# Patient Record
Sex: FEMALE | Race: WHITE | NOT HISPANIC OR LATINO | Employment: UNEMPLOYED | URBAN - NONMETROPOLITAN AREA
[De-identification: names, ages, dates, MRNs, and addresses within clinical notes are randomized per-mention and may not be internally consistent; named-entity substitution may affect disease eponyms.]

---

## 2017-01-17 ENCOUNTER — OFFICE VISIT (OUTPATIENT)
Dept: FAMILY MEDICINE CLINIC | Facility: CLINIC | Age: 22
End: 2017-01-17

## 2017-01-17 VITALS
DIASTOLIC BLOOD PRESSURE: 80 MMHG | BODY MASS INDEX: 30.13 KG/M2 | RESPIRATION RATE: 16 BRPM | OXYGEN SATURATION: 98 % | TEMPERATURE: 98.1 F | HEIGHT: 67 IN | WEIGHT: 192 LBS | SYSTOLIC BLOOD PRESSURE: 122 MMHG | HEART RATE: 82 BPM

## 2017-01-17 DIAGNOSIS — E66.09 NON MORBID OBESITY DUE TO EXCESS CALORIES: Primary | ICD-10-CM

## 2017-01-17 PROCEDURE — 99213 OFFICE O/P EST LOW 20 MIN: CPT | Performed by: FAMILY MEDICINE

## 2017-01-17 RX ORDER — NORGESTIMATE AND ETHINYL ESTRADIOL 7DAYSX3 28
1 KIT ORAL DAILY
COMMUNITY
End: 2017-09-13

## 2017-01-17 NOTE — ASSESSMENT & PLAN NOTE
Obesity: Federal guidelines recommend that people under the age of 65 should have a BMI of 18.5-24.9 and people age 65 and older should have a BMI of 23-30. Morbid obesity is defined as >100 lb overweight or BMI >40.  The patient BMI is outside the recommended range and we recommended/discussed today to utilize a diet/exercise program to get back into the appropriate range.  Today we encouraged roughly a 1 lb per week weight loss with initial goal of 5% weight loss. The initial step is to document everything that is consumed into a food diary.  Studies have shown that patients can lose up to 2x the weight by keeping track of foods.  We discussed BEE today and discussed reasonable goal to realize weight loss.    - Discussed if eating out for a meal, consider cutting food in half and placing into to-go container.  Individually portion any foods coming into the home based on package.    - Consider using smaller plates.    - Consider drinking 12 oz of water 30 minutes before meal as way to suppress appetite.   - Cut back on soft drinks/juices.  Discussed 1 can of regular soft drink has roughly 150-170 Calories per day.    - Increase exercise as able. It is recommended to try to exercise minimum 10 minutes 5 days per week.  The goal over the next 2-4 weeks is to walk 30 minutes per day 5 days per week at pace difficult to hold conversation.   - Medications that may provide some benefit to weight loss include metformin, topamax, phentermine, Qsymia, Belviq (lorcaserin), Contrave (Buproprion/naltrexone) and a few others for Binge eating.  Also discussed some of the FAD diets and recommended general portion reduction instead of special dietary changes.  - Apps that may be of benefit include [employer] Pal, Lose it.

## 2017-01-17 NOTE — PROGRESS NOTES
Subjective   Vidhi Peña is a 21 y.o. female. Would like to talk about weight loss. Wants to try Contrave    History of Present Illness     {Common H&P Review Areas:03802}    Review of Systems    Objective   Physical Exam    Assessment/Plan   {Assess/PlanSmartLinks:41458}      [id number]

## 2017-01-17 NOTE — PROGRESS NOTES
No chief complaint on file.       History:  Vidhi Peña is a 21 y.o. female presents who today for evaluation of the above problems.  PCP currently listed as Bronson Segal MD.   BEE 1689.5 Calories/day.  1858.45 Santos/day is the nutritional goal. Binge eating is present per her report.  She feels she does this 3-4x per week.  She will eat and then feel guilty, feel she needs to work out and then doesn't.  Discussed phentermine today, discussed contrave as well. She is just at BMi of 30.  She has no HTN.  Goal weight ~160.  She has gained 8 lb in past few months.  This is hard for her as she is uncertain how to lose weight on her own.  Recommended food apps, calorie counting and working on controlling total intake of food.  No Gi issues, no  issues.  Overeats regularly.       Vidhi Peña  has no past medical history on file.    Allergies   Allergen Reactions   • Hydrocodone Nausea And Vomiting   • Dilaudid [Hydromorphone] Irritability   • Adhesive Tape Hives     No past medical history on file.  Past Surgical History   Procedure Laterality Date   • Shoulder surgery Right    • Dilatation and curettage     • [place] tooth extraction       Family History   Problem Relation Age of Onset   • Hypertension Maternal Grandfather    • Hyperlipidemia Maternal Grandfather    • Heart disease Maternal Grandfather    • Cancer Paternal Grandmother        Current Outpatient Prescriptions on File Prior to Visit   Medication Sig Dispense Refill   • amoxicillin (AMOXIL) 875 MG tablet Take 1 tablet by mouth 2 (Two) Times a Day. 20 tablet 0   • hydrOXYzine (ATARAX) 25 MG tablet Take 1 tablet by mouth 3 (Three) Times a Day As Needed for anxiety. 90 tablet 0   • sertraline (ZOLOFT) 25 MG tablet Take 1 pill nightly x 7 nights then increase to 2 pills nightly (Patient taking differently: 50 mg At Night As Needed. Take 1 pill nightly x 7 nights then increase to 2 pills nightly) 60 tablet 0   • [DISCONTINUED]  "norethindrone-ethinyl estradiol (LOESTRIN FE 1/20) 1-20 MG-MCG per tablet Take 1 tablet by mouth daily       No current facility-administered medications on file prior to visit.         ROS:  Review of Systems   Constitutional: Negative for activity change, appetite change, chills, fatigue and fever.   HENT: Negative for congestion, dental problem, ear discharge, ear pain, hearing loss, postnasal drip, rhinorrhea, sneezing, sore throat and tinnitus.    Eyes: Negative for photophobia, pain, discharge, redness, itching and visual disturbance.   Respiratory: Negative for apnea, chest tightness, shortness of breath, wheezing and stridor.    Cardiovascular: Negative for chest pain.   Gastrointestinal: Negative for abdominal pain, blood in stool, diarrhea, nausea and vomiting.   Endocrine: Negative for cold intolerance, heat intolerance, polydipsia, polyphagia and polyuria.   Genitourinary: Negative for difficulty urinating, dysuria, hematuria, urgency, vaginal bleeding and vaginal discharge.   Musculoskeletal: Negative for arthralgias, back pain, gait problem, myalgias and neck pain.   Skin: Negative for color change and rash.   Allergic/Immunologic: Negative for environmental allergies and food allergies.   Neurological: Negative for dizziness, seizures, facial asymmetry, numbness and headaches.   Hematological: Negative for adenopathy.   Psychiatric/Behavioral: Negative for agitation, behavioral problems, confusion, self-injury, sleep disturbance and suicidal ideas.       OBJECTIVE:  Visit Vitals   • /80   • Pulse 82   • Temp 98.1 °F (36.7 °C)   • Resp 16   • Ht 67\" (170.2 cm)   • Wt 192 lb (87.1 kg)   • LMP  (LMP Unknown)   • SpO2 98%   • BMI 30.07 kg/m2      Physical Exam   Constitutional: She appears well-developed and well-nourished. No distress.   HENT:   Head: Normocephalic and atraumatic.   Right Ear: External ear normal.   Left Ear: External ear normal.   Nose: Nose normal.   Mouth/Throat: Oropharynx is " clear and moist.   Eyes: Conjunctivae and EOM are normal. Pupils are equal, round, and reactive to light.   Neck: Normal range of motion. Neck supple. No thyromegaly present.   Cardiovascular: Normal rate, regular rhythm, normal heart sounds and intact distal pulses.    Pulmonary/Chest: Effort normal and breath sounds normal. No respiratory distress. She has no rales. She exhibits no tenderness.   Abdominal: Soft. Bowel sounds are normal. There is no tenderness. There is no rebound and no guarding.   Musculoskeletal: Normal range of motion. She exhibits no tenderness.   Lymphadenopathy:     She has no cervical adenopathy.   Neurological: She is alert. She has normal strength and normal reflexes. No sensory deficit. Coordination normal.   Skin: Skin is warm and dry. No rash noted. She is not diaphoretic.   Psychiatric: She has a normal mood and affect. Her behavior is normal. Judgment and thought content normal.   Nursing note and vitals reviewed.      Assessment/Plan:  Obesity: Federal guidelines recommend that people under the age of 65 should have a BMI of 18.5-24.9 and people age 65 and older should have a BMI of 23-30. Morbid obesity is defined as >100 lb overweight or BMI >40.  The patient BMI is outside the recommended range and we recommended/discussed today to utilize a diet/exercise program to get back into the appropriate range.  Today we encouraged roughly a 1 lb per week weight loss with initial goal of 5% weight loss. The initial step is to document everything that is consumed into a food diary.  Studies have shown that patients can lose up to 2x the weight by keeping track of foods.  We discussed BEE today and discussed reasonable goal to realize weight loss.  Weight loss counselling.  We spent a total of 15 minutes today spent in counselling/coordination of care. Reviewed sample medication R/B/A. Detailed ways to succeed.  Provided information about success methods. Handout provided.  Detailed  phentermine and contrave.  She chose contrave.   - Discussed if eating out for a meal, consider cutting food in half and placing into to-go container.  Individually portion any foods coming into the home based on package.    - Consider using smaller plates.    - Consider drinking 12 oz of water 30 minutes before meal as way to suppress appetite.   - Cut back on soft drinks/juices.  Discussed 1 can of regular soft drink has roughly 150-170 Calories per day.    - Increase exercise as able. It is recommended to try to exercise minimum 10 minutes 5 days per week.  The goal over the next 2-4 weeks is to walk 30 minutes per day 5 days per week at pace difficult to hold conversation.   - Medications that may provide some benefit to weight loss include metformin, topamax, phentermine, Qsymia, Belviq (lorcaserin), Contrave (Buproprion/naltrexone) and a few others for Binge eating.  Also discussed some of the FAD diets and recommended general portion reduction instead of special dietary changes.  - Apps that may be of benefit include [employer] Pal, Lose it.   - she chose contrave over phentermine. R/B/A to meds d/w patient, SE reviewed, handout offered regarding medications listed.    Discussed SE with her.     Orders Placed Today:  Diagnoses and all orders for this visit:    Non morbid obesity due to excess calories    Other orders  -     naltrexone-bupropion ER (CONTRAVE) 8-90 MG tablet; wk 1:  1 tablet daily  Wk 2: 1 tablet twice a day  Wk 3: 2 tablets in AM and 1 tablet in PM  Wk 4: 2 tablets twice a day      Risks/benefits of current and new medications discussed with the patient and or family today.  The patient/family are aware and accept that if there any side effects they should call or return to clinic as soon as possible.  Appropriate F/U discussed for topics addressed today. All questions were answered to the satisfactory state of patient/family.  Should symptoms fail to improve or worsen they agree to call or  return to clinic or to go to the ER. Education handouts were offered on any new Rx if requested.  Discussed the importance of following up with any needed screening tests/labs/specialist appointments and any requested follow-up recommended by me today.  Importance of maintaining follow-up discussed and patient accepts that missed appointments can delay diagnosis and potentially lead to worsening of conditions.    An After Visit Summary was printed and given to the patient at discharge.  Return in about 2 months (around 3/17/2017).         Bronson Segal M.D. 1/17/2017

## 2017-01-17 NOTE — PATIENT INSTRUCTIONS
Bupropion tablets (Depression/Mood Disorders)  What is this medicine?  BUPROPION (byoo PROE pee on) is used to treat depression.  This medicine may be used for other purposes; ask your health care provider or pharmacist if you have questions.  What should I tell my health care provider before I take this medicine?  They need to know if you have any of these conditions:  -an eating disorder, such as anorexia or bulimia  -bipolar disorder or psychosis  -diabetes or high blood sugar, treated with medication  -glaucoma  -heart disease, previous heart attack, or irregular heart beat  -head injury or brain tumor  -high blood pressure  -kidney or liver disease  -seizures  -suicidal thoughts or a previous suicide attempt  -Tourette's syndrome  -weight loss  -an unusual or allergic reaction to bupropion, other medicines, foods, dyes, or preservatives  -breast-feeding  -pregnant or trying to become pregnant  How should I use this medicine?  Take this medicine by mouth with a glass of water. Follow the directions on the prescription label. You can take it with or without food. If it upsets your stomach, take it with food. Take your medicine at regular intervals. Do not take your medicine more often than directed. Do not stop taking this medicine suddenly except upon the advice of your doctor. Stopping this medicine too quickly may cause serious side effects or your condition may worsen.  A special MedGuide will be given to you by the pharmacist with each prescription and refill. Be sure to read this information carefully each time.  Talk to your pediatrician regarding the use of this medicine in children. Special care may be needed.  Overdosage: If you think you have taken too much of this medicine contact a poison control center or emergency room at once.  NOTE: This medicine is only for you. Do not share this medicine with others.  What if I miss a dose?  If you miss a dose, take it as soon as you can. If it is less than  four hours to your next dose, take only that dose and skip the missed dose. Do not take double or extra doses.  What may interact with this medicine?  Do not take this medicine with any of the following medications:  -linezolid  -MAOIs like Azilect, Carbex, Eldepryl, Marplan, Nardil, and Parnate  -methylene blue (injected into a vein)  -other medicines that contain bupropion like Zyban  This medicine may also interact with the following medications:  -alcohol  -certain medicines for anxiety or sleep  -certain medicines for blood pressure like metoprolol, propranolol  -certain medicines for depression or psychotic disturbances  -certain medicines for HIV or AIDS like efavirenz, lopinavir, nelfinavir, ritonavir  -certain medicines for irregular heart beat like propafenone, flecainide  -certain medicines for Parkinson's disease like amantadine, levodopa  -certain medicines for seizures like carbamazepine, phenytoin, phenobarbital  -cimetidine  -clopidogrel  -cyclophosphamide  -furazolidone  -isoniazid  -nicotine  -orphenadrine  -procarbazine  -steroid medicines like prednisone or cortisone  -stimulant medicines for attention disorders, weight loss, or to stay awake  -tamoxifen  -theophylline  -thiotepa  -ticlopidine  -tramadol  -warfarin  This list may not describe all possible interactions. Give your health care provider a list of all the medicines, herbs, non-prescription drugs, or dietary supplements you use. Also tell them if you smoke, drink alcohol, or use illegal drugs. Some items may interact with your medicine.  What should I watch for while using this medicine?  Tell your doctor if your symptoms do not get better or if they get worse. Visit your doctor or health care professional for regular checks on your progress. Because it may take several weeks to see the full effects of this medicine, it is important to continue your treatment as prescribed by your doctor.  Patients and their families should watch out  for new or worsening thoughts of suicide or depression. Also watch out for sudden changes in feelings such as feeling anxious, agitated, panicky, irritable, hostile, aggressive, impulsive, severely restless, overly excited and hyperactive, or not being able to sleep. If this happens, especially at the beginning of treatment or after a change in dose, call your health care professional.  Avoid alcoholic drinks while taking this medicine. Drinking excessive alcoholic beverages, using sleeping or anxiety medicines, or quickly stopping the use of these agents while taking this medicine may increase your risk for a seizure.  Do not drive or use heavy machinery until you know how this medicine affects you. This medicine can impair your ability to perform these tasks.  Do not take this medicine close to bedtime. It may prevent you from sleeping.  Your mouth may get dry. Chewing sugarless gum or sucking hard candy, and drinking plenty of water may help. Contact your doctor if the problem does not go away or is severe.  What side effects may I notice from receiving this medicine?  Side effects that you should report to your doctor or health care professional as soon as possible:  -allergic reactions like skin rash, itching or hives, swelling of the face, lips, or tongue  -breathing problems  -changes in vision  -confusion  -fast or irregular heartbeat  -hallucinations  -increased blood pressure  -redness, blistering, peeling or loosening of the skin, including inside the mouth  -seizures  -suicidal thoughts or other mood changes  -unusually weak or tired  -vomiting  Side effects that usually do not require medical attention (report to your doctor or health care professional if they continue or are bothersome):  -change in sex drive or performance  -constipation  -headache  -loss of appetite  -nausea  -tremors  -weight loss  This list may not describe all possible side effects. Call your doctor for medical advice about side  effects. You may report side effects to FDA at 1-725-BKG-8191.  Where should I keep my medicine?  Keep out of the reach of children.  Store at room temperature between 15 and 25 degrees C (59 and 77 degrees F), away from direct sunlight and moisture. Keep tightly closed. Throw away any unused medicine after the expiration date.  NOTE: This sheet is a summary. It may not cover all possible information. If you have questions about this medicine, talk to your doctor, pharmacist, or health care provider.     © 2016, [employer]. (2014-07-11 12:42:42)

## 2017-01-17 NOTE — MR AVS SNAPSHOT
Vidhi Peña   1/17/2017 11:30 AM   Office Visit    Dept Phone:  [phone]   Encounter #:  [id number]    Provider:  Bronson Segal MD   Department:  [facility]                Your Full Care Plan              Today's Medication Changes          These changes are accurate as of: 1/17/17  1:44 PM.  If you have any questions, ask your nurse or doctor.               New Medication(s)Ordered:     naltrexone-bupropion ER 8-90 MG tablet   Commonly known as:  CONTRAVE   wk 1:  1 tablet daily Wk 2: 1 tablet twice a day Wk 3: 2 tablets in AM and 1 tablet in PM Wk 4: 2 tablets twice a day   Started by:  Bronson Segal MD         Stop taking medication(s)listed here:     amoxicillin 875 MG tablet   Commonly known as:  AMOXIL   Stopped by:  Bronson Segal MD           hydrOXYzine 25 MG tablet   Commonly known as:  ATARAX   Stopped by:  Bronson Segal MD           LOESTRIN FE 1/20 1-20 MG-MCG per tablet   Generic drug:  norethindrone-ethinyl estradiol   Stopped by:  Bronson Segal MD           sertraline 25 MG tablet   Commonly known as:  ZOLOFT   Stopped by:  Bronson Segal MD                Where to Get Your Medications      Information about where to get these medications is not yet available     ! Ask your nurse or doctor about these medications     naltrexone-bupropion ER 8-90 MG tablet                  Your Updated Medication List          This list is accurate as of: 1/17/17  1:44 PM.  Always use your most recent med list.                naltrexone-bupropion ER 8-90 MG tablet   Commonly known as:  CONTRAVE   wk 1:  1 tablet daily Wk 2: 1 tablet twice a day Wk 3: 2 tablets in AM and 1 tablet in PM Wk 4: 2 tablets twice a day       ORTHO TRI-CYCLEN (28) 0.18/0.215/0.25 MG-35 MCG per tablet   Generic drug:  norgestimate-ethinyl estradiol               You Were Diagnosed With        Codes Comments    Non morbid obesity due to excess  calories    -  Primary ICD-10-CM: E66.09  ICD-9-CM: 278.00       Instructions    Bupropion tablets (Depression/Mood Disorders)  What is this medicine?  BUPROPION (byoo PROGABRIEL pee on) is used to treat depression.  This medicine may be used for other purposes; ask your health care provider or pharmacist if you have questions.  What should I tell my health care provider before I take this medicine?  They need to know if you have any of these conditions:  -an eating disorder, such as anorexia or bulimia  -bipolar disorder or psychosis  -diabetes or high blood sugar, treated with medication  -glaucoma  -heart disease, previous heart attack, or irregular heart beat  -head injury or brain tumor  -high blood pressure  -kidney or liver disease  -seizures  -suicidal thoughts or a previous suicide attempt  -Tourette's syndrome  -weight loss  -an unusual or allergic reaction to bupropion, other medicines, foods, dyes, or preservatives  -breast-feeding  -pregnant or trying to become pregnant  How should I use this medicine?  Take this medicine by mouth with a glass of water. Follow the directions on the prescription label. You can take it with or without food. If it upsets your stomach, take it with food. Take your medicine at regular intervals. Do not take your medicine more often than directed. Do not stop taking this medicine suddenly except upon the advice of your doctor. Stopping this medicine too quickly may cause serious side effects or your condition may worsen.  A special MedGuide will be given to you by the pharmacist with each prescription and refill. Be sure to read this information carefully each time.  Talk to your pediatrician regarding the use of this medicine in children. Special care may be needed.  Overdosage: If you think you have taken too much of this medicine contact a poison control center or emergency room at once.  NOTE: This medicine is only for you. Do not share this medicine with others.  What if I  miss a dose?  If you miss a dose, take it as soon as you can. If it is less than four hours to your next dose, take only that dose and skip the missed dose. Do not take double or extra doses.  What may interact with this medicine?  Do not take this medicine with any of the following medications:  -linezolid  -MAOIs like Azilect, Carbex, Eldepryl, Marplan, Nardil, and Parnate  -methylene blue (injected into a vein)  -other medicines that contain bupropion like Zyban  This medicine may also interact with the following medications:  -alcohol  -certain medicines for anxiety or sleep  -certain medicines for blood pressure like metoprolol, propranolol  -certain medicines for depression or psychotic disturbances  -certain medicines for HIV or AIDS like efavirenz, lopinavir, nelfinavir, ritonavir  -certain medicines for irregular heart beat like propafenone, flecainide  -certain medicines for Parkinson's disease like amantadine, levodopa  -certain medicines for seizures like carbamazepine, phenytoin, phenobarbital  -cimetidine  -clopidogrel  -cyclophosphamide  -furazolidone  -isoniazid  -nicotine  -orphenadrine  -procarbazine  -steroid medicines like prednisone or cortisone  -stimulant medicines for attention disorders, weight loss, or to stay awake  -tamoxifen  -theophylline  -thiotepa  -ticlopidine  -tramadol  -warfarin  This list may not describe all possible interactions. Give your health care provider a list of all the medicines, herbs, non-prescription drugs, or dietary supplements you use. Also tell them if you smoke, drink alcohol, or use illegal drugs. Some items may interact with your medicine.  What should I watch for while using this medicine?  Tell your doctor if your symptoms do not get better or if they get worse. Visit your doctor or health care professional for regular checks on your progress. Because it may take several weeks to see the full effects of this medicine, it is important to continue your  treatment as prescribed by your doctor.  Patients and their families should watch out for new or worsening thoughts of suicide or depression. Also watch out for sudden changes in feelings such as feeling anxious, agitated, panicky, irritable, hostile, aggressive, impulsive, severely restless, overly excited and hyperactive, or not being able to sleep. If this happens, especially at the beginning of treatment or after a change in dose, call your health care professional.  Avoid alcoholic drinks while taking this medicine. Drinking excessive alcoholic beverages, using sleeping or anxiety medicines, or quickly stopping the use of these agents while taking this medicine may increase your risk for a seizure.  Do not drive or use heavy machinery until you know how this medicine affects you. This medicine can impair your ability to perform these tasks.  Do not take this medicine close to bedtime. It may prevent you from sleeping.  Your mouth may get dry. Chewing sugarless gum or sucking hard candy, and drinking plenty of water may help. Contact your doctor if the problem does not go away or is severe.  What side effects may I notice from receiving this medicine?  Side effects that you should report to your doctor or health care professional as soon as possible:  -allergic reactions like skin rash, itching or hives, swelling of the face, lips, or tongue  -breathing problems  -changes in vision  -confusion  -fast or irregular heartbeat  -hallucinations  -increased blood pressure  -redness, blistering, peeling or loosening of the skin, including inside the mouth  -seizures  -suicidal thoughts or other mood changes  -unusually weak or tired  -vomiting  Side effects that usually do not require medical attention (report to your doctor or health care professional if they continue or are bothersome):  -change in sex drive or performance  -constipation  -headache  -loss of appetite  -nausea  -tremors  -weight loss  This list may  "not describe all possible side effects. Call your doctor for medical advice about side effects. You may report side effects to FDA at 1-268-RFS-9543.  Where should I keep my medicine?  Keep out of the reach of children.  Store at room temperature between 15 and 25 degrees C (59 and 77 degrees F), away from direct sunlight and moisture. Keep tightly closed. Throw away any unused medicine after the expiration date.  NOTE: This sheet is a summary. It may not cover all possible information. If you have questions about this medicine, talk to your doctor, pharmacist, or health care provider.     © 2016, [employer]. (2014-07-11 12:42:42)       Patient Instructions History      Upcoming Appointments     Visit Type Date Time Department    OFFICE VISIT 1/17/2017 11:30 AM SERGIO MCKENZIE Malin      [facility] Signup     Our records indicate that you have an active [employer] account.    You can view your After Visit Summary by going to MainOne and logging in with your [employer] username and password.  If you don't have a [employer] username and password but a parent or guardian has access to your record, the parent or guardian should login with their own [employer] username and password and access your record to view the After Visit Summary.    If you have questions, you can email [email or web address] or call [phone] to talk to our [employer] staff.  Remember, [employer] is NOT to be used for urgent needs.  For medical emergencies, dial 911.               Other Info from Your Visit           Allergies     Hydrocodone Allergy Nausea And Vomiting    Dilaudid [Hydromorphone] Allergy Irritability    Adhesive Tape  Hives      Vital Signs     Blood Pressure Pulse Temperature Respirations Height Weight    122/80 82 98.1 °F (36.7 °C) 16 67\" (170.2 cm) 192 lb (87.1 kg)    Last Menstrual Period Oxygen Saturation Body Mass Index Smoking Status          (LMP Unknown) 98% 30.07 kg/m2 Never Smoker      "   Problems and Diagnoses Noted     Non morbid obesity due to excess calories

## 2017-01-19 VITALS
RESPIRATION RATE: 16 BRPM | HEART RATE: 70 BPM | OXYGEN SATURATION: 98 % | SYSTOLIC BLOOD PRESSURE: 124 MMHG | BODY MASS INDEX: 29.19 KG/M2 | WEIGHT: 186 LBS | TEMPERATURE: 97.7 F | DIASTOLIC BLOOD PRESSURE: 70 MMHG | HEIGHT: 67 IN

## 2017-03-03 ENCOUNTER — HOSPITAL ENCOUNTER (OUTPATIENT)
Dept: ULTRASOUND IMAGING | Facility: HOSPITAL | Age: 22
Discharge: HOME | End: 2017-03-03
Admitting: NURSE PRACTITIONER

## 2017-03-03 ENCOUNTER — TRANSCRIBE ORDERS (OUTPATIENT)
Dept: ADMINISTRATIVE | Facility: HOSPITAL | Age: 22
End: 2017-03-03

## 2017-03-03 DIAGNOSIS — O36.4XX0: ICD-10-CM

## 2017-03-03 DIAGNOSIS — O36.4XX0: Primary | ICD-10-CM

## 2017-03-03 PROCEDURE — 76817 TRANSVAGINAL US OBSTETRIC: CPT

## 2017-03-06 ENCOUNTER — TRANSCRIBE ORDERS (OUTPATIENT)
Dept: ADMINISTRATIVE | Facility: HOSPITAL | Age: 22
End: 2017-03-06

## 2017-03-06 DIAGNOSIS — O20.9 BLEEDING IN EARLY PREGNANCY: Primary | ICD-10-CM

## 2017-03-08 ENCOUNTER — HOSPITAL ENCOUNTER (EMERGENCY)
Facility: HOSPITAL | Age: 22
Discharge: HOME | End: 2017-03-08
Admitting: EMERGENCY MEDICINE

## 2017-03-08 ENCOUNTER — APPOINTMENT (OUTPATIENT)
Dept: ULTRASOUND IMAGING | Facility: HOSPITAL | Age: 22
End: 2017-03-08

## 2017-03-08 VITALS
RESPIRATION RATE: 16 BRPM | HEART RATE: 78 BPM | HEIGHT: 67 IN | DIASTOLIC BLOOD PRESSURE: 66 MMHG | WEIGHT: 186.8 LBS | BODY MASS INDEX: 29.32 KG/M2 | TEMPERATURE: 98.3 F | OXYGEN SATURATION: 100 % | SYSTOLIC BLOOD PRESSURE: 126 MMHG

## 2017-03-08 DIAGNOSIS — N39.0 ACUTE UTI: ICD-10-CM

## 2017-03-08 DIAGNOSIS — N83.202 CYSTS OF BOTH OVARIES: ICD-10-CM

## 2017-03-08 DIAGNOSIS — O03.9 MISCARRIAGE: Primary | ICD-10-CM

## 2017-03-08 DIAGNOSIS — N83.201 CYSTS OF BOTH OVARIES: ICD-10-CM

## 2017-03-08 LAB
ABO GROUP BLD: NORMAL
ALBUMIN SERPL-MCNC: 4.7 G/DL (ref 3.5–5)
ALBUMIN/GLOB SERPL: 1.5 G/DL (ref 1.1–2.5)
ALP SERPL-CCNC: 81 U/L (ref 24–120)
ALT SERPL W P-5'-P-CCNC: 35 U/L (ref 0–54)
ANION GAP SERPL CALCULATED.3IONS-SCNC: 13 MMOL/L (ref 4–13)
AST SERPL-CCNC: 32 U/L (ref 7–45)
BACTERIA UR QL AUTO: ABNORMAL /HPF
BASOPHILS # BLD AUTO: 0.03 10*3/MM3 (ref 0–0.2)
BASOPHILS NFR BLD AUTO: 0.3 % (ref 0–2)
BILIRUB SERPL-MCNC: 0.4 MG/DL (ref 0.1–1)
BILIRUB UR QL STRIP: NEGATIVE
BUN BLD-MCNC: 9 MG/DL (ref 5–21)
BUN/CREAT SERPL: 11.7 (ref 7–25)
CALCIUM SPEC-SCNC: 9.9 MG/DL (ref 8.4–10.4)
CHLORIDE SERPL-SCNC: 100 MMOL/L (ref 98–110)
CLARITY UR: ABNORMAL
CO2 SERPL-SCNC: 28 MMOL/L (ref 24–31)
COLOR UR: ABNORMAL
CREAT BLD-MCNC: 0.77 MG/DL (ref 0.5–1.4)
DEPRECATED RDW RBC AUTO: 38.7 FL (ref 40–54)
EOSINOPHIL # BLD AUTO: 0.11 10*3/MM3 (ref 0–0.7)
EOSINOPHIL NFR BLD AUTO: 1 % (ref 0–4)
ERYTHROCYTE [DISTWIDTH] IN BLOOD BY AUTOMATED COUNT: 13 % (ref 12–15)
GFR SERPL CREATININE-BSD FRML MDRD: 93 ML/MIN/1.73
GLOBULIN UR ELPH-MCNC: 3.1 GM/DL
GLUCOSE BLD-MCNC: 101 MG/DL (ref 70–100)
GLUCOSE UR STRIP-MCNC: NEGATIVE MG/DL
HCG INTACT+B SERPL-ACNC: 202.32 MIU/ML (ref 0–10)
HCT VFR BLD AUTO: 37.9 % (ref 37–47)
HGB BLD-MCNC: 13.4 G/DL (ref 12–16)
HGB UR QL STRIP.AUTO: ABNORMAL
HOLD SPECIMEN: NORMAL
HOLD SPECIMEN: NORMAL
HYALINE CASTS UR QL AUTO: ABNORMAL /LPF
IMM GRANULOCYTES # BLD: 0.03 10*3/MM3 (ref 0–0.03)
IMM GRANULOCYTES NFR BLD: 0.3 % (ref 0–5)
KETONES UR QL STRIP: NEGATIVE
LEUKOCYTE ESTERASE UR QL STRIP.AUTO: ABNORMAL
LYMPHOCYTES # BLD AUTO: 4.28 10*3/MM3 (ref 0.72–4.86)
LYMPHOCYTES NFR BLD AUTO: 39.7 % (ref 15–45)
MCH RBC QN AUTO: 28.8 PG (ref 28–32)
MCHC RBC AUTO-ENTMCNC: 35.4 G/DL (ref 33–36)
MCV RBC AUTO: 81.5 FL (ref 82–98)
MONOCYTES # BLD AUTO: 0.79 10*3/MM3 (ref 0.19–1.3)
MONOCYTES NFR BLD AUTO: 7.3 % (ref 4–12)
NEUTROPHILS # BLD AUTO: 5.53 10*3/MM3 (ref 1.87–8.4)
NEUTROPHILS NFR BLD AUTO: 51.4 % (ref 39–78)
NITRITE UR QL STRIP: NEGATIVE
PH UR STRIP.AUTO: 7.5 [PH] (ref 5–8)
PLATELET # BLD AUTO: 236 10*3/MM3 (ref 130–400)
PMV BLD AUTO: 9.9 FL (ref 6–12)
POTASSIUM BLD-SCNC: 3.6 MMOL/L (ref 3.5–5.3)
PROT SERPL-MCNC: 7.8 G/DL (ref 6.3–8.7)
PROT UR QL STRIP: ABNORMAL
RBC # BLD AUTO: 4.65 10*6/MM3 (ref 4.2–5.4)
RBC # UR: ABNORMAL /HPF
REF LAB TEST METHOD: ABNORMAL
RH BLD: POSITIVE
SODIUM BLD-SCNC: 141 MMOL/L (ref 135–145)
SP GR UR STRIP: 1.02 (ref 1–1.03)
SQUAMOUS #/AREA URNS HPF: ABNORMAL /HPF
UROBILINOGEN UR QL STRIP: ABNORMAL
WBC NRBC COR # BLD: 10.77 10*3/MM3 (ref 4.8–10.8)
WBC UR QL AUTO: ABNORMAL /HPF
WHOLE BLOOD HOLD SPECIMEN: NORMAL
WHOLE BLOOD HOLD SPECIMEN: NORMAL

## 2017-03-08 PROCEDURE — 86900 BLOOD TYPING SEROLOGIC ABO: CPT | Performed by: NURSE PRACTITIONER

## 2017-03-08 PROCEDURE — 96361 HYDRATE IV INFUSION ADD-ON: CPT

## 2017-03-08 PROCEDURE — 76817 TRANSVAGINAL US OBSTETRIC: CPT

## 2017-03-08 PROCEDURE — 96376 TX/PRO/DX INJ SAME DRUG ADON: CPT

## 2017-03-08 PROCEDURE — 25010000002 CEFTRIAXONE: Performed by: NURSE PRACTITIONER

## 2017-03-08 PROCEDURE — 96375 TX/PRO/DX INJ NEW DRUG ADDON: CPT

## 2017-03-08 PROCEDURE — 80053 COMPREHEN METABOLIC PANEL: CPT | Performed by: NURSE PRACTITIONER

## 2017-03-08 PROCEDURE — 25010000002 ONDANSETRON PER 1 MG: Performed by: NURSE PRACTITIONER

## 2017-03-08 PROCEDURE — 85025 COMPLETE CBC W/AUTO DIFF WBC: CPT | Performed by: NURSE PRACTITIONER

## 2017-03-08 PROCEDURE — 84702 CHORIONIC GONADOTROPIN TEST: CPT | Performed by: NURSE PRACTITIONER

## 2017-03-08 PROCEDURE — 81001 URINALYSIS AUTO W/SCOPE: CPT | Performed by: NURSE PRACTITIONER

## 2017-03-08 PROCEDURE — 99283 EMERGENCY DEPT VISIT LOW MDM: CPT

## 2017-03-08 PROCEDURE — 25010000002 MEPERIDINE PER 100 MG: Performed by: NURSE PRACTITIONER

## 2017-03-08 PROCEDURE — 96365 THER/PROPH/DIAG IV INF INIT: CPT

## 2017-03-08 PROCEDURE — 87086 URINE CULTURE/COLONY COUNT: CPT | Performed by: NURSE PRACTITIONER

## 2017-03-08 PROCEDURE — 86901 BLOOD TYPING SEROLOGIC RH(D): CPT | Performed by: NURSE PRACTITIONER

## 2017-03-08 RX ORDER — MEPERIDINE HYDROCHLORIDE 25 MG/ML
25 INJECTION INTRAMUSCULAR; INTRAVENOUS; SUBCUTANEOUS ONCE
Status: COMPLETED | OUTPATIENT
Start: 2017-03-08 | End: 2017-03-08

## 2017-03-08 RX ORDER — OXYCODONE AND ACETAMINOPHEN 7.5; 325 MG/1; MG/1
1 TABLET ORAL EVERY 4 HOURS PRN
Qty: 12 TABLET | Refills: 0 | Status: SHIPPED | OUTPATIENT
Start: 2017-03-08 | End: 2017-09-13

## 2017-03-08 RX ORDER — NITROFURANTOIN 25; 75 MG/1; MG/1
100 CAPSULE ORAL 2 TIMES DAILY
Qty: 14 CAPSULE | Refills: 0 | Status: SHIPPED | OUTPATIENT
Start: 2017-03-08 | End: 2017-03-15

## 2017-03-08 RX ORDER — ONDANSETRON 2 MG/ML
4 INJECTION INTRAMUSCULAR; INTRAVENOUS ONCE
Status: COMPLETED | OUTPATIENT
Start: 2017-03-08 | End: 2017-03-08

## 2017-03-08 RX ADMIN — MEPERIDINE HYDROCHLORIDE 25 MG: 25 INJECTION, SOLUTION INTRAMUSCULAR; INTRAVENOUS; SUBCUTANEOUS at 17:23

## 2017-03-08 RX ADMIN — MEPERIDINE HYDROCHLORIDE 25 MG: 25 INJECTION, SOLUTION INTRAMUSCULAR; INTRAVENOUS; SUBCUTANEOUS at 19:11

## 2017-03-08 RX ADMIN — CEFTRIAXONE 1 G: 1 INJECTION, POWDER, FOR SOLUTION INTRAMUSCULAR; INTRAVENOUS at 18:56

## 2017-03-08 RX ADMIN — SODIUM CHLORIDE 1000 ML: 9 INJECTION, SOLUTION INTRAVENOUS at 17:27

## 2017-03-08 RX ADMIN — ONDANSETRON 4 MG: 2 INJECTION INTRAMUSCULAR; INTRAVENOUS at 17:21

## 2017-03-08 NOTE — ED PROVIDER NOTES
Subjective   HPI Comments: Patient is a 21-year-old white female presents with vaginal bleeding for the past week.  He states she is about 5 weeks pregnant. She states that she started having lower abd pain today. She states that she spoke with dr yanez's office and was advised to come to er for further eval     Patient is a 21 y.o. female presenting with vaginal bleeding.   History provided by:  Patient   used: No    Vaginal Bleeding       Review of Systems   Constitutional: Negative.    HENT: Negative.    Eyes: Negative.    Respiratory: Negative.    Cardiovascular: Negative.    Gastrointestinal: Negative.    Endocrine: Negative.    Genitourinary: Positive for vaginal bleeding.        Pt states that she is about 5 weeks pregnant. She states that she has had vaginal bleeding for the past week. She states that today she started having lower abd pain that has worsened throughout the day. She states that she was worried because she did not start having pain until today. She states that she had ob ultrasound last week and states that the result indicated that she was 5 weeks but states that they were unable to visualize if in uterus because of excessive gas. She denies fever or chills   Musculoskeletal: Negative.    Skin: Negative.    Allergic/Immunologic: Negative.    Neurological: Negative.    Hematological: Negative.    Psychiatric/Behavioral: Negative.    All other systems reviewed and are negative.      Past Medical History   Diagnosis Date   • Miscarriage        Allergies   Allergen Reactions   • Hydrocodone Nausea And Vomiting   • Dilaudid [Hydromorphone] Irritability   • Adhesive Tape Hives       Past Surgical History   Procedure Laterality Date   • Shoulder surgery Right    • Dilatation and curettage     • [place] tooth extraction         Family History   Problem Relation Age of Onset   • Hypertension Maternal Grandfather    • Hyperlipidemia Maternal Grandfather    • Heart disease Maternal  "Grandfather    • Cancer Paternal Grandmother        Social History     Social History   • Marital status: Single     Spouse name: N/A   • Number of children: N/A   • Years of education: N/A     Social History Main Topics   • Smoking status: Never Smoker   • Smokeless tobacco: Never Used   • Alcohol use Yes      Comment: occassional   • Drug use: No   • Sexual activity: Defer     Other Topics Concern   • None     Social History Narrative   • None       Prior to Admission medications    Medication Sig Start Date End Date Taking? Authorizing Provider   naltrexone-bupropion ER (CONTRAVE) 8-90 MG tablet wk 1:  1 tablet daily  Wk 2: 1 tablet twice a day  Wk 3: 2 tablets in AM and 1 tablet in PM  Wk 4: 2 tablets twice a day 1/17/17   Bronson Segal MD   norgestimate-ethinyl estradiol (ORTHO TRI-CYCLEN, 28,) 0.18/0.215/0.25 MG-35 MCG per tablet Take 1 tablet by mouth Daily.    Historical Provider, MD       Visit Vitals   • /66   • Pulse 78   • Temp 98.3 °F (36.8 °C)   • Resp 16   • Ht 67\" (170.2 cm)   • Wt 186 lb 12.8 oz (84.7 kg)   • SpO2 100%   • BMI 29.26 kg/m2       Objective   Physical Exam   Constitutional: She is oriented to person, place, and time. Vital signs are normal. She appears well-developed and well-nourished.  Non-toxic appearance. No distress.   HENT:   Head: Normocephalic. Head is without raccoon's eyes, without Jimenez's sign, without abrasion, without contusion and without laceration.   Right Ear: Tympanic membrane and external ear normal.   Left Ear: Tympanic membrane and external ear normal.   Nose: Nose normal.   Mouth/Throat: Oropharynx is clear and moist.   Eyes: Conjunctivae, EOM and lids are normal. Pupils are equal, round, and reactive to light.   Neck: Trachea normal, normal range of motion and full passive range of motion without pain. Neck supple. No JVD present. No spinous process tenderness and no muscular tenderness present. Carotid bruit is not present. No tracheal deviation and " normal range of motion present.   Cardiovascular: Normal rate, regular rhythm, normal heart sounds, intact distal pulses and normal pulses.  PMI is not displaced.    Pulmonary/Chest: Effort normal and breath sounds normal. No accessory muscle usage or stridor. No apnea and no tachypnea. No respiratory distress. Chest wall is not dull to percussion. She exhibits no mass, no tenderness, no bony tenderness, no laceration, no crepitus, no deformity and no swelling.   Abdominal: Soft. Normal aorta and bowel sounds are normal. There is no hepatosplenomegaly. There is no tenderness. There is no CVA tenderness.   Lower abd pain on palp. No guarding or rebound noted.    Musculoskeletal: Normal range of motion.        Cervical back: Normal. She exhibits normal range of motion, no tenderness, no bony tenderness, no spasm and normal pulse.        Thoracic back: Normal. She exhibits normal range of motion, no tenderness, no bony tenderness, no spasm and normal pulse.        Lumbar back: She exhibits normal range of motion, no tenderness, no bony tenderness, no deformity, no laceration, no pain, no spasm and normal pulse.   Neurological: She is alert and oriented to person, place, and time. She has normal strength and normal reflexes. No cranial nerve deficit or sensory deficit. GCS eye subscore is 4. GCS verbal subscore is 5. GCS motor subscore is 6.   Reflex Scores:       Tricep reflexes are 2+ on the right side and 2+ on the left side.       Bicep reflexes are 2+ on the right side and 2+ on the left side.       Patellar reflexes are 2+ on the right side and 2+ on the left side.       Achilles reflexes are 2+ on the right side and 2+ on the left side.  Skin: Skin is warm, dry and intact. No abrasion, no ecchymosis and no laceration noted.   Psychiatric: She has a normal mood and affect. Her speech is normal and behavior is normal.   Nursing note and vitals reviewed.      Procedures         Lab Results (last 24 hours)      Procedure Component Value Units Date/Time    CBC & Differential [[id number]] Collected:  03/08/17 1643    Specimen:  Blood Updated:  03/08/17 1705    Narrative:       The following orders were created for panel order CBC & Differential.  Procedure                               Abnormality         Status                     ---------                               -----------         ------                     CBC Auto Differential[[id number]]         Abnormal            Final result                 Please view results for these tests on the individual orders.    Comprehensive Metabolic Panel [[id number]]  (Abnormal) Collected:  03/08/17 1643    Specimen:  Blood Updated:  03/08/17 1719     Glucose 101 (H) mg/dL      BUN 9 mg/dL      Creatinine 0.77 mg/dL      Sodium 141 mmol/L      Potassium 3.6 mmol/L      Chloride 100 mmol/L      CO2 28.0 mmol/L      Calcium 9.9 mg/dL      Total Protein 7.8 g/dL      Albumin 4.70 g/dL      ALT (SGPT) 35 U/L      AST (SGOT) 32 U/L      Alkaline Phosphatase 81 U/L      Total Bilirubin 0.4 mg/dL      eGFR Non African Amer 93 mL/min/1.73      Globulin 3.1 gm/dL      A/G Ratio 1.5 g/dL      BUN/Creatinine Ratio 11.7      Anion Gap 13.0 mmol/L     hCG, Quantitative, Pregnancy [[id number]]  (Abnormal) Collected:  03/08/17 1643    Specimen:  Blood Updated:  03/08/17 1735     HCG Quantitative 202.32 (H) mIU/mL     CBC Auto Differential [[id number]]  (Abnormal) Collected:  03/08/17 1643    Specimen:  Blood Updated:  03/08/17 1705     WBC 10.77 10*3/mm3      RBC 4.65 10*6/mm3      Hemoglobin 13.4 g/dL      Hematocrit 37.9 %      MCV 81.5 (L) fL      MCH 28.8 pg      MCHC 35.4 g/dL      RDW 13.0 %      RDW-SD 38.7 (L) fl      MPV 9.9 fL      Platelets 236 10*3/mm3      Neutrophil % 51.4 %      Lymphocyte % 39.7 %      Monocyte % 7.3 %      Eosinophil % 1.0 %      Basophil % 0.3 %      Immature Grans % 0.3 %      Neutrophils, Absolute 5.53 10*3/mm3      Lymphocytes, Absolute 4.28 10*3/mm3      Monocytes,  Absolute 0.79 10*3/mm3      Eosinophils, Absolute 0.11 10*3/mm3      Basophils, Absolute 0.03 10*3/mm3      Immature Grans, Absolute 0.03 10*3/mm3     Urinalysis With / Culture If Indicated [[id number]]  (Abnormal) Collected:  03/08/17 1733    Specimen:  Urine from Urine, Clean Catch Updated:  03/08/17 1758     Color, UA Orange (A)      Appearance, UA Turbid (A)      pH, UA 7.5      Specific Gravity, UA 1.021      Glucose, UA Negative      Ketones, UA Negative      Bilirubin, UA Negative      Blood, UA Large (3+) (A)      Protein, UA 30 mg/dL (1+) (A)      Leuk Esterase, UA Trace (A)      Nitrite, UA Negative      Urobilinogen, UA 0.2 E.U./dL     Narrative:       Dipstick results may be inaccurate due to color interference.    Urinalysis, Microscopic Only [[id number]]  (Abnormal) Collected:  03/08/17 1733    Specimen:  Urine from Urine, Clean Catch Updated:  03/08/17 1758     RBC, UA 13-20 (A) /HPF      WBC, UA 3-5 (A) /HPF      Bacteria, UA 2+ (A) /HPF      Squamous Epithelial Cells, UA 3-6 (A) /HPF      Hyaline Casts, UA 0-2 /LPF      Methodology Automated Microscopy     Urine Culture [[id number]]  (Normal) Collected:  03/08/17 1733    Specimen:  Urine from Urine, Clean Catch Updated:  03/09/17 0640     Urine Culture No growth at 24 hours           US Ob Transvaginal   Final Result   Negative pelvic ultrasound.           This report was finalized on 03/08/2017 20:17 by Dr. Robert Richards MD.          ED Course  ED Course   Comment By Time   Reeval pt- states that she is feeling better at this time. Advised pt and pt family of results and need for follow up with dr yanez. Ricky report completed #[id number]. No signs of suspicious activity noted. Will write for small amt of pain medication for acute pain. Reviewed side effects and potential for abuse. Advised to return if symptoms worsen  Emilie Birmingham, APRN 03/08 1934          MDM  Number of Diagnoses or Management Options  Acute UTI: minor  Cysts of both ovaries:  new and requires workup  Miscarriage: established and worsening     Amount and/or Complexity of Data Reviewed  Clinical lab tests: ordered and reviewed  Tests in the radiology section of CPT®: ordered and reviewed    Patient Progress  Patient progress: stable      Final diagnoses:   Miscarriage   Acute UTI   Cysts of both ovaries          Emilie Birmingham, APRN  03/09/17 [id number]

## 2017-03-10 LAB — BACTERIA SPEC AEROBE CULT: ABNORMAL

## 2017-03-13 ENCOUNTER — HOSPITAL ENCOUNTER (OUTPATIENT)
Dept: ULTRASOUND IMAGING | Facility: HOSPITAL | Age: 22
End: 2017-03-13

## 2017-04-04 ENCOUNTER — OFFICE VISIT (OUTPATIENT)
Dept: FAMILY MEDICINE CLINIC | Facility: CLINIC | Age: 22
End: 2017-04-04

## 2017-04-04 VITALS
TEMPERATURE: 97.9 F | DIASTOLIC BLOOD PRESSURE: 72 MMHG | HEIGHT: 67 IN | WEIGHT: 185 LBS | SYSTOLIC BLOOD PRESSURE: 116 MMHG | BODY MASS INDEX: 29.03 KG/M2 | OXYGEN SATURATION: 98 % | HEART RATE: 75 BPM

## 2017-04-04 DIAGNOSIS — J01.00 ACUTE NON-RECURRENT MAXILLARY SINUSITIS: ICD-10-CM

## 2017-04-04 DIAGNOSIS — J02.8 PHARYNGITIS DUE TO OTHER ORGANISM: Primary | ICD-10-CM

## 2017-04-04 LAB
EXPIRATION DATE: NORMAL
INTERNAL CONTROL: NORMAL
Lab: NORMAL
S PYO AG THROAT QL: NEGATIVE

## 2017-04-04 PROCEDURE — 87880 STREP A ASSAY W/OPTIC: CPT | Performed by: NURSE PRACTITIONER

## 2017-04-04 PROCEDURE — 99213 OFFICE O/P EST LOW 20 MIN: CPT | Performed by: NURSE PRACTITIONER

## 2017-04-04 PROCEDURE — 96372 THER/PROPH/DIAG INJ SC/IM: CPT | Performed by: NURSE PRACTITIONER

## 2017-04-04 RX ORDER — DEXAMETHASONE SODIUM PHOSPHATE 10 MG/ML
10 INJECTION INTRAMUSCULAR; INTRAVENOUS ONCE
Status: COMPLETED | OUTPATIENT
Start: 2017-04-04 | End: 2017-04-04

## 2017-04-04 RX ORDER — CEFTRIAXONE 1 G/1
1 INJECTION, POWDER, FOR SOLUTION INTRAMUSCULAR; INTRAVENOUS ONCE
Status: COMPLETED | OUTPATIENT
Start: 2017-04-04 | End: 2017-04-04

## 2017-04-04 RX ADMIN — DEXAMETHASONE SODIUM PHOSPHATE 10 MG: 10 INJECTION INTRAMUSCULAR; INTRAVENOUS at 11:49

## 2017-04-04 RX ADMIN — CEFTRIAXONE 1 G: 1 INJECTION, POWDER, FOR SOLUTION INTRAMUSCULAR; INTRAVENOUS at 11:48

## 2017-04-04 NOTE — PROGRESS NOTES
Chief Complaint   Patient presents with   • Sore Throat   • Headache   • Fatigue        Allergies   Allergen Reactions   • Hydrocodone Nausea And Vomiting   • Dilaudid [Hydromorphone] Irritability   • Adhesive Tape Hives       History provided by: self     HPI:  Subjective   Vidhi Peña is a 22 y.o. female presents today with Complaints of sore throat and sinus pressure that started 3 days ago with associated headache.  Rates overall 6/10 and says that it has gotten progressively worse over the last 24 hours.  Took tylenol last night with little relief.  Has a cough.      PCP currently listed as Bronson Segal MD.       Past Medical History:   Diagnosis Date   • Miscarriage      Past Surgical History:   Procedure Laterality Date   • DILATATION AND CURETTAGE     • SHOULDER SURGERY Right    • WISDOM TOOTH EXTRACTION       Social History     Social History   • Marital status: Single     Spouse name: N/A   • Number of children: N/A   • Years of education: N/A     Social History Main Topics   • Smoking status: Never Smoker   • Smokeless tobacco: Never Used   • Alcohol use Yes      Comment: occassional   • Drug use: No   • Sexual activity: Defer     Other Topics Concern   • None     Social History Narrative     Family History   Problem Relation Age of Onset   • Hypertension Maternal Grandfather    • Hyperlipidemia Maternal Grandfather    • Heart disease Maternal Grandfather    • Cancer Paternal Grandmother        Current Outpatient Prescriptions on File Prior to Visit   Medication Sig Dispense Refill   • naltrexone-bupropion ER (CONTRAVE) 8-90 MG tablet wk 1:  1 tablet daily  Wk 2: 1 tablet twice a day  Wk 3: 2 tablets in AM and 1 tablet in PM  Wk 4: 2 tablets twice a day 70 tablet 0   • norgestimate-ethinyl estradiol (ORTHO TRI-CYCLEN, 28,) 0.18/0.215/0.25 MG-35 MCG per tablet Take 1 tablet by mouth Daily.     • oxyCODONE-acetaminophen (PERCOCET) 7.5-325 MG per tablet Take 1 tablet by mouth Every 4 (Four)  "Hours As Needed for moderate pain (4-6). 12 tablet 0     No current facility-administered medications on file prior to visit.         ROS:Constitutional: Negative for appetite change.   HENT:  ear pain, rhinorrhea and sinus pressure.    Respiratory: Negative for chest tightness and shortness of breath.   Cardiology:  Negative for Chest pain, numbness or tingling   Gastrointestinal: Negative for diarrhea.   Genitourinary: Negative for dysuria, flank pain and urgency.   \"All other systems reviewed and negative, except as listed above.”          OBJECTIVE:  General appearance: alert, appears stated age and cooperative  Head: Normocephalic, without obvious abnormality, atraumatic  Eyes: conjunctivae/corneas clear. PERRL, EOM's intact.  Ears: TM's are dull bilaterally  Nose: Nares normal. Septum midline. Mucosa normal. Has frontal and maxillary sinus tenderness.  Throat: abnormal findings: pharyngeal Erythema, no tonsillar exudate bilaterally.  Tonsils are enlarged 1+, forschemier spots no, strawberry tongue no.  Neck: mild anterior cervical adenopathy, supple, symmetrical, trachea midline and thyroid not enlarged, symmetric, no tenderness/mass/nodules  Lungs: clear to auscultation bilaterally  Abdomen:  Soft, non tender, non distended. Bowel sounds present all quadrants.  No rebound, no guarding, no hepatosplenomegaly in supine or decubitus positions.  Heart: regular rate and rhythm, S1, S2 normal, no murmur, click, rub or gallop  Abdomen: soft, non-tender; bowel sounds normal; no masses,  no organomegaly  Extremities: extremities normal, atraumatic, no cyanosis or edema  Skin: Skin color, texture, turgor normal. No rashes or lesions.  Scarlatiniform Rash: no  Lymph nodes: supraclavicular, and axillary nodes normal. Anterior cervical LN enlarged along zone 2 with tenderness. Nodes are <2cm in size.  Anterior chain without deeper cervical chain involvement.   Neurologic: Alert and oriented X 3, normal strength and tone. " "Normal coordination and gait. No obvious cranial nerve defects.   /72  Pulse 75  Temp 97.9 °F (36.6 °C)  Ht 67\" (170.2 cm)  Wt 185 lb (83.9 kg)  LMP  (LMP Unknown)  SpO2 98%  BMI 28.98 kg/m2    Assessment/Plan  Vidhi was seen today for sore throat, headache and fatigue.    Diagnoses and all orders for this visit:    Pharyngitis due to other organism  -     dexamethasone (DECADRON) injection 10 mg; Inject 1 mL into the shoulder, thigh, or buttocks 1 (One) Time.  -     POC Rapid Strep A negative, declines culture    Acute non-recurrent maxillary sinusitis  -     cefTRIAXone (ROCEPHIN) injection 1 g; Inject 1 g into the shoulder, thigh, or buttocks 1 (One) Time.    Definition  Pharyngitis: Inflammation of the pharynx and surrounding lymph tissue (tonsils)    Ddx: Stomatitis, Rhinitis or sinusitis, post nasal drip, epiglottis, thyroiditis, Strep, URI, mononucleosis, herpangina, Kawasaki disease, rubella, rubeola, peritonsilar abscess             An After Visit Summary was printed and given to the patient at discharge.       Return if symptoms worsen or fail to improve.    Maddi Haji, DNP, APRN-BC  "

## 2017-04-04 NOTE — PATIENT INSTRUCTIONS

## 2017-04-05 DIAGNOSIS — J02.8 ACUTE PHARYNGITIS DUE TO OTHER SPECIFIED ORGANISMS: Primary | ICD-10-CM

## 2017-04-05 RX ORDER — AZITHROMYCIN 250 MG/1
TABLET, FILM COATED ORAL
Qty: 6 TABLET | Refills: 0 | Status: SHIPPED | OUTPATIENT
Start: 2017-04-05 | End: 2017-09-13

## 2017-04-30 ENCOUNTER — APPOINTMENT (OUTPATIENT)
Dept: GENERAL RADIOLOGY | Facility: HOSPITAL | Age: 22
End: 2017-04-30

## 2017-04-30 ENCOUNTER — APPOINTMENT (OUTPATIENT)
Dept: CT IMAGING | Facility: HOSPITAL | Age: 22
End: 2017-04-30

## 2017-04-30 ENCOUNTER — HOSPITAL ENCOUNTER (EMERGENCY)
Facility: HOSPITAL | Age: 22
Discharge: HOME | End: 2017-04-30
Admitting: EMERGENCY MEDICINE

## 2017-04-30 VITALS
DIASTOLIC BLOOD PRESSURE: 80 MMHG | SYSTOLIC BLOOD PRESSURE: 119 MMHG | OXYGEN SATURATION: 98 % | BODY MASS INDEX: 29.47 KG/M2 | HEART RATE: 68 BPM | TEMPERATURE: 98.3 F | HEIGHT: 67 IN | WEIGHT: 187.8 LBS | RESPIRATION RATE: 20 BRPM

## 2017-04-30 DIAGNOSIS — M62.838 MUSCLE SPASMS OF NECK: ICD-10-CM

## 2017-04-30 DIAGNOSIS — S16.1XXA CERVICAL STRAIN, INITIAL ENCOUNTER: Primary | ICD-10-CM

## 2017-04-30 LAB
B-HCG UR QL: NEGATIVE
INTERNAL NEGATIVE CONTROL: NEGATIVE
INTERNAL POSITIVE CONTROL: POSITIVE
Lab: NORMAL

## 2017-04-30 PROCEDURE — 99283 EMERGENCY DEPT VISIT LOW MDM: CPT

## 2017-04-30 PROCEDURE — 25010000002 KETOROLAC TROMETHAMINE PER 15 MG: Performed by: NURSE PRACTITIONER

## 2017-04-30 PROCEDURE — 72050 X-RAY EXAM NECK SPINE 4/5VWS: CPT

## 2017-04-30 PROCEDURE — 25010000002 ORPHENADRINE CITRATE PER 60 MG: Performed by: NURSE PRACTITIONER

## 2017-04-30 PROCEDURE — 96372 THER/PROPH/DIAG INJ SC/IM: CPT

## 2017-04-30 PROCEDURE — 72125 CT NECK SPINE W/O DYE: CPT

## 2017-04-30 RX ORDER — OXYCODONE AND ACETAMINOPHEN 7.5; 325 MG/1; MG/1
1 TABLET ORAL EVERY 4 HOURS PRN
Qty: 12 TABLET | Refills: 0 | Status: SHIPPED | OUTPATIENT
Start: 2017-04-30 | End: 2017-09-13

## 2017-04-30 RX ORDER — CYCLOBENZAPRINE HCL 10 MG
10 TABLET ORAL 3 TIMES DAILY PRN
Qty: 20 TABLET | Refills: 0 | Status: SHIPPED | OUTPATIENT
Start: 2017-04-30 | End: 2017-09-13

## 2017-04-30 RX ORDER — METHYLPREDNISOLONE 4 MG/1
TABLET ORAL
Qty: 21 TABLET | Refills: 0 | Status: SHIPPED | OUTPATIENT
Start: 2017-04-30 | End: 2017-09-13

## 2017-04-30 RX ORDER — KETOROLAC TROMETHAMINE 30 MG/ML
60 INJECTION, SOLUTION INTRAMUSCULAR; INTRAVENOUS ONCE
Status: COMPLETED | OUTPATIENT
Start: 2017-04-30 | End: 2017-04-30

## 2017-04-30 RX ORDER — ORPHENADRINE CITRATE 30 MG/ML
60 INJECTION INTRAMUSCULAR; INTRAVENOUS ONCE
Status: COMPLETED | OUTPATIENT
Start: 2017-04-30 | End: 2017-04-30

## 2017-04-30 RX ORDER — OXYCODONE AND ACETAMINOPHEN 7.5; 325 MG/1; MG/1
1 TABLET ORAL ONCE
Status: COMPLETED | OUTPATIENT
Start: 2017-04-30 | End: 2017-04-30

## 2017-04-30 RX ADMIN — OXYCODONE HYDROCHLORIDE AND ACETAMINOPHEN 1 TABLET: 7.5; 325 TABLET ORAL at 12:33

## 2017-04-30 RX ADMIN — KETOROLAC TROMETHAMINE 60 MG: 30 INJECTION, SOLUTION INTRAMUSCULAR at 12:12

## 2017-04-30 RX ADMIN — ORPHENADRINE CITRATE 60 MG: 30 INJECTION INTRAMUSCULAR; INTRAVENOUS at 12:13

## 2017-05-11 ENCOUNTER — CLINICAL SUPPORT (OUTPATIENT)
Dept: FAMILY MEDICINE CLINIC | Facility: CLINIC | Age: 22
End: 2017-05-11

## 2017-05-11 DIAGNOSIS — J06.9 UPPER RESPIRATORY TRACT INFECTION, UNSPECIFIED TYPE: Primary | ICD-10-CM

## 2017-05-11 PROCEDURE — 96372 THER/PROPH/DIAG INJ SC/IM: CPT | Performed by: FAMILY MEDICINE

## 2017-05-11 RX ORDER — DEXAMETHASONE SODIUM PHOSPHATE 10 MG/ML
10 INJECTION INTRAMUSCULAR; INTRAVENOUS ONCE
Status: COMPLETED | OUTPATIENT
Start: 2017-05-11 | End: 2017-05-12

## 2017-05-12 RX ADMIN — DEXAMETHASONE SODIUM PHOSPHATE 10 MG: 10 INJECTION INTRAMUSCULAR; INTRAVENOUS at 08:58

## 2017-09-13 ENCOUNTER — OFFICE VISIT (OUTPATIENT)
Dept: FAMILY MEDICINE CLINIC | Facility: CLINIC | Age: 22
End: 2017-09-13

## 2017-09-13 VITALS
HEIGHT: 67 IN | HEART RATE: 85 BPM | DIASTOLIC BLOOD PRESSURE: 68 MMHG | SYSTOLIC BLOOD PRESSURE: 118 MMHG | WEIGHT: 189.56 LBS | OXYGEN SATURATION: 97 % | BODY MASS INDEX: 29.75 KG/M2

## 2017-09-13 DIAGNOSIS — E66.09 NON MORBID OBESITY DUE TO EXCESS CALORIES: ICD-10-CM

## 2017-09-13 DIAGNOSIS — G43.009 MIGRAINE WITHOUT AURA AND WITHOUT STATUS MIGRAINOSUS, NOT INTRACTABLE: Primary | ICD-10-CM

## 2017-09-13 DIAGNOSIS — E28.2 PCOS (POLYCYSTIC OVARIAN SYNDROME): ICD-10-CM

## 2017-09-13 PROCEDURE — 99203 OFFICE O/P NEW LOW 30 MIN: CPT | Performed by: FAMILY MEDICINE

## 2017-09-13 RX ORDER — TOPIRAMATE 50 MG/1
50 TABLET, FILM COATED ORAL 2 TIMES DAILY
Qty: 30 TABLET | Refills: 3 | Status: SHIPPED | OUTPATIENT
Start: 2017-09-13 | End: 2017-11-22 | Stop reason: HOSPADM

## 2017-09-13 NOTE — PATIENT INSTRUCTIONS
Migraine Headache  A migraine headache is an intense, throbbing pain on one or both sides of your head. A migraine can last for 30 minutes to several hours.  CAUSES   The exact cause of a migraine headache is not always known. However, a migraine may be caused when nerves in the brain become irritated and release chemicals that cause inflammation. This causes pain.  Certain things may also trigger migraines, such as:  · Alcohol.  · Smoking.  · Stress.  · Menstruation.  · Aged cheeses.  · Foods or drinks that contain nitrates, glutamate, aspartame, or tyramine.  · Lack of sleep.  · Chocolate.  · Caffeine.  · Hunger.  · Physical exertion.  · Fatigue.  · Medicines used to treat chest pain (nitroglycerine), birth control pills, estrogen, and some blood pressure medicines.  SIGNS AND SYMPTOMS  · Pain on one or both sides of your head.  · Pulsating or throbbing pain.  · Severe pain that prevents daily activities.  · Pain that is aggravated by any physical activity.  · Nausea, vomiting, or both.  · Dizziness.  · Pain with exposure to bright lights, loud noises, or activity.  · General sensitivity to bright lights, loud noises, or smells.  Before you get a migraine, you may get warning signs that a migraine is coming (aura). An aura may include:  · Seeing flashing lights.  · Seeing bright spots, halos, or zigzag lines.  · Having tunnel vision or blurred vision.  · Having feelings of numbness or tingling.  · Having trouble talking.  · Having muscle weakness.  DIAGNOSIS   A migraine headache is often diagnosed based on:  · Symptoms.  · Physical exam.  · A CT scan or MRI of your head. These imaging tests cannot diagnose migraines, but they can help rule out other causes of headaches.  TREATMENT  Medicines may be given for pain and nausea. Medicines can also be given to help prevent recurrent migraines.   HOME CARE INSTRUCTIONS  · Only take over-the-counter or prescription medicines for pain or discomfort as directed by your  health care provider. The use of long-term narcotics is not recommended.  · Lie down in a dark, quiet room when you have a migraine.  · Keep a journal to find out what may trigger your migraine headaches. For example, write down:    What you eat and drink.    How much sleep you get.    Any change to your diet or medicines.  · Limit alcohol consumption.  · Quit smoking if you smoke.  · Get 7-9 hours of sleep, or as recommended by your health care provider.  · Limit stress.  · Keep lights dim if bright lights bother you and make your migraines worse.  SEEK IMMEDIATE MEDICAL CARE IF:   · Your migraine becomes severe.  · You have a fever.  · You have a stiff neck.  · You have vision loss.  · You have muscular weakness or loss of muscle control.  · You start losing your balance or have trouble walking.  · You feel faint or pass out.  · You have severe symptoms that are different from your first symptoms.  MAKE SURE YOU:   · Understand these instructions.  · Will watch your condition.  · Will get help right away if you are not doing well or get worse.     This information is not intended to replace advice given to you by your health care provider. Make sure you discuss any questions you have with your health care provider.     Document Released: 12/18/2006 Document Revised: 01/08/2016 Document Reviewed: 08/25/2014  [employer] Interactive Patient Education ©2017 [employer]    Fatigue  Fatigue is feeling tired all of the time, a lack of energy, or a lack of motivation. Occasional or mild fatigue is often a normal response to activity or life in general. However, long-lasting (chronic) or extreme fatigue may indicate an underlying medical condition.  HOME CARE INSTRUCTIONS   Watch your fatigue for any changes. The following actions may help to lessen any discomfort you are feeling:  · Talk to your health care provider about how much sleep you need each night. Try to get the required amount every night.  · Take medicines  only as directed by your health care provider.  · Eat a healthy and nutritious diet. Ask your health care provider if you need help changing your diet.  · Drink enough fluid to keep your urine clear or pale yellow.  · Practice ways of relaxing, such as yoga, meditation, massage therapy, or acupuncture.  · Exercise regularly.    · Change situations that cause you stress. Try to keep your work and personal routine reasonable.  · Do not abuse illegal drugs.  · Limit alcohol intake to no more than 1 drink per day for nonpregnant women and 2 drinks per day for men. One drink equals 12 ounces of beer, 5 ounces of wine, or 1½ ounces of hard liquor.  · Take a multivitamin, if directed by your health care provider.  SEEK MEDICAL CARE IF:   · Your fatigue does not get better.  · You have a fever.    · You have unintentional weight loss or gain.  · You have headaches.    · You have difficulty:      Falling asleep.    Sleeping throughout the night.  · You feel angry, guilty, anxious, or sad.     · You are unable to have a bowel movement (constipation).    · You skin is dry.     · Your legs or another part of your body is swollen.    SEEK IMMEDIATE MEDICAL CARE IF:   · You feel confused.    · Your vision is blurry.  · You feel faint or pass out.    · You have a severe headache.    · You have severe abdominal, pelvic, or back pain.    · You have chest pain, shortness of breath, or an irregular or fast heartbeat.    · You are unable to urinate or you urinate less than normal.    · You develop abnormal bleeding, such as bleeding from the rectum, vagina, nose, lungs, or nipples.  · You vomit blood.     · You have thoughts about harming yourself or committing suicide.    · You are worried that you might harm someone else.       This information is not intended to replace advice given to you by your health care provider. Make sure you discuss any questions you have with your health care provider.     Document Released: 10/14/2008  Document Revised: 04/10/2017 Document Reviewed: 04/21/2015  [employer] Interactive Patient Education ©2017 [employer]

## 2017-09-13 NOTE — PROGRESS NOTES
Subjective   Vidhi Peña is a 22 y.o. female.     HPI Comments: Patient has had two miscarriages related to PCOS, not currently trying to get pregnant, not desiring to get pregnant. Not currently on birth control because she did not like the side effects. Has OBGYN that she follows up with for this issue. Was prescribed metformin but is not currently taking. She and I discussed nutrition and exercise for her current weight.  Reports for that for the past two weeks she has been feeling very fatigued. She denies any issues falling or staying asleep but reports that she feels as if she hasn't slept at all when she wakes up. Denies any snoring, allergies, symptoms of illness. LMP on 8/28/2017 which is regular. Discussed possibility that this could be allergies or beginning of a viral syndrome or from change in seasons and to let me know if the problem persists or fails to improve. Recommend awareness of diet and exercise as that can affect mood and energy levels.    Migraine    This is a chronic problem. The current episode started more than 1 year ago. The problem occurs intermittently (has 1-2 per week at most, occure occasionally.). The problem has been unchanged. The pain is located in the frontal region. The pain radiates to the right neck and left neck. The quality of the pain is described as throbbing. The pain is at a severity of 5/10. The pain is moderate. Pertinent negatives include no abdominal pain, back pain, blurred vision, coughing, dizziness, ear pain, eye pain, fever, nausea, sinus pressure, sore throat or weakness. Nothing aggravates the symptoms. She has tried darkened room for the symptoms. The treatment provided moderate relief.        The following portions of the patient's history were reviewed and updated as appropriate: allergies, current medications, past family history, past medical history, past social history, past surgical history and problem list.    Social History     Social History    • Marital status: Single     Spouse name: N/A   • Number of children: N/A   • Years of education: N/A     Occupational History   • Not on file.     Social History Main Topics   • Smoking status: Never Smoker   • Smokeless tobacco: Never Used   • Alcohol use Yes      Comment: occassional   • Drug use: No   • Sexual activity: Yes     Partners: Male     Birth control/ protection: Condom     Other Topics Concern   • Not on file     Social History Narrative         There is no immunization history on file for this patient.     Current Outpatient Prescriptions on File Prior to Visit   Medication Sig Dispense Refill   • [DISCONTINUED] azithromycin (ZITHROMAX) 250 MG tablet Take 2 tablets the first day, then 1 tablet daily for 4 days. 6 tablet 0   • [DISCONTINUED] cyclobenzaprine (FLEXERIL) 10 MG tablet Take 1 tablet by mouth 3 (Three) Times a Day As Needed for Muscle Spasms. 20 tablet 0   • [DISCONTINUED] MethylPREDNISolone (MEDROL, EMILY,) 4 MG tablet Take as directed on package instructions. 21 tablet 0   • [DISCONTINUED] naltrexone-bupropion ER (CONTRAVE) 8-90 MG tablet wk 1:  1 tablet daily  Wk 2: 1 tablet twice a day  Wk 3: 2 tablets in AM and 1 tablet in PM  Wk 4: 2 tablets twice a day 70 tablet 0   • [DISCONTINUED] norgestimate-ethinyl estradiol (ORTHO TRI-CYCLEN, 28,) 0.18/0.215/0.25 MG-35 MCG per tablet Take 1 tablet by mouth Daily.     • [DISCONTINUED] oxyCODONE-acetaminophen (PERCOCET) 7.5-325 MG per tablet Take 1 tablet by mouth Every 4 (Four) Hours As Needed for moderate pain (4-6). 12 tablet 0   • [DISCONTINUED] oxyCODONE-acetaminophen (PERCOCET) 7.5-325 MG per tablet Take 1 tablet by mouth Every 4 (Four) Hours As Needed for Moderate Pain (4-6). 12 tablet 0     No current facility-administered medications on file prior to visit.        Review of Systems   Constitutional: Positive for fatigue. Negative for activity change, appetite change and fever.   HENT: Positive for postnasal drip. Negative for ear pain,  sinus pressure and sore throat.    Eyes: Negative for blurred vision, pain and visual disturbance.   Respiratory: Negative for cough and shortness of breath.    Cardiovascular: Negative for chest pain and palpitations.   Gastrointestinal: Negative for abdominal pain and nausea.   Endocrine: Negative for cold intolerance and heat intolerance.   Genitourinary: Negative for difficulty urinating and dysuria.   Musculoskeletal: Negative for arthralgias, back pain and gait problem.   Skin: Negative for color change and rash.   Neurological: Positive for headaches. Negative for dizziness and weakness.   Hematological: Negative for adenopathy. Does not bruise/bleed easily.   Psychiatric/Behavioral: Negative for agitation, confusion and sleep disturbance.       Objective   Physical Exam   Constitutional: She is oriented to person, place, and time. She appears well-developed and well-nourished.   HENT:   Head: Normocephalic and atraumatic.   Eyes: Conjunctivae, EOM and lids are normal. Pupils are equal, round, and reactive to light.   Neck: Normal range of motion. Neck supple.   Cardiovascular: Normal rate, regular rhythm and normal heart sounds.  Exam reveals no gallop and no friction rub.    No murmur heard.  Pulmonary/Chest: Effort normal and breath sounds normal.   Abdominal: Soft. Normal appearance and bowel sounds are normal. There is no tenderness.   Musculoskeletal: Normal range of motion.   Neurological: She is alert and oriented to person, place, and time.   Skin: Skin is warm, dry and intact.   Psychiatric: She has a normal mood and affect. Her speech is normal and behavior is normal. Judgment and thought content normal. Cognition and memory are normal.       Lab Results (most recent)     None          Vitals:    09/13/17 0942   BP: 118/68   Pulse: 85   SpO2: 97%       Assessment/Plan   Vidhi was seen today for establish care, migraine and nausea.    Diagnoses and all orders for this visit:    Migraine without  aura and without status migrainosus, not intractable  -     topiramate (TOPAMAX) 50 MG tablet; Take 1 tablet by mouth 2 (Two) Times a Day.    PCOS (polycystic ovarian syndrome)    Non morbid obesity due to excess calories      Patient reports being up to date with her vaccines, has had her HPV vaccine. She is unsure of her last PAP smear, will schedule her to come in to see me unless patient would prefer to continue following with her OB/GYN.         Risk score 3          This document has been electronically signed by Kristen Rae MD on September 13, 2017 10:19 AM

## 2017-09-15 NOTE — PROGRESS NOTES
I have reviewed the notes, assessments, and/or procedures performed. I concur with her/his documentation of Vidhi Peña.     Chas Malone, DO

## 2017-10-11 ENCOUNTER — OFFICE VISIT (OUTPATIENT)
Dept: FAMILY MEDICINE CLINIC | Facility: CLINIC | Age: 22
End: 2017-10-11

## 2017-10-11 VITALS
WEIGHT: 187.13 LBS | HEART RATE: 68 BPM | DIASTOLIC BLOOD PRESSURE: 64 MMHG | BODY MASS INDEX: 29.37 KG/M2 | HEIGHT: 67 IN | OXYGEN SATURATION: 98 % | SYSTOLIC BLOOD PRESSURE: 118 MMHG

## 2017-10-11 DIAGNOSIS — E28.2 PCOS (POLYCYSTIC OVARIAN SYNDROME): Primary | ICD-10-CM

## 2017-10-11 PROCEDURE — 99213 OFFICE O/P EST LOW 20 MIN: CPT | Performed by: FAMILY MEDICINE

## 2017-10-11 RX ORDER — DROSPIRENONE AND ETHINYL ESTRADIOL 0.03MG-3MG
1 KIT ORAL DAILY
Qty: 28 TABLET | Refills: 12 | Status: SHIPPED | OUTPATIENT
Start: 2017-10-11 | End: 2017-11-16 | Stop reason: ALTCHOICE

## 2017-10-11 NOTE — PROGRESS NOTES
Subjective   Vidhi Peña is a 22 y.o. female.     History of Present Illness  Patient is a 22-year-old  female that is presenting today with lower abdominal pain that is intermittent in nature.  Patient has a concurrent medical history of polycystic ovarian syndrome in which she was diagnosed with her previous physician back in [place], [place] based on pelvic ultrasound.  Patient states that she gets lower abdominal pain that is intermittent.  On a scale of 7 out of 10 and described as a dull aching sensation that can happen at any time and is only alleviated by time and rest.  Patient mentions that the only thing that worked for her in the past was birth control pill and she would like to go back on this.  Patient states right now that her periods are very heavy and they last approximately 7 days and she is having to use more than 12 pads a day to help control the bleeding.  Patient states that today is her first day of her period and she would like to be referred to OB/GYN.  Patient states that she had her HPV series of vaccinations when she was 13.      The following portions of the patient's history were reviewed and updated as appropriate: allergies, current medications, past family history, past medical history, past social history, past surgical history and problem list.    Review of Systems    Constitutional: no weight loss, fever, chills, weakness  HEENT: no visual loss, blurred vision, double vision, yellow scalarea  Skin: no rash, no discoloration   CVS: no chest pain, palpitations  Chest: no shortness of air, cough, dyspnea  GI: no abdominal pain, blood in stool, no nausea, vomiting, diarrhea  : no burning in urination, change in odor, no change in frequency, positive for heavy periods   Neuro: no headache, dizziness, syncope, paralysis, ataxia, numbness  Musculoskeletal: no muscle, back pain, joint pain, stiffness  Lymphatics: no enlarged nodes  Endo: no reports of sweating, cold or  "heat intolerance, no polyuria      Objective   Physical Exam  /64 (BP Location: Left arm, Patient Position: Sitting, Cuff Size: Adult)  Pulse 68  Ht 67\" (170.2 cm)  Wt 187 lb 2 oz (84.9 kg)  LMP 10/11/2017  SpO2 98%  BMI 29.31 kg/m2  Physical Exam    CONSTITUTIONAL: The vital signs showed that the patient was afebrile; blood pressure and heart rate were within normal limits. The patient appeared alert.  EYES: The conjunctiva was clear. The pupil was equal and reactive. There was no ptosis. The irides appeared normal.  EARS, NOSE AND THROAT: The ears and the nose appeared normal in appearance. Hearing was grossly intact. The oropharynx showed that the mucosa was moist. There was no lesion that I could see in the palate, tongue. tonsil or posterior pharynx.  NECK: The neck was supple. The thyroid gland was not enlarged by palpation.  RESPIRATORY: The patient’s respiratory effort was normal. Auscultation of the lung showed it to be clear with good air movement.  CARDIOVASCULAR: Auscultation of the heart revealed S1 and S2 with regular rate with no murmur noted. The extremities showed no edema.        Assessment/Plan   Vidhi was seen today for polycystic ovary syndrome.    Diagnoses and all orders for this visit:    PCOS (polycystic ovarian syndrome)  -     Ambulatory Referral to Obstetrics / Gynecology    Other orders  -     drospirenone-ethinyl estradiol (MIHIR,OCELLA) 3-0.03 MG per tablet; Take 1 tablet by mouth Daily.           This document has been electronically signed by Orville Garnett MD on October 11, 2017 11:00 AM               "

## 2017-11-16 ENCOUNTER — TELEPHONE (OUTPATIENT)
Dept: FAMILY MEDICINE CLINIC | Facility: CLINIC | Age: 22
End: 2017-11-16

## 2017-11-16 RX ORDER — LEVONORGESTREL AND ETHINYL ESTRADIOL 0.15-0.03
1 KIT ORAL DAILY
Qty: 28 TABLET | Refills: 12 | Status: SHIPPED | OUTPATIENT
Start: 2017-11-16 | End: 2017-11-22

## 2017-11-16 NOTE — TELEPHONE ENCOUNTER
PT REQUESTING A 3 MONTH SUPPLY OF THE MED YOU CALLED IN TODAY INSTEAD OF THE 1 MONTH. THE SEASONAL.

## 2017-11-16 NOTE — TELEPHONE ENCOUNTER
DR ROYAL    PATIENT CALLED TO ASK IF YOU WOULD SWITCH MEDICATION OCELLA TO INTROVAL    PLEASE SEND TO [facility] AND CALL HER WHEN DONE    THANK YOU

## 2017-11-22 ENCOUNTER — OFFICE VISIT (OUTPATIENT)
Dept: OBSTETRICS AND GYNECOLOGY | Facility: CLINIC | Age: 22
End: 2017-11-22

## 2017-11-22 VITALS
BODY MASS INDEX: 29.66 KG/M2 | SYSTOLIC BLOOD PRESSURE: 132 MMHG | HEIGHT: 67 IN | DIASTOLIC BLOOD PRESSURE: 88 MMHG | WEIGHT: 189 LBS

## 2017-11-22 DIAGNOSIS — E28.2 PCOS (POLYCYSTIC OVARIAN SYNDROME): Primary | ICD-10-CM

## 2017-11-22 PROCEDURE — 99202 OFFICE O/P NEW SF 15 MIN: CPT | Performed by: OBSTETRICS & GYNECOLOGY

## 2017-11-22 RX ORDER — LEVONORGESTREL AND ETHINYL ESTRADIOL 0.15-0.03
1 KIT ORAL DAILY
Qty: 28 TABLET | Refills: 12 | Status: SHIPPED | OUTPATIENT
Start: 2017-11-22 | End: 2017-11-22 | Stop reason: SDUPTHER

## 2017-11-22 RX ORDER — LEVONORGESTREL AND ETHINYL ESTRADIOL 0.15-0.03
1 KIT ORAL DAILY
Qty: 82 TABLET | Refills: 4 | Status: SHIPPED | OUTPATIENT
Start: 2017-11-22 | End: 2018-04-26

## 2017-11-26 NOTE — PROGRESS NOTES
"Subjective     Chief Complaint   Patient presents with   • Polycystic Ovary Syndrome       Vidhi Peña is a 22 y.o.  presents today to discuss her PCOS.  Patient states several years ago she was diagnosed with PCOS.  From the age of 13 til 19 she was on Seasonique for management of her irregular periods and cyst, however, she stopped.  She states that her and fiance tried to get pregnant, however, had a miscarriage, and now she has decided she would like to go back on her birth control and to restart trying after their wedding in April.  She states she was given Ocella by her PCP, but she would like to be started on the generic form of Seasonique.      She also heard from her PCP that if she was started on Metformin it would help her get pregnant and would help with her \"flares of PCOS.\"    Past Medical History:   Diagnosis Date   • Miscarriage      Past Surgical History:   Procedure Laterality Date   • DILATATION AND CURETTAGE     • SHOULDER SURGERY Right    • WISDOM TOOTH EXTRACTION       Social History     Social History   • Marital status: Single     Spouse name: N/A   • Number of children: N/A   • Years of education: N/A     Occupational History   • Not on file.     Social History Main Topics   • Smoking status: Never Smoker   • Smokeless tobacco: Never Used   • Alcohol use Yes      Comment: occassional   • Drug use: No   • Sexual activity: Yes     Partners: Male     Birth control/ protection: Condom     Other Topics Concern   • Not on file     Social History Narrative     Family History   Problem Relation Age of Onset   • Hypertension Mother    • Hypertension Maternal Grandfather    • Hyperlipidemia Maternal Grandfather    • Heart disease Maternal Grandfather    • Breast cancer Paternal Grandmother 50   • Colon cancer Paternal Uncle 50   • Diabetes Paternal Aunt      unsure which type or age of onset     Objective      /88  Ht 67\" (170.2 cm)  Wt 189 lb (85.7 kg)  LMP 11/15/2017 (Exact " Date)  BMI 29.6 kg/m2    Physical Exam  General:   Appears stated age, AAOx3, NAD   Neurologic: CN II - XII grossly intact       Assessment/Plan     ASSESSMENT  1. PCOS (polycystic ovarian syndrome)        PLAN  1. PCOS  - Birth control changed per patient request  - Again reviewed risk of OCPs, including the potential development of hypertension, DVTs, and CVA.  Discussed that these risks are low, especially given that she has tolerated OCPs in past  - Reviewed of chart showed PMH significant for migraines; patient states it is just migraines and denies every having auras prior to migraines.   - Discussed metformin and role in PCOS.  Discussed that Metformin and Clomid in PCOS has been showed to increase conception rate but not live birth rates in women with insulin resistance.  However, there is some literature that suggest start Metformin as well as encouraging life style changes with diet and exercise prior to starting conceived has been shown to be beneficial because it helps decreasing circulating insulin levels. Given desire to conceive following wedding in April we discussed the options of trying.  Patient desires too.  Will start metformin 500mg daily.  Discussed potential common side effects including GI upset.    - Encouraged starting PNV now.   - RTC in 3 months for follow up.     New Medications Ordered This Visit   Medications   • levonorgestrel-ethinyl estradiol (NORDETTE) 0.15-30 MG-MCG per tablet     Sig: Take 1 tablet by mouth Daily.     Dispense:  82 tablet     Refill:  4   • metFORMIN (GLUCOPHAGE) 500 MG tablet     Sig: Take 1 tablet by mouth Daily With Breakfast.     Dispense:  30 tablet     Refill:  11       Qing GRAY Friday, MD  11/25/2017

## 2017-12-11 ENCOUNTER — OFFICE VISIT (OUTPATIENT)
Dept: PODIATRY | Facility: CLINIC | Age: 22
End: 2017-12-11

## 2017-12-11 VITALS
SYSTOLIC BLOOD PRESSURE: 129 MMHG | BODY MASS INDEX: 28.88 KG/M2 | WEIGHT: 184 LBS | DIASTOLIC BLOOD PRESSURE: 87 MMHG | HEIGHT: 67 IN | HEART RATE: 87 BPM | OXYGEN SATURATION: 98 %

## 2017-12-11 DIAGNOSIS — M79.672 LEFT FOOT PAIN: Primary | ICD-10-CM

## 2017-12-11 PROCEDURE — 99202 OFFICE O/P NEW SF 15 MIN: CPT | Performed by: PODIATRIST

## 2017-12-11 NOTE — PROGRESS NOTES
Vidhi Peña  1995  22 y.o. female     Patient presents today with a complaint of left foot pain.    12/11/2017  Chief Complaint   Patient presents with   • Left Foot - Pain           History of Present Illness    Vidhi Peña is a 22 y.o.female who presents with chief complaint of left foot pain.  She states that over the weekend he fell down the basement steps and injured her left foot.  The toes on her left foot are swollen and painful.  She rates the pain as a 7 out of 10.  She describes it as achy.  She has no other pedal complaints.          Past Medical History:   Diagnosis Date   • Miscarriage          Past Surgical History:   Procedure Laterality Date   • DILATATION AND CURETTAGE     • SHOULDER SURGERY Right    • WISDOM TOOTH EXTRACTION           Family History   Problem Relation Age of Onset   • Hypertension Mother    • Hypertension Maternal Grandfather    • Hyperlipidemia Maternal Grandfather    • Heart disease Maternal Grandfather    • Breast cancer Paternal Grandmother 50   • Colon cancer Paternal Uncle 50   • Diabetes Paternal Aunt      unsure which type or age of onset       Allergies   Allergen Reactions   • Hydrocodone Nausea And Vomiting   • Dilaudid [Hydromorphone] Irritability   • Adhesive Tape Hives       Social History     Social History   • Marital status: Single     Spouse name: N/A   • Number of children: N/A   • Years of education: N/A     Occupational History   • Not on file.     Social History Main Topics   • Smoking status: Never Smoker   • Smokeless tobacco: Never Used   • Alcohol use Yes      Comment: occassional   • Drug use: No   • Sexual activity: Yes     Partners: Male     Birth control/ protection: Condom     Other Topics Concern   • Not on file     Social History Narrative         Current Outpatient Prescriptions   Medication Sig Dispense Refill   • levonorgestrel-ethinyl estradiol (NORDETTE) 0.15-30 MG-MCG per tablet Take 1 tablet by mouth Daily. 82 tablet 4   •  "metFORMIN (GLUCOPHAGE) 500 MG tablet Take 1 tablet by mouth Daily With Breakfast. 30 tablet 11     No current facility-administered medications for this visit.          OBJECTIVE    /87  Pulse 87  Ht 170.2 cm (67\")  Wt 83.5 kg (184 lb)  LMP 11/15/2017 (Exact Date)  SpO2 98%  BMI 28.82 kg/m2      Review of Systems   Constitutional: Negative.    HENT: Negative.    Eyes: Negative.    Respiratory: Negative.    Cardiovascular: Negative.    Gastrointestinal: Negative.    Endocrine: Negative.    Genitourinary: Negative.    Musculoskeletal:        Left foot pain  ankle pain     Skin: Negative.    Allergic/Immunologic: Negative.    Neurological: Negative.    Hematological: Negative.    Psychiatric/Behavioral: Negative.          Constitutional: well developed, well nourished    HEENT: Normocephalic and atraumatic, normal hearing    Respiratory: Non labored respirations noted    Cardiovascular:    DP/PT pulses palpable    CFT brisk  to all digits   Edema and ecchymosis left 2nd and 3rd digits    Musculoskeletal:  Muscle strength is 5/5 for all muscle groups tested   ROM of the 1st MTP is full without pain or crepitus  ROM of the MTJ is full without pain or crepitus    ROM of the STJ is full without pain or crepitus    ROM of the ankle joint is full without pain or crepitus    POP to  2nd and 3rd digis left foot    Dermatological:   Skin is warm, dry and intact    Webspaces 1-4 bilateral are clean, dry and intact.   No subcutaneous nodules or masses noted      Neurological:   Sensation intact to light touch    DTR intact    Psychiatric: A&O x 3 with normal mood and affect. NAD.     Radiographs: 3 views of the left foot were obtained today.  No acute osseous abnormalities noted.        Procedures        ASSESSMENT AND PLAN    Vidhi was seen today for pain.    Diagnoses and all orders for this visit:    Left foot pain  -     XR Foot 3+ View Left      - Comprehensive foot and ankle exam performed.   - X-rays taken " and reviewed  - Ice and elevate  - Tylenol for pain  - All questions were answered to the patients satisfaction.  - RTC as needed            This document has been electronically signed by Max Juarez DPM on December 11, 2017 1:39 PM     12/11/2017  1:39 PM

## 2018-01-19 ENCOUNTER — TELEPHONE (OUTPATIENT)
Dept: OBSTETRICS AND GYNECOLOGY | Facility: CLINIC | Age: 23
End: 2018-01-19

## 2018-01-19 PROCEDURE — 99284 EMERGENCY DEPT VISIT MOD MDM: CPT

## 2018-01-19 NOTE — TELEPHONE ENCOUNTER
----- Message from Jacquelyn Pineda sent at 1/19/2018  8:00 AM CST -----  Contact: [phone]  Pt called and was put on birth control a few months ago and is having a brown discharge and was wondering if that is normal. She would like a call back. If no answer she will call back. She will be at work.    Thanks

## 2018-01-19 NOTE — TELEPHONE ENCOUNTER
----- Message from Jacquelyn Pineda sent at 1/19/2018  8:00 AM CST -----  Contact: [phone]  Pt called and was put on birth control a few months ago and is having a brown discharge and was wondering if that is normal. She would like a call back. If no answer she will call back. She will be at work.    Thanks        Called and addressed all of patients concerns

## 2018-01-19 NOTE — TELEPHONE ENCOUNTER
----- Message from Jacquelyn Pineda sent at 1/19/2018  8:00 AM CST -----  Contact: [phone]  Pt called and was put on birth control a few months ago and is having a brown discharge and was wondering if that is normal. She would like a call back. If no answer she will call back. She will be at work.    Thanks

## 2018-01-19 NOTE — TELEPHONE ENCOUNTER
----- Message from Jacquelyn Pineda sent at 1/19/2018  8:00 AM CST -----  Contact: [phone]  Pt called and was put on birth control a few months ago and is having a brown discharge and was wondering if that is normal. She would like a call back. If no answer she will call back. She will be at work.    Thanks

## 2018-01-20 ENCOUNTER — HOSPITAL ENCOUNTER (EMERGENCY)
Facility: HOSPITAL | Age: 23
Discharge: HOME | End: 2018-01-20
Attending: FAMILY MEDICINE | Admitting: FAMILY MEDICINE

## 2018-01-20 VITALS
WEIGHT: 185 LBS | HEIGHT: 67 IN | RESPIRATION RATE: 20 BRPM | HEART RATE: 79 BPM | OXYGEN SATURATION: 99 % | SYSTOLIC BLOOD PRESSURE: 134 MMHG | TEMPERATURE: 98.7 F | BODY MASS INDEX: 29.03 KG/M2 | DIASTOLIC BLOOD PRESSURE: 92 MMHG

## 2018-01-20 DIAGNOSIS — N93.9 VAGINAL BLEEDING: Primary | ICD-10-CM

## 2018-01-20 LAB
ALBUMIN SERPL-MCNC: 4.8 G/DL (ref 3.4–4.8)
ALBUMIN/GLOB SERPL: 1.4 G/DL (ref 1.1–1.8)
ALP SERPL-CCNC: 89 U/L (ref 38–126)
ALT SERPL W P-5'-P-CCNC: 27 U/L (ref 9–52)
ANION GAP SERPL CALCULATED.3IONS-SCNC: 15 MMOL/L (ref 5–15)
AST SERPL-CCNC: 21 U/L (ref 14–36)
BASOPHILS # BLD AUTO: 0.04 10*3/MM3 (ref 0–0.2)
BASOPHILS NFR BLD AUTO: 0.3 % (ref 0–2)
BILIRUB SERPL-MCNC: 0.4 MG/DL (ref 0.2–1.3)
BUN BLD-MCNC: 13 MG/DL (ref 7–21)
BUN/CREAT SERPL: 12.9 (ref 7–25)
C TRACH RRNA CVX QL NAA+PROBE: NEGATIVE
CALCIUM SPEC-SCNC: 10 MG/DL (ref 8.4–10.2)
CANDIDA ALBICANS: NEGATIVE
CHLORIDE SERPL-SCNC: 103 MMOL/L (ref 95–110)
CO2 SERPL-SCNC: 23 MMOL/L (ref 22–31)
CREAT BLD-MCNC: 1.01 MG/DL (ref 0.5–1)
DEPRECATED RDW RBC AUTO: 38.2 FL (ref 36.4–46.3)
EOSINOPHIL # BLD AUTO: 0.09 10*3/MM3 (ref 0–0.7)
EOSINOPHIL NFR BLD AUTO: 0.8 % (ref 0–7)
ERYTHROCYTE [DISTWIDTH] IN BLOOD BY AUTOMATED COUNT: 12.7 % (ref 11.5–14.5)
GARDNERELLA VAGINALIS: NEGATIVE
GFR SERPL CREATININE-BSD FRML MDRD: 67 ML/MIN/1.73 (ref 60–165)
GLOBULIN UR ELPH-MCNC: 3.4 GM/DL (ref 2.3–3.5)
GLUCOSE BLD-MCNC: 98 MG/DL (ref 60–100)
HCT VFR BLD AUTO: 39.5 % (ref 35–45)
HGB BLD-MCNC: 13.9 G/DL (ref 12–15.5)
HOLD SPECIMEN: NORMAL
IMM GRANULOCYTES # BLD: 0.03 10*3/MM3 (ref 0–0.02)
IMM GRANULOCYTES NFR BLD: 0.3 % (ref 0–0.5)
LIPASE SERPL-CCNC: 191 U/L (ref 23–300)
LYMPHOCYTES # BLD AUTO: 4.66 10*3/MM3 (ref 0.6–4.2)
LYMPHOCYTES NFR BLD AUTO: 39.5 % (ref 10–50)
MCH RBC QN AUTO: 29 PG (ref 26.5–34)
MCHC RBC AUTO-ENTMCNC: 35.2 G/DL (ref 31.4–36)
MCV RBC AUTO: 82.3 FL (ref 80–98)
MONOCYTES # BLD AUTO: 0.81 10*3/MM3 (ref 0–0.9)
MONOCYTES NFR BLD AUTO: 6.9 % (ref 0–12)
N GONORRHOEA RRNA SPEC QL NAA+PROBE: NEGATIVE
NEUTROPHILS # BLD AUTO: 6.17 10*3/MM3 (ref 2–8.6)
NEUTROPHILS NFR BLD AUTO: 52.2 % (ref 37–80)
PLATELET # BLD AUTO: 232 10*3/MM3 (ref 150–450)
PMV BLD AUTO: 10.1 FL (ref 8–12)
POTASSIUM BLD-SCNC: 3.9 MMOL/L (ref 3.5–5.1)
PROT SERPL-MCNC: 8.2 G/DL (ref 6.3–8.6)
RBC # BLD AUTO: 4.8 10*6/MM3 (ref 3.77–5.16)
SODIUM BLD-SCNC: 141 MMOL/L (ref 137–145)
T VAGINALIS DNA VAG QL PROBE+SIG AMP: NEGATIVE
TRICHOMONAS VAGINALIS PCR: NEGATIVE
WBC NRBC COR # BLD: 11.8 10*3/MM3 (ref 3.2–9.8)
WHOLE BLOOD HOLD SPECIMEN: NORMAL

## 2018-01-20 PROCEDURE — 87510 GARDNER VAG DNA DIR PROBE: CPT | Performed by: FAMILY MEDICINE

## 2018-01-20 PROCEDURE — 87480 CANDIDA DNA DIR PROBE: CPT | Performed by: FAMILY MEDICINE

## 2018-01-20 PROCEDURE — 25010000002 KETOROLAC TROMETHAMINE PER 15 MG: Performed by: FAMILY MEDICINE

## 2018-01-20 PROCEDURE — 80053 COMPREHEN METABOLIC PANEL: CPT | Performed by: FAMILY MEDICINE

## 2018-01-20 PROCEDURE — 87660 TRICHOMONAS VAGIN DIR PROBE: CPT | Performed by: FAMILY MEDICINE

## 2018-01-20 PROCEDURE — 87661 TRICHOMONAS VAGINALIS AMPLIF: CPT | Performed by: FAMILY MEDICINE

## 2018-01-20 PROCEDURE — 87491 CHLMYD TRACH DNA AMP PROBE: CPT | Performed by: FAMILY MEDICINE

## 2018-01-20 PROCEDURE — 96374 THER/PROPH/DIAG INJ IV PUSH: CPT

## 2018-01-20 PROCEDURE — 87591 N.GONORRHOEAE DNA AMP PROB: CPT | Performed by: FAMILY MEDICINE

## 2018-01-20 PROCEDURE — 83690 ASSAY OF LIPASE: CPT | Performed by: FAMILY MEDICINE

## 2018-01-20 PROCEDURE — 85025 COMPLETE CBC W/AUTO DIFF WBC: CPT | Performed by: FAMILY MEDICINE

## 2018-01-20 RX ORDER — KETOROLAC TROMETHAMINE 15 MG/ML
15 INJECTION, SOLUTION INTRAMUSCULAR; INTRAVENOUS ONCE
Status: COMPLETED | OUTPATIENT
Start: 2018-01-20 | End: 2018-01-20

## 2018-01-20 RX ORDER — OXYCODONE AND ACETAMINOPHEN 7.5; 325 MG/1; MG/1
1 TABLET ORAL ONCE
Status: COMPLETED | OUTPATIENT
Start: 2018-01-20 | End: 2018-01-20

## 2018-01-20 RX ADMIN — KETOROLAC TROMETHAMINE 15 MG: 15 INJECTION, SOLUTION INTRAMUSCULAR; INTRAVENOUS at 02:21

## 2018-01-20 RX ADMIN — OXYCODONE HYDROCHLORIDE AND ACETAMINOPHEN 1 TABLET: 7.5; 325 TABLET ORAL at 01:04

## 2018-01-20 NOTE — ED PROVIDER NOTES
Subjective   HPI Comments: Pt with h/o ovarian cysts, placed on birth control 2 months ago. Brown vaginal discharge.     Patient is a 22 y.o. female presenting with abdominal pain.   Abdominal Pain   Pain location:  Suprapubic  Pain quality: aching    Pain radiates to:  Does not radiate  Pain severity:  Moderate  Onset quality:  Sudden  Duration:  12 hours  Timing:  Constant  Progression:  Waxing and waning  Chronicity:  New  Context: not alcohol use, not sick contacts, not suspicious food intake and not trauma    Relieved by:  Nothing  Worsened by:  Nothing  Ineffective treatments:  None tried  Associated symptoms: vaginal bleeding    Associated symptoms: no belching, no chest pain, no chills, no constipation, no cough, no diarrhea, no dysuria, no fatigue, no fever, no hematuria, no nausea, no shortness of breath, no sore throat, no vaginal discharge and no vomiting    Risk factors: obesity    Risk factors: not elderly and not pregnant        Review of Systems   Constitutional: Negative for appetite change, chills, diaphoresis, fatigue and fever.   HENT: Negative for congestion, ear discharge, ear pain, nosebleeds, rhinorrhea, sinus pressure, sore throat and trouble swallowing.    Eyes: Negative for discharge and redness.   Respiratory: Negative for apnea, cough, chest tightness, shortness of breath and wheezing.    Cardiovascular: Negative for chest pain.   Gastrointestinal: Positive for abdominal pain. Negative for constipation, diarrhea, nausea and vomiting.   Endocrine: Negative for polyuria.   Genitourinary: Positive for vaginal bleeding. Negative for dysuria, frequency, hematuria, urgency and vaginal discharge.   Musculoskeletal: Negative for myalgias and neck pain.   Skin: Negative for color change and rash.   Allergic/Immunologic: Negative for immunocompromised state.   Neurological: Negative for dizziness, seizures, syncope, weakness, light-headedness and headaches.   Hematological: Negative for  adenopathy. Does not bruise/bleed easily.   Psychiatric/Behavioral: Negative for behavioral problems and confusion.   All other systems reviewed and are negative.      Past Medical History:   Diagnosis Date   • Miscarriage        Allergies   Allergen Reactions   • Hydrocodone Nausea And Vomiting   • Dilaudid [Hydromorphone] Irritability   • Adhesive Tape Hives       Past Surgical History:   Procedure Laterality Date   • DILATATION AND CURETTAGE     • SHOULDER SURGERY Right    • WISDOM TOOTH EXTRACTION         Family History   Problem Relation Age of Onset   • Hypertension Mother    • Hypertension Maternal Grandfather    • Hyperlipidemia Maternal Grandfather    • Heart disease Maternal Grandfather    • Breast cancer Paternal Grandmother 50   • Colon cancer Paternal Uncle 50   • Diabetes Paternal Aunt      unsure which type or age of onset       Social History     Social History   • Marital status: Single     Spouse name: N/A   • Number of children: N/A   • Years of education: N/A     Social History Main Topics   • Smoking status: Never Smoker   • Smokeless tobacco: Never Used   • Alcohol use Yes      Comment: occassional   • Drug use: No   • Sexual activity: Yes     Partners: Male     Birth control/ protection: Condom     Other Topics Concern   • None     Social History Narrative           Objective   Physical Exam   Constitutional: She is oriented to person, place, and time. She appears well-developed and well-nourished.   HENT:   Head: Normocephalic and atraumatic.   Nose: Nose normal.   Mouth/Throat: Oropharynx is clear and moist.   Eyes: Conjunctivae and EOM are normal. Pupils are equal, round, and reactive to light. Right eye exhibits no discharge. Left eye exhibits no discharge. No scleral icterus.   Neck: Normal range of motion. Neck supple. No tracheal deviation present.   Cardiovascular: Normal rate, regular rhythm and normal heart sounds.    No murmur heard.  Pulmonary/Chest: Effort normal and breath sounds  normal. No stridor. No respiratory distress. She has no wheezes. She has no rales.   Abdominal: Soft. Bowel sounds are normal. She exhibits no distension and no mass. There is tenderness in the suprapubic area. There is no rebound and no guarding.   Musculoskeletal: She exhibits no edema.   Neurological: She is alert and oriented to person, place, and time. Coordination normal.   Skin: Skin is warm and dry. No rash noted. No erythema.   Psychiatric: She has a normal mood and affect. Her behavior is normal. Thought content normal.   Nursing note and vitals reviewed.      Procedures         ED Course  ED Course   Comment By Time   Initially ultrasound was unavailable however, eventually ultrasound was available and offered, but patient states she will see how she does over the weekend and follow up with her gynecologist if no improvement. Akbar Piña MD 01/20 0311          Labs Reviewed   COMPREHENSIVE METABOLIC PANEL - Abnormal; Notable for the following:        Result Value    Creatinine 1.01 (*)     All other components within normal limits   CBC WITH AUTO DIFFERENTIAL - Abnormal; Notable for the following:     WBC 11.80 (*)     Lymphocytes, Absolute 4.66 (*)     Immature Grans, Absolute 0.03 (*)     All other components within normal limits   LIPASE - Normal   ALIX ALBICANS, GARDNERELLA VAGINALIS, TRICHOMONAS VAGINALIS, PCR   CHLAMYDIA TRACHOMATIS, NEISSERIA GONORRHOEAE, PCR   URINALYSIS W/ CULTURE IF INDICATED   CBC AND DIFFERENTIAL    Narrative:     The following orders were created for panel order CBC & Differential.  Procedure                               Abnormality         Status                     ---------                               -----------         ------                     CBC Auto Differential[[id number]]        Abnormal            Final result                 Please view results for these tests on the individual orders.   EXTRA TUBES    Narrative:     The following orders were  created for panel order Extra Tubes.  Procedure                               Abnormality         Status                     ---------                               -----------         ------                     Light Blue Top[[id number]]                                   Final result               Gold Top - SST[[id number]]                                   Final result                 Please view results for these tests on the individual orders.   LIGHT BLUE TOP   GOLD TOP - SST       No orders to display                 MDM    Final diagnoses:   Vaginal bleeding            Akbar Piña MD  01/20/18 0311

## 2018-02-26 ENCOUNTER — OFFICE VISIT (OUTPATIENT)
Dept: OBSTETRICS AND GYNECOLOGY | Facility: CLINIC | Age: 23
End: 2018-02-26

## 2018-02-26 VITALS
SYSTOLIC BLOOD PRESSURE: 131 MMHG | BODY MASS INDEX: 30.45 KG/M2 | WEIGHT: 194 LBS | DIASTOLIC BLOOD PRESSURE: 78 MMHG | HEIGHT: 67 IN

## 2018-02-26 DIAGNOSIS — Z30.41 ENCOUNTER FOR SURVEILLANCE OF CONTRACEPTIVE PILLS: Primary | ICD-10-CM

## 2018-02-26 PROCEDURE — 99212 OFFICE O/P EST SF 10 MIN: CPT | Performed by: OBSTETRICS & GYNECOLOGY

## 2018-02-26 RX ORDER — NORGESTIMATE AND ETHINYL ESTRADIOL 0.25-0.035
1 KIT ORAL DAILY
Qty: 84 TABLET | Refills: 3 | Status: SHIPPED | OUTPATIENT
Start: 2018-02-26 | End: 2018-03-26

## 2018-02-26 NOTE — PROGRESS NOTES
"Subjective     Chief Complaint   Patient presents with   • Follow-up       Vidhi Peña is a 22 y.o.  presents today for follow up.  Patient was seen in November of last year with the desires to restart on Seasonique.  She restarted the pills in December and states that December and  were fine, however, this month she has had a lot of brown spotting and some cramping.  Would like a different pill because she doesn't like the constant spotting.  States she takes her pills at the same time every day.  The bleeding isn't heavy, just annoying.  Stopped the metformin due to GI side effects, doesn't want to restart.     No changes to PMH, PSH, family or social history since last visit.  No new medications or allergies.     Objective      /78  Ht 170.2 cm (67.01\")  Wt 88 kg (194 lb)  BMI 30.38 kg/m2    Physical Exam  General:   Appears stated age, AAOx3, NAD   Neurologic: CN II - XII grossly intact       Assessment/Plan     ASSESSMENT  1. Encounter for surveillance of contraceptive pills        PLAN  1. OCPs  - Discussed that this bleeding pattern with BTB is very common with continuous use of OCPs, patient states she understand but would still like to try a different pill  - Offered other forms of contraceptives to help with BTB, declines, wants the pills given desires to start trying after wedding  - Prescription called in for Sprintec, higher dose of hormones  - RTC in 3 months for follow up    New Medications Ordered This Visit   Medications   • norgestimate-ethinyl estradiol (SPRINTEC 28) 0.25-35 MG-MCG per tablet     Sig: Take 1 tablet by mouth Daily for 28 doses.     Dispense:  84 tablet     Refill:  3       Qing Brown MD  2018           "

## 2018-04-25 PROCEDURE — 87147 CULTURE TYPE IMMUNOLOGIC: CPT | Performed by: FAMILY MEDICINE

## 2018-04-25 PROCEDURE — 87081 CULTURE SCREEN ONLY: CPT | Performed by: FAMILY MEDICINE

## 2018-04-26 ENCOUNTER — APPOINTMENT (OUTPATIENT)
Dept: CT IMAGING | Facility: HOSPITAL | Age: 23
End: 2018-04-26

## 2018-04-26 ENCOUNTER — HOSPITAL ENCOUNTER (OUTPATIENT)
Facility: HOSPITAL | Age: 23
Setting detail: OBSERVATION
Discharge: HOME | End: 2018-04-28
Attending: FAMILY MEDICINE | Admitting: FAMILY MEDICINE

## 2018-04-26 DIAGNOSIS — J36 TONSILLAR ABSCESS: Primary | ICD-10-CM

## 2018-04-26 LAB
ALBUMIN SERPL-MCNC: 4.7 G/DL (ref 3.4–4.8)
ALBUMIN/GLOB SERPL: 1.1 G/DL (ref 1.1–1.8)
ALP SERPL-CCNC: 94 U/L (ref 38–126)
ALT SERPL W P-5'-P-CCNC: 32 U/L (ref 9–52)
ANION GAP SERPL CALCULATED.3IONS-SCNC: 17 MMOL/L (ref 5–15)
AST SERPL-CCNC: 25 U/L (ref 14–36)
B-HCG UR QL: NEGATIVE
BACTERIA UR QL AUTO: ABNORMAL /HPF
BASOPHILS # BLD AUTO: 0.02 10*3/MM3 (ref 0–0.2)
BASOPHILS NFR BLD AUTO: 0.1 % (ref 0–2)
BILIRUB SERPL-MCNC: 0.7 MG/DL (ref 0.2–1.3)
BILIRUB UR QL STRIP: ABNORMAL
BUN BLD-MCNC: 11 MG/DL (ref 7–21)
BUN/CREAT SERPL: 10.9 (ref 7–25)
CALCIUM SPEC-SCNC: 10.4 MG/DL (ref 8.4–10.2)
CHLORIDE SERPL-SCNC: 98 MMOL/L (ref 95–110)
CLARITY UR: CLEAR
CO2 SERPL-SCNC: 26 MMOL/L (ref 22–31)
COLOR UR: YELLOW
CREAT BLD-MCNC: 1.01 MG/DL (ref 0.5–1)
D-LACTATE SERPL-SCNC: 1.1 MMOL/L (ref 0.5–2)
DEPRECATED RDW RBC AUTO: 38.3 FL (ref 36.4–46.3)
EOSINOPHIL # BLD AUTO: 0.04 10*3/MM3 (ref 0–0.7)
EOSINOPHIL NFR BLD AUTO: 0.3 % (ref 0–7)
ERYTHROCYTE [DISTWIDTH] IN BLOOD BY AUTOMATED COUNT: 12.7 % (ref 11.5–14.5)
GFR SERPL CREATININE-BSD FRML MDRD: 67 ML/MIN/1.73 (ref 60–165)
GLOBULIN UR ELPH-MCNC: 4.1 GM/DL (ref 2.3–3.5)
GLUCOSE BLD-MCNC: 106 MG/DL (ref 60–100)
GLUCOSE UR STRIP-MCNC: NEGATIVE MG/DL
HCT VFR BLD AUTO: 42.4 % (ref 35–45)
HETEROPH AB SER QL LA: NEGATIVE
HGB BLD-MCNC: 15.1 G/DL (ref 12–15.5)
HGB UR QL STRIP.AUTO: ABNORMAL
HYALINE CASTS UR QL AUTO: ABNORMAL /LPF
IMM GRANULOCYTES # BLD: 0.05 10*3/MM3 (ref 0–0.02)
IMM GRANULOCYTES NFR BLD: 0.3 % (ref 0–0.5)
KETONES UR QL STRIP: ABNORMAL
LEUKOCYTE ESTERASE UR QL STRIP.AUTO: NEGATIVE
LYMPHOCYTES # BLD AUTO: 2.24 10*3/MM3 (ref 0.6–4.2)
LYMPHOCYTES NFR BLD AUTO: 15.1 % (ref 10–50)
MCH RBC QN AUTO: 29.3 PG (ref 26.5–34)
MCHC RBC AUTO-ENTMCNC: 35.6 G/DL (ref 31.4–36)
MCV RBC AUTO: 82.3 FL (ref 80–98)
MONOCYTES # BLD AUTO: 1.24 10*3/MM3 (ref 0–0.9)
MONOCYTES NFR BLD AUTO: 8.4 % (ref 0–12)
NEUTROPHILS # BLD AUTO: 11.25 10*3/MM3 (ref 2–8.6)
NEUTROPHILS NFR BLD AUTO: 75.8 % (ref 37–80)
NITRITE UR QL STRIP: POSITIVE
PH UR STRIP.AUTO: 5.5 [PH] (ref 5–9)
PLATELET # BLD AUTO: 201 10*3/MM3 (ref 150–450)
PMV BLD AUTO: 10.1 FL (ref 8–12)
POTASSIUM BLD-SCNC: 3.7 MMOL/L (ref 3.5–5.1)
PROT SERPL-MCNC: 8.8 G/DL (ref 6.3–8.6)
PROT UR QL STRIP: ABNORMAL
RBC # BLD AUTO: 5.15 10*6/MM3 (ref 3.77–5.16)
RBC # UR: ABNORMAL /HPF
REF LAB TEST METHOD: ABNORMAL
S PYO AG THROAT QL: NEGATIVE
SODIUM BLD-SCNC: 141 MMOL/L (ref 137–145)
SP GR UR STRIP: 1.02 (ref 1–1.03)
SQUAMOUS #/AREA URNS HPF: ABNORMAL /HPF
UROBILINOGEN UR QL STRIP: ABNORMAL
WBC NRBC COR # BLD: 14.84 10*3/MM3 (ref 3.2–9.8)
WBC UR QL AUTO: ABNORMAL /HPF

## 2018-04-26 PROCEDURE — G0378 HOSPITAL OBSERVATION PER HR: HCPCS

## 2018-04-26 PROCEDURE — 25010000002 MORPHINE PER 10 MG: Performed by: EMERGENCY MEDICINE

## 2018-04-26 PROCEDURE — 96367 TX/PROPH/DG ADDL SEQ IV INF: CPT

## 2018-04-26 PROCEDURE — 81001 URINALYSIS AUTO W/SCOPE: CPT | Performed by: PHYSICIAN ASSISTANT

## 2018-04-26 PROCEDURE — 96361 HYDRATE IV INFUSION ADD-ON: CPT

## 2018-04-26 PROCEDURE — 99242 OFF/OP CONSLTJ NEW/EST SF 20: CPT | Performed by: OTOLARYNGOLOGY

## 2018-04-26 PROCEDURE — 96376 TX/PRO/DX INJ SAME DRUG ADON: CPT

## 2018-04-26 PROCEDURE — 99219 PR INITIAL OBSERVATION CARE/DAY 50 MINUTES: CPT | Performed by: FAMILY MEDICINE

## 2018-04-26 PROCEDURE — 25010000002 IOPAMIDOL 61 % SOLUTION: Performed by: PHYSICIAN ASSISTANT

## 2018-04-26 PROCEDURE — 25010000002 MORPHINE PER 10 MG: Performed by: FAMILY MEDICINE

## 2018-04-26 PROCEDURE — 87040 BLOOD CULTURE FOR BACTERIA: CPT | Performed by: PHYSICIAN ASSISTANT

## 2018-04-26 PROCEDURE — 80053 COMPREHEN METABOLIC PANEL: CPT | Performed by: PHYSICIAN ASSISTANT

## 2018-04-26 PROCEDURE — 25010000002 MORPHINE SULFATE (PF) 2 MG/ML SOLUTION: Performed by: EMERGENCY MEDICINE

## 2018-04-26 PROCEDURE — 87081 CULTURE SCREEN ONLY: CPT | Performed by: PHYSICIAN ASSISTANT

## 2018-04-26 PROCEDURE — 25010000002 DEXAMETHASONE PER 1 MG: Performed by: PHYSICIAN ASSISTANT

## 2018-04-26 PROCEDURE — 86308 HETEROPHILE ANTIBODY SCREEN: CPT | Performed by: PHYSICIAN ASSISTANT

## 2018-04-26 PROCEDURE — 81025 URINE PREGNANCY TEST: CPT | Performed by: PHYSICIAN ASSISTANT

## 2018-04-26 PROCEDURE — 87880 STREP A ASSAY W/OPTIC: CPT | Performed by: PHYSICIAN ASSISTANT

## 2018-04-26 PROCEDURE — 70491 CT SOFT TISSUE NECK W/DYE: CPT

## 2018-04-26 PROCEDURE — 83605 ASSAY OF LACTIC ACID: CPT | Performed by: PHYSICIAN ASSISTANT

## 2018-04-26 PROCEDURE — 99284 EMERGENCY DEPT VISIT MOD MDM: CPT

## 2018-04-26 PROCEDURE — 96365 THER/PROPH/DIAG IV INF INIT: CPT

## 2018-04-26 PROCEDURE — 85025 COMPLETE CBC W/AUTO DIFF WBC: CPT | Performed by: PHYSICIAN ASSISTANT

## 2018-04-26 RX ORDER — SODIUM CHLORIDE 9 MG/ML
100 INJECTION, SOLUTION INTRAVENOUS CONTINUOUS
Status: DISCONTINUED | OUTPATIENT
Start: 2018-04-26 | End: 2018-04-27

## 2018-04-26 RX ORDER — NALOXONE HCL 0.4 MG/ML
0.4 VIAL (ML) INJECTION
Status: DISCONTINUED | OUTPATIENT
Start: 2018-04-26 | End: 2018-04-27

## 2018-04-26 RX ORDER — SODIUM CHLORIDE 0.9 % (FLUSH) 0.9 %
10 SYRINGE (ML) INJECTION AS NEEDED
Status: DISCONTINUED | OUTPATIENT
Start: 2018-04-26 | End: 2018-04-28 | Stop reason: HOSPADM

## 2018-04-26 RX ORDER — MORPHINE SULFATE 2 MG/ML
1 INJECTION, SOLUTION INTRAMUSCULAR; INTRAVENOUS EVERY 4 HOURS PRN
Status: DISCONTINUED | OUTPATIENT
Start: 2018-04-26 | End: 2018-04-27

## 2018-04-26 RX ORDER — SODIUM CHLORIDE 0.9 % (FLUSH) 0.9 %
1-10 SYRINGE (ML) INJECTION AS NEEDED
Status: DISCONTINUED | OUTPATIENT
Start: 2018-04-26 | End: 2018-04-28 | Stop reason: HOSPADM

## 2018-04-26 RX ORDER — CLINDAMYCIN PHOSPHATE 150 MG/ML
600 INJECTION, SOLUTION INTRAVENOUS EVERY 8 HOURS SCHEDULED
Status: DISCONTINUED | OUTPATIENT
Start: 2018-04-27 | End: 2018-04-27

## 2018-04-26 RX ORDER — MORPHINE SULFATE 2 MG/ML
2 INJECTION, SOLUTION INTRAMUSCULAR; INTRAVENOUS ONCE
Status: COMPLETED | OUTPATIENT
Start: 2018-04-26 | End: 2018-04-26

## 2018-04-26 RX ORDER — ONDANSETRON 2 MG/ML
4 INJECTION INTRAMUSCULAR; INTRAVENOUS EVERY 6 HOURS PRN
Status: DISCONTINUED | OUTPATIENT
Start: 2018-04-26 | End: 2018-04-28 | Stop reason: HOSPADM

## 2018-04-26 RX ORDER — DEXAMETHASONE SODIUM PHOSPHATE 10 MG/ML
10 INJECTION INTRAMUSCULAR; INTRAVENOUS ONCE
Status: COMPLETED | OUTPATIENT
Start: 2018-04-26 | End: 2018-04-26

## 2018-04-26 RX ORDER — CLINDAMYCIN PHOSPHATE 900 MG/50ML
900 INJECTION INTRAVENOUS ONCE
Status: COMPLETED | OUTPATIENT
Start: 2018-04-26 | End: 2018-04-26

## 2018-04-26 RX ADMIN — SODIUM CHLORIDE 1000 ML: 900 INJECTION, SOLUTION INTRAVENOUS at 20:14

## 2018-04-26 RX ADMIN — CEFAZOLIN 1 G: 1 INJECTION, POWDER, FOR SOLUTION INTRAMUSCULAR; INTRAVENOUS; PARENTERAL at 18:39

## 2018-04-26 RX ADMIN — MORPHINE SULFATE 1 MG: 2 INJECTION, SOLUTION INTRAMUSCULAR; INTRAVENOUS at 22:33

## 2018-04-26 RX ADMIN — SODIUM CHLORIDE 1000 ML: 900 INJECTION, SOLUTION INTRAVENOUS at 17:58

## 2018-04-26 RX ADMIN — CLINDAMYCIN IN 5 PERCENT DEXTROSE 900 MG: 18 INJECTION, SOLUTION INTRAVENOUS at 20:14

## 2018-04-26 RX ADMIN — SODIUM CHLORIDE 100 ML/HR: 9 INJECTION, SOLUTION INTRAVENOUS at 22:33

## 2018-04-26 RX ADMIN — IOPAMIDOL 93 ML: 612 INJECTION, SOLUTION INTRAVENOUS at 18:54

## 2018-04-26 RX ADMIN — MORPHINE SULFATE 2 MG: 2 INJECTION, SOLUTION INTRAMUSCULAR; INTRAVENOUS at 19:57

## 2018-04-26 RX ADMIN — MORPHINE SULFATE 2 MG: 2 INJECTION, SOLUTION INTRAMUSCULAR; INTRAVENOUS at 18:39

## 2018-04-26 RX ADMIN — DEXAMETHASONE SODIUM PHOSPHATE 10 MG: 10 INJECTION, SOLUTION INTRAMUSCULAR; INTRAVENOUS at 19:21

## 2018-04-27 LAB
ANION GAP SERPL CALCULATED.3IONS-SCNC: 15 MMOL/L (ref 5–15)
BASOPHILS # BLD AUTO: 0.01 10*3/MM3 (ref 0–0.2)
BASOPHILS NFR BLD AUTO: 0.1 % (ref 0–2)
BUN BLD-MCNC: 8 MG/DL (ref 7–21)
BUN/CREAT SERPL: 13.3 (ref 7–25)
CALCIUM SPEC-SCNC: 9.4 MG/DL (ref 8.4–10.2)
CHLORIDE SERPL-SCNC: 104 MMOL/L (ref 95–110)
CO2 SERPL-SCNC: 21 MMOL/L (ref 22–31)
CREAT BLD-MCNC: 0.6 MG/DL (ref 0.5–1)
DEPRECATED RDW RBC AUTO: 37.7 FL (ref 36.4–46.3)
EOSINOPHIL # BLD AUTO: 0.01 10*3/MM3 (ref 0–0.7)
EOSINOPHIL NFR BLD AUTO: 0.1 % (ref 0–7)
ERYTHROCYTE [DISTWIDTH] IN BLOOD BY AUTOMATED COUNT: 12.5 % (ref 11.5–14.5)
GFR SERPL CREATININE-BSD FRML MDRD: 122 ML/MIN/1.73 (ref 71–165)
GLUCOSE BLD-MCNC: 144 MG/DL (ref 60–100)
HCT VFR BLD AUTO: 38.5 % (ref 35–45)
HGB BLD-MCNC: 13.6 G/DL (ref 12–15.5)
IMM GRANULOCYTES # BLD: 0.05 10*3/MM3 (ref 0–0.02)
IMM GRANULOCYTES NFR BLD: 0.3 % (ref 0–0.5)
LYMPHOCYTES # BLD AUTO: 1.52 10*3/MM3 (ref 0.6–4.2)
LYMPHOCYTES NFR BLD AUTO: 9.8 % (ref 10–50)
MCH RBC QN AUTO: 29.1 PG (ref 26.5–34)
MCHC RBC AUTO-ENTMCNC: 35.3 G/DL (ref 31.4–36)
MCV RBC AUTO: 82.4 FL (ref 80–98)
MONOCYTES # BLD AUTO: 0.46 10*3/MM3 (ref 0–0.9)
MONOCYTES NFR BLD AUTO: 3 % (ref 0–12)
NEUTROPHILS # BLD AUTO: 13.49 10*3/MM3 (ref 2–8.6)
NEUTROPHILS NFR BLD AUTO: 86.7 % (ref 37–80)
PLATELET # BLD AUTO: 216 10*3/MM3 (ref 150–450)
PMV BLD AUTO: 10.3 FL (ref 8–12)
POTASSIUM BLD-SCNC: 4.4 MMOL/L (ref 3.5–5.1)
RBC # BLD AUTO: 4.67 10*6/MM3 (ref 3.77–5.16)
SODIUM BLD-SCNC: 140 MMOL/L (ref 137–145)
WBC NRBC COR # BLD: 15.54 10*3/MM3 (ref 3.2–9.8)

## 2018-04-27 PROCEDURE — 94799 UNLISTED PULMONARY SVC/PX: CPT

## 2018-04-27 PROCEDURE — 96366 THER/PROPH/DIAG IV INF ADDON: CPT

## 2018-04-27 PROCEDURE — 96361 HYDRATE IV INFUSION ADD-ON: CPT

## 2018-04-27 PROCEDURE — 99224 PR SBSQ OBSERVATION CARE/DAY 15 MINUTES: CPT | Performed by: OTOLARYNGOLOGY

## 2018-04-27 PROCEDURE — 63710000001 PREDNISONE PER 1 MG: Performed by: FAMILY MEDICINE

## 2018-04-27 PROCEDURE — 85025 COMPLETE CBC W/AUTO DIFF WBC: CPT | Performed by: FAMILY MEDICINE

## 2018-04-27 PROCEDURE — G0378 HOSPITAL OBSERVATION PER HR: HCPCS

## 2018-04-27 PROCEDURE — 80048 BASIC METABOLIC PNL TOTAL CA: CPT | Performed by: FAMILY MEDICINE

## 2018-04-27 PROCEDURE — 96376 TX/PRO/DX INJ SAME DRUG ADON: CPT

## 2018-04-27 PROCEDURE — 25010000002 MORPHINE PER 10 MG: Performed by: FAMILY MEDICINE

## 2018-04-27 RX ORDER — PREDNISONE 20 MG/1
40 TABLET ORAL DAILY
Status: DISCONTINUED | OUTPATIENT
Start: 2018-04-27 | End: 2018-04-28 | Stop reason: HOSPADM

## 2018-04-27 RX ORDER — CLINDAMYCIN PHOSPHATE 600 MG/50ML
600 INJECTION INTRAVENOUS EVERY 8 HOURS
Status: DISCONTINUED | OUTPATIENT
Start: 2018-04-27 | End: 2018-04-27

## 2018-04-27 RX ORDER — CLINDAMYCIN HYDROCHLORIDE 150 MG/1
600 CAPSULE ORAL EVERY 8 HOURS
Status: DISCONTINUED | OUTPATIENT
Start: 2018-04-27 | End: 2018-04-27

## 2018-04-27 RX ORDER — OXYCODONE HYDROCHLORIDE AND ACETAMINOPHEN 5; 325 MG/1; MG/1
1 TABLET ORAL EVERY 4 HOURS PRN
Status: DISCONTINUED | OUTPATIENT
Start: 2018-04-27 | End: 2018-04-28 | Stop reason: HOSPADM

## 2018-04-27 RX ORDER — ACETAMINOPHEN 325 MG/1
650 TABLET ORAL EVERY 6 HOURS PRN
Status: DISCONTINUED | OUTPATIENT
Start: 2018-04-27 | End: 2018-04-28 | Stop reason: HOSPADM

## 2018-04-27 RX ORDER — CLINDAMYCIN PHOSPHATE 150 MG/ML
600 INJECTION, SOLUTION INTRAVENOUS EVERY 8 HOURS SCHEDULED
Status: DISCONTINUED | OUTPATIENT
Start: 2018-04-27 | End: 2018-04-27 | Stop reason: CLARIF

## 2018-04-27 RX ORDER — AMOXICILLIN AND CLAVULANATE POTASSIUM 875; 125 MG/1; MG/1
1 TABLET, FILM COATED ORAL EVERY 8 HOURS
Status: DISCONTINUED | OUTPATIENT
Start: 2018-04-27 | End: 2018-04-28 | Stop reason: HOSPADM

## 2018-04-27 RX ADMIN — MORPHINE SULFATE 1 MG: 2 INJECTION, SOLUTION INTRAMUSCULAR; INTRAVENOUS at 07:34

## 2018-04-27 RX ADMIN — AMOXICILLIN AND CLAVULANATE POTASSIUM 1 TABLET: 875; 125 TABLET, FILM COATED ORAL at 12:39

## 2018-04-27 RX ADMIN — PREDNISONE 40 MG: 20 TABLET ORAL at 15:47

## 2018-04-27 RX ADMIN — AMOXICILLIN AND CLAVULANATE POTASSIUM 1 TABLET: 875; 125 TABLET, FILM COATED ORAL at 20:42

## 2018-04-27 RX ADMIN — MORPHINE SULFATE 1 MG: 2 INJECTION, SOLUTION INTRAMUSCULAR; INTRAVENOUS at 02:28

## 2018-04-27 RX ADMIN — CLINDAMYCIN IN 5 PERCENT DEXTROSE 600 MG: 12 INJECTION, SOLUTION INTRAVENOUS at 04:10

## 2018-04-27 RX ADMIN — OXYCODONE HYDROCHLORIDE AND ACETAMINOPHEN 1 TABLET: 5; 325 TABLET ORAL at 12:39

## 2018-04-27 RX ADMIN — OXYCODONE HYDROCHLORIDE AND ACETAMINOPHEN 1 TABLET: 5; 325 TABLET ORAL at 20:42

## 2018-04-28 VITALS
WEIGHT: 194.2 LBS | DIASTOLIC BLOOD PRESSURE: 64 MMHG | SYSTOLIC BLOOD PRESSURE: 110 MMHG | BODY MASS INDEX: 30.48 KG/M2 | HEART RATE: 77 BPM | HEIGHT: 67 IN | OXYGEN SATURATION: 96 % | RESPIRATION RATE: 18 BRPM | TEMPERATURE: 97.6 F

## 2018-04-28 LAB
ANION GAP SERPL CALCULATED.3IONS-SCNC: 15 MMOL/L (ref 5–15)
BACTERIA SPEC AEROBE CULT: NORMAL
BASOPHILS # BLD AUTO: 0.02 10*3/MM3 (ref 0–0.2)
BASOPHILS NFR BLD AUTO: 0.1 % (ref 0–2)
BUN BLD-MCNC: 10 MG/DL (ref 7–21)
BUN/CREAT SERPL: 16.4 (ref 7–25)
CALCIUM SPEC-SCNC: 10 MG/DL (ref 8.4–10.2)
CHLORIDE SERPL-SCNC: 102 MMOL/L (ref 95–110)
CO2 SERPL-SCNC: 25 MMOL/L (ref 22–31)
CREAT BLD-MCNC: 0.61 MG/DL (ref 0.5–1)
DEPRECATED RDW RBC AUTO: 38 FL (ref 36.4–46.3)
EOSINOPHIL # BLD AUTO: 0.05 10*3/MM3 (ref 0–0.7)
EOSINOPHIL NFR BLD AUTO: 0.4 % (ref 0–7)
ERYTHROCYTE [DISTWIDTH] IN BLOOD BY AUTOMATED COUNT: 12.7 % (ref 11.5–14.5)
GFR SERPL CREATININE-BSD FRML MDRD: 120 ML/MIN/1.73 (ref 60–165)
GLUCOSE BLD-MCNC: 102 MG/DL (ref 60–100)
HCT VFR BLD AUTO: 38.3 % (ref 35–45)
HGB BLD-MCNC: 13.7 G/DL (ref 12–15.5)
IMM GRANULOCYTES # BLD: 0.06 10*3/MM3 (ref 0–0.02)
IMM GRANULOCYTES NFR BLD: 0.4 % (ref 0–0.5)
LYMPHOCYTES # BLD AUTO: 3.07 10*3/MM3 (ref 0.6–4.2)
LYMPHOCYTES NFR BLD AUTO: 22.3 % (ref 10–50)
MCH RBC QN AUTO: 29.5 PG (ref 26.5–34)
MCHC RBC AUTO-ENTMCNC: 35.8 G/DL (ref 31.4–36)
MCV RBC AUTO: 82.4 FL (ref 80–98)
MONOCYTES # BLD AUTO: 0.86 10*3/MM3 (ref 0–0.9)
MONOCYTES NFR BLD AUTO: 6.3 % (ref 0–12)
NEUTROPHILS # BLD AUTO: 9.68 10*3/MM3 (ref 2–8.6)
NEUTROPHILS NFR BLD AUTO: 70.5 % (ref 37–80)
PLATELET # BLD AUTO: 242 10*3/MM3 (ref 150–450)
PMV BLD AUTO: 9.8 FL (ref 8–12)
POTASSIUM BLD-SCNC: 4.3 MMOL/L (ref 3.5–5.1)
RBC # BLD AUTO: 4.65 10*6/MM3 (ref 3.77–5.16)
SODIUM BLD-SCNC: 142 MMOL/L (ref 137–145)
WBC NRBC COR # BLD: 13.74 10*3/MM3 (ref 3.2–9.8)

## 2018-04-28 PROCEDURE — G0378 HOSPITAL OBSERVATION PER HR: HCPCS

## 2018-04-28 PROCEDURE — 85025 COMPLETE CBC W/AUTO DIFF WBC: CPT | Performed by: FAMILY MEDICINE

## 2018-04-28 PROCEDURE — 99217 PR OBSERVATION CARE DISCHARGE MANAGEMENT: CPT | Performed by: FAMILY MEDICINE

## 2018-04-28 PROCEDURE — 99224 PR SBSQ OBSERVATION CARE/DAY 15 MINUTES: CPT | Performed by: OTOLARYNGOLOGY

## 2018-04-28 PROCEDURE — 80048 BASIC METABOLIC PNL TOTAL CA: CPT | Performed by: FAMILY MEDICINE

## 2018-04-28 RX ORDER — AMOXICILLIN AND CLAVULANATE POTASSIUM 875; 125 MG/1; MG/1
1 TABLET, FILM COATED ORAL EVERY 8 HOURS
Qty: 27 TABLET | Refills: 0 | Status: SHIPPED | OUTPATIENT
Start: 2018-04-28 | End: 2018-05-08

## 2018-04-28 RX ADMIN — AMOXICILLIN AND CLAVULANATE POTASSIUM 1 TABLET: 875; 125 TABLET, FILM COATED ORAL at 03:18

## 2018-04-28 RX ADMIN — OXYCODONE HYDROCHLORIDE AND ACETAMINOPHEN 1 TABLET: 5; 325 TABLET ORAL at 08:12

## 2018-04-30 ENCOUNTER — PREP FOR SURGERY (OUTPATIENT)
Dept: OTHER | Facility: HOSPITAL | Age: 23
End: 2018-04-30

## 2018-04-30 ENCOUNTER — TELEPHONE (OUTPATIENT)
Dept: FAMILY MEDICINE CLINIC | Facility: CLINIC | Age: 23
End: 2018-04-30

## 2018-04-30 DIAGNOSIS — J35.01 CHRONIC TONSILLITIS: Primary | ICD-10-CM

## 2018-05-01 LAB
BACTERIA SPEC AEROBE CULT: NORMAL
BACTERIA SPEC AEROBE CULT: NORMAL

## 2018-05-10 ENCOUNTER — ANESTHESIA EVENT (OUTPATIENT)
Dept: PERIOP | Facility: HOSPITAL | Age: 23
End: 2018-05-10

## 2018-05-10 ENCOUNTER — OFFICE VISIT (OUTPATIENT)
Dept: OTOLARYNGOLOGY | Facility: CLINIC | Age: 23
End: 2018-05-10

## 2018-05-10 VITALS — HEIGHT: 67 IN | BODY MASS INDEX: 29.66 KG/M2 | WEIGHT: 189 LBS | TEMPERATURE: 96.6 F

## 2018-05-10 DIAGNOSIS — J35.01 CHRONIC TONSILLITIS: Primary | ICD-10-CM

## 2018-05-10 PROCEDURE — 99214 OFFICE O/P EST MOD 30 MIN: CPT | Performed by: OTOLARYNGOLOGY

## 2018-05-10 NOTE — PATIENT INSTRUCTIONS

## 2018-05-10 NOTE — H&P
Subjective      Vidhi Peña is a 23 y.o. female.   CC: Follow-up tonsillitis with  History of Present Illness      A she states she's continuing to drink well not have any breathing problems and her pain is decreasing.  She's not had any bleeding or other symptoms from her throat she is interested in going home  She has been afebrile  The following portions of the patient's history were reviewed and updated as appropriate: allergies, current medications, past family history, past medical history, past social history, past surgical history and problem list.        Vidhi Peña reports that she has never smoked. She has never used smokeless tobacco. She reports that she drinks alcohol. She reports that she does not use drugs.  Patient is not a tobacco user and has not been counseled for use of tobacco products            Family History   Problem Relation Age of Onset   • Hypertension Mother     • Hypertension Maternal Grandfather     • Hyperlipidemia Maternal Grandfather     • Heart disease Maternal Grandfather     • Breast cancer Paternal Grandmother 50   • Colon cancer Paternal Uncle 50   • Diabetes Paternal Aunt         unsure which type or age of onset            Current Facility-Administered Medications:   •  acetaminophen (TYLENOL) tablet 650 mg, 650 mg, Oral, Q6H PRN, Kristen Rae MD  •  amoxicillin-clavulanate (AUGMENTIN) 875-125 MG per tablet 1 tablet, 1 tablet, Oral, Q8H, Gustavo Montes MD, 1 tablet at 04/28/18 0318  •  ondansetron (ZOFRAN) injection 4 mg, 4 mg, Intravenous, Q6H PRN, Francis Navarrete MD  •  oxyCODONE-acetaminophen (PERCOCET) 5-325 MG per tablet 1 tablet, 1 tablet, Oral, Q4H PRN, Kristen Rae MD, 1 tablet at 04/28/18 0812  •  predniSONE (DELTASONE) tablet 40 mg, 40 mg, Oral, Daily, Kristen Rae MD, 40 mg at 04/27/18 1547  •  sodium chloride 0.9 % flush 1-10 mL, 1-10 mL, Intravenous, PRN, Francis Navarrete MD  •   Insert peripheral IV, , , Once **AND** sodium chloride 0.9 % flush 10 mL, 10 mL, Intravenous, PRN, DAVID Mckeon          Allergies   Allergen Reactions   • Hydrocodone Nausea And Vomiting   • Dilaudid [Hydromorphone] Irritability   • Adhesive Tape Hives         Medical History        Past Medical History:   Diagnosis Date   • Miscarriage                 Review of Systems   Constitutional: Negative for fever.   HENT: Positive for sore throat.    Respiratory: Negative for apnea and shortness of breath.    Hematological: Positive for adenopathy.                  Objective      Physical Exam   Constitutional: She appears well-developed and well-nourished.   HENT:   Right Ear: Hearing, tympanic membrane, external ear and ear canal normal.   Left Ear: Hearing, tympanic membrane, external ear and ear canal normal.   Nose: Mucosal edema present.   Mouth/Throat: Oropharynx is clear and moist and mucous membranes are normal. Tonsils are 3+ on the right. Tonsils are 3+ on the left. Tonsillar exudate.       Eyes: Conjunctivae are normal.   Neck: Normal range of motion. No JVD present. No tracheal deviation present. No thyromegaly present.   Cardiovascular: Normal rate.    Pulmonary/Chest: Effort normal.   Musculoskeletal: Normal range of motion.   Neurological: She is alert.   Skin: Skin is warm.   Psychiatric: She has a normal mood and affect. Her behavior is normal. Thought content normal.      White count still elevated but the patient is on prednisone              Assessment/Plan      Improved tonsillar swelling with no clinical evidence of abscess there was no evidence very small radiographic abscess  Symptoms are markedly improved she's taking by mouth well she will follow up in 2-3 weeks for reassessment long-term plan.  She seems to be responding well to by mouth antibiotics  She should continue those for total 10 more days         Because the persistence the patient's symptoms frequency in the past she like to  undergo tonsillectomy.  We discussed the risks benefits severe morbidity and complications this procedure especially in adult.  She prefers to take a surgical approach because the frequent infections AND drainage regarding the procedure and its risk and benefitsOffered to perform tonsillectomy with adenoidectomy if significant adenoidal hypertrophy is present. Explained the nature of the procedure to the { patient and familyin laymans terms including the need for general anesthetic and risks of bleeding, voice change and difficulty swallowing including spillage of food or fluid into the nose on swallowing. Explained that the bleeding could be severe, life threatening, or require transfusion or return to the operating room. Proposed benefits include improved breathing at night, avoidance of the complications of sleep apnea, decreased frequency of throat infections, avoidance of the complications of streptococcal infection. Alternative would be observation. Patient voices understanding and wishes to proceed. Surgery is scheduled.  Thank you for this consultation.             This document has been electronically signed by Severiano Marshall MD on April 28, 2018 8:49 AM           Revision History

## 2018-05-10 NOTE — PROGRESS NOTES
Subjective   Vidhi Peña is a 23 y.o. female.   CC follow-up chronic tonsillitis  History of Present Illness   Patient's recently discharged to the hospital with chronic tonsillitis and was to undergo tonsillectomy she's had several episodes per year's or years as been getting more severe is chronic low-grade sore throat is had multiple surgeries past withoutt any anesthesia or bleeding problems  Says her throat feeling better  She is otherwise generally healthy denies pregnancy  The following portions of the patient's history were reviewed and updated as appropriate: allergies, current medications, past family history, past medical history, past social history, past surgical history and problem list.      Vidhi Pñea reports that she has never smoked. She has never used smokeless tobacco. She reports that she drinks alcohol. She reports that she does not use drugs.  Patient is not a tobacco user and has not been counseled for use of tobacco products    Family History   Problem Relation Age of Onset   • Hypertension Mother    • Hypertension Maternal Grandfather    • Hyperlipidemia Maternal Grandfather    • Heart disease Maternal Grandfather    • Breast cancer Paternal Grandmother 50   • Colon cancer Paternal Uncle 50   • Diabetes Paternal Aunt      unsure which type or age of onset       No current outpatient prescriptions on file.    Allergies   Allergen Reactions   • Hydrocodone Nausea And Vomiting   • Dilaudid [Hydromorphone] Irritability   • Adhesive Tape Hives       Past Medical History:   Diagnosis Date   • Enlarged tonsils    • Miscarriage    • Sore throat, chronic          Review of Systems   Constitutional: Negative for fever.   HENT: Positive for sore throat and trouble swallowing. Negative for mouth sores.    Respiratory: Negative for apnea.    Hematological: Positive for adenopathy.           Objective   Physical Exam   Constitutional: She is oriented to person, place, and time.  She appears well-developed and well-nourished.   HENT:   Head: Normocephalic and atraumatic.   Right Ear: Hearing, tympanic membrane, external ear and ear canal normal.   Left Ear: Hearing, tympanic membrane, external ear and ear canal normal.   Nose: Nose normal. No mucosal edema, rhinorrhea, nasal deformity or septal deviation. No epistaxis. Right sinus exhibits no maxillary sinus tenderness and no frontal sinus tenderness. Left sinus exhibits no maxillary sinus tenderness and no frontal sinus tenderness.   Mouth/Throat: Uvula is midline, oropharynx is clear and moist and mucous membranes are normal. No trismus in the jaw. Normal dentition. No oropharyngeal exudate or posterior oropharyngeal edema. Tonsils are 2+ on the right. Tonsils are 2+ on the left.       Eyes: Conjunctivae are normal.   Neck: Normal range of motion. Neck supple. No JVD present. No tracheal deviation present. No thyromegaly present.   Cardiovascular: Normal rate and regular rhythm.    Pulmonary/Chest: Effort normal and breath sounds normal.   Musculoskeletal: Normal range of motion.   Lymphadenopathy:        Head (right side): No submental, no submandibular, no tonsillar, no preauricular, no posterior auricular and no occipital adenopathy present.        Head (left side): No submental, no submandibular, no tonsillar, no preauricular, no posterior auricular and no occipital adenopathy present.     She has no cervical adenopathy.        Right cervical: No superficial cervical, no deep cervical and no posterior cervical adenopathy present.       Left cervical: No superficial cervical, no deep cervical and no posterior cervical adenopathy present.   Neurological: She is alert and oriented to person, place, and time. No cranial nerve deficit.   Skin: Skin is warm.   Psychiatric: She has a normal mood and affect. Her speech is normal and behavior is normal. Thought content normal.   Nursing note and vitals reviewed.            Assessment/Plan    Vidhi was seen today for sore throat.    Diagnoses and all orders for this visit:    Chronic tonsillitis      U the pluses and minuses of tonsillectomy she still wants to go forward because the severity of her symptoms her having to be recently hospitalized that is been so severe she's feeling much better now was to go and she has no known contraindications surgery we discussed the severe morbidity and possible complications and limitations terms of activities work and risk of bleeding and secondary surgery needsOffered to perform tonsillectomy with adenoidectomy if significant adenoidal hypertrophy is present. Explained the nature of the procedure to the patient in laymans terms including the need for general anesthetic and risks of bleeding, voice change and difficulty swallowing including spillage of food or fluid into the nose on swallowing. Explained that the bleeding could be severe, life threatening, or require transfusion or return to the operating room. Proposed benefits include improved breathing at night, avoidance of the complications of sleep apnea, decreased frequency of throat infections, avoidance of the complications of streptococcal infection. Alternative would be observation. Patient/parent/guardian voices understanding and wishes to proceed. Surgery is scheduled.

## 2018-05-11 ENCOUNTER — ANESTHESIA (OUTPATIENT)
Dept: PERIOP | Facility: HOSPITAL | Age: 23
End: 2018-05-11

## 2018-05-11 ENCOUNTER — HOSPITAL ENCOUNTER (OUTPATIENT)
Facility: HOSPITAL | Age: 23
Setting detail: HOSPITAL OUTPATIENT SURGERY
Discharge: HOME | End: 2018-05-11
Attending: OTOLARYNGOLOGY | Admitting: OTOLARYNGOLOGY

## 2018-05-11 VITALS
HEART RATE: 90 BPM | RESPIRATION RATE: 18 BRPM | BODY MASS INDEX: 29.03 KG/M2 | SYSTOLIC BLOOD PRESSURE: 130 MMHG | OXYGEN SATURATION: 95 % | TEMPERATURE: 98 F | HEIGHT: 67 IN | DIASTOLIC BLOOD PRESSURE: 64 MMHG | WEIGHT: 184.97 LBS

## 2018-05-11 DIAGNOSIS — J35.01 CHRONIC TONSILLITIS: ICD-10-CM

## 2018-05-11 LAB — B-HCG UR QL: NEGATIVE

## 2018-05-11 PROCEDURE — 25010000002 FENTANYL CITRATE (PF) 100 MCG/2ML SOLUTION: Performed by: NURSE ANESTHETIST, CERTIFIED REGISTERED

## 2018-05-11 PROCEDURE — 81025 URINE PREGNANCY TEST: CPT | Performed by: ANESTHESIOLOGY

## 2018-05-11 PROCEDURE — 42826 REMOVAL OF TONSILS: CPT | Performed by: OTOLARYNGOLOGY

## 2018-05-11 PROCEDURE — 25010000002 DEXAMETHASONE PER 1 MG: Performed by: NURSE ANESTHETIST, CERTIFIED REGISTERED

## 2018-05-11 PROCEDURE — 25010000002 PROPOFOL 10 MG/ML EMULSION: Performed by: NURSE ANESTHETIST, CERTIFIED REGISTERED

## 2018-05-11 PROCEDURE — 88304 TISSUE EXAM BY PATHOLOGIST: CPT | Performed by: OTOLARYNGOLOGY

## 2018-05-11 PROCEDURE — 25010000002 SUCCINYLCHOLINE PER 20 MG: Performed by: NURSE ANESTHETIST, CERTIFIED REGISTERED

## 2018-05-11 PROCEDURE — 25010000002 MIDAZOLAM PER 1 MG: Performed by: NURSE ANESTHETIST, CERTIFIED REGISTERED

## 2018-05-11 PROCEDURE — 88304 TISSUE EXAM BY PATHOLOGIST: CPT | Performed by: PATHOLOGY

## 2018-05-11 PROCEDURE — 25010000002 MORPHINE PER 10 MG: Performed by: NURSE ANESTHETIST, CERTIFIED REGISTERED

## 2018-05-11 PROCEDURE — 25010000002 ONDANSETRON PER 1 MG: Performed by: NURSE ANESTHETIST, CERTIFIED REGISTERED

## 2018-05-11 PROCEDURE — 92511 NASOPHARYNGOSCOPY: CPT | Performed by: OTOLARYNGOLOGY

## 2018-05-11 PROCEDURE — 25010000002 NEOSTIGMINE 4 MG/4ML SOLUTION PREFILLED SYRINGE: Performed by: NURSE ANESTHETIST, CERTIFIED REGISTERED

## 2018-05-11 RX ORDER — ONDANSETRON 8 MG/1
8 TABLET, ORALLY DISINTEGRATING ORAL EVERY 8 HOURS PRN
Qty: 15 TABLET | Refills: 1 | Status: SHIPPED | OUTPATIENT
Start: 2018-05-11 | End: 2018-06-04

## 2018-05-11 RX ORDER — DIPHENHYDRAMINE HYDROCHLORIDE 50 MG/ML
12.5 INJECTION INTRAMUSCULAR; INTRAVENOUS
Status: DISCONTINUED | OUTPATIENT
Start: 2018-05-11 | End: 2018-05-11 | Stop reason: HOSPADM

## 2018-05-11 RX ORDER — OXYCODONE HCL 5 MG/5 ML
7.5 SOLUTION, ORAL ORAL EVERY 4 HOURS PRN
Qty: 473 ML | Refills: 0 | Status: SHIPPED | OUTPATIENT
Start: 2018-05-11 | End: 2018-06-04

## 2018-05-11 RX ORDER — MIDAZOLAM HYDROCHLORIDE 1 MG/ML
INJECTION INTRAMUSCULAR; INTRAVENOUS AS NEEDED
Status: DISCONTINUED | OUTPATIENT
Start: 2018-05-11 | End: 2018-05-11 | Stop reason: SURG

## 2018-05-11 RX ORDER — SODIUM CHLORIDE, SODIUM GLUCONATE, SODIUM ACETATE, POTASSIUM CHLORIDE, AND MAGNESIUM CHLORIDE 526; 502; 368; 37; 30 MG/100ML; MG/100ML; MG/100ML; MG/100ML; MG/100ML
1000 INJECTION, SOLUTION INTRAVENOUS CONTINUOUS
Status: DISCONTINUED | OUTPATIENT
Start: 2018-05-11 | End: 2018-05-11 | Stop reason: HOSPADM

## 2018-05-11 RX ORDER — NALOXONE HCL 0.4 MG/ML
0.2 VIAL (ML) INJECTION AS NEEDED
Status: DISCONTINUED | OUTPATIENT
Start: 2018-05-11 | End: 2018-05-11 | Stop reason: HOSPADM

## 2018-05-11 RX ORDER — SUCCINYLCHOLINE CHLORIDE 20 MG/ML
INJECTION INTRAMUSCULAR; INTRAVENOUS AS NEEDED
Status: DISCONTINUED | OUTPATIENT
Start: 2018-05-11 | End: 2018-05-11 | Stop reason: SURG

## 2018-05-11 RX ORDER — GLYCOPYRROLATE 0.2 MG/ML
INJECTION INTRAMUSCULAR; INTRAVENOUS AS NEEDED
Status: DISCONTINUED | OUTPATIENT
Start: 2018-05-11 | End: 2018-05-11 | Stop reason: SURG

## 2018-05-11 RX ORDER — OXYCODONE HCL 5 MG/5 ML
7.5 SOLUTION, ORAL ORAL EVERY 4 HOURS PRN
Status: DISCONTINUED | OUTPATIENT
Start: 2018-05-11 | End: 2018-05-11 | Stop reason: HOSPADM

## 2018-05-11 RX ORDER — SODIUM CHLORIDE 0.9 % (FLUSH) 0.9 %
1-10 SYRINGE (ML) INJECTION AS NEEDED
Status: DISCONTINUED | OUTPATIENT
Start: 2018-05-11 | End: 2018-05-11 | Stop reason: HOSPADM

## 2018-05-11 RX ORDER — MEPERIDINE HYDROCHLORIDE 50 MG/ML
12.5 INJECTION INTRAMUSCULAR; INTRAVENOUS; SUBCUTANEOUS
Status: DISCONTINUED | OUTPATIENT
Start: 2018-05-11 | End: 2018-05-11 | Stop reason: HOSPADM

## 2018-05-11 RX ORDER — ONDANSETRON 2 MG/ML
INJECTION INTRAMUSCULAR; INTRAVENOUS AS NEEDED
Status: DISCONTINUED | OUTPATIENT
Start: 2018-05-11 | End: 2018-05-11 | Stop reason: SURG

## 2018-05-11 RX ORDER — ACETAMINOPHEN 325 MG/1
650 TABLET ORAL ONCE AS NEEDED
Status: DISCONTINUED | OUTPATIENT
Start: 2018-05-11 | End: 2018-05-11 | Stop reason: HOSPADM

## 2018-05-11 RX ORDER — OXYMETAZOLINE HYDROCHLORIDE 0.05 G/100ML
SPRAY NASAL AS NEEDED
Status: DISCONTINUED | OUTPATIENT
Start: 2018-05-11 | End: 2018-05-11 | Stop reason: HOSPADM

## 2018-05-11 RX ORDER — FENTANYL CITRATE 50 UG/ML
INJECTION, SOLUTION INTRAMUSCULAR; INTRAVENOUS AS NEEDED
Status: DISCONTINUED | OUTPATIENT
Start: 2018-05-11 | End: 2018-05-11 | Stop reason: SURG

## 2018-05-11 RX ORDER — LABETALOL HYDROCHLORIDE 5 MG/ML
5 INJECTION, SOLUTION INTRAVENOUS
Status: DISCONTINUED | OUTPATIENT
Start: 2018-05-11 | End: 2018-05-11 | Stop reason: HOSPADM

## 2018-05-11 RX ORDER — ONDANSETRON 2 MG/ML
4 INJECTION INTRAMUSCULAR; INTRAVENOUS ONCE AS NEEDED
Status: DISCONTINUED | OUTPATIENT
Start: 2018-05-11 | End: 2018-05-11 | Stop reason: HOSPADM

## 2018-05-11 RX ORDER — NEOSTIGMINE METHYLSULFATE 4 MG/4 ML
SYRINGE (ML) INTRAVENOUS AS NEEDED
Status: DISCONTINUED | OUTPATIENT
Start: 2018-05-11 | End: 2018-05-11 | Stop reason: SURG

## 2018-05-11 RX ORDER — MORPHINE SULFATE 4 MG/ML
2 INJECTION, SOLUTION INTRAMUSCULAR; INTRAVENOUS
Status: DISCONTINUED | OUTPATIENT
Start: 2018-05-11 | End: 2018-05-11 | Stop reason: HOSPADM

## 2018-05-11 RX ORDER — EPHEDRINE SULFATE 50 MG/ML
5 INJECTION, SOLUTION INTRAVENOUS ONCE AS NEEDED
Status: DISCONTINUED | OUTPATIENT
Start: 2018-05-11 | End: 2018-05-11 | Stop reason: HOSPADM

## 2018-05-11 RX ORDER — DEXAMETHASONE SODIUM PHOSPHATE 4 MG/ML
INJECTION, SOLUTION INTRA-ARTICULAR; INTRALESIONAL; INTRAMUSCULAR; INTRAVENOUS; SOFT TISSUE AS NEEDED
Status: DISCONTINUED | OUTPATIENT
Start: 2018-05-11 | End: 2018-05-11 | Stop reason: SURG

## 2018-05-11 RX ORDER — ROCURONIUM BROMIDE 10 MG/ML
INJECTION, SOLUTION INTRAVENOUS AS NEEDED
Status: DISCONTINUED | OUTPATIENT
Start: 2018-05-11 | End: 2018-05-11 | Stop reason: SURG

## 2018-05-11 RX ORDER — FLUMAZENIL 0.1 MG/ML
0.2 INJECTION INTRAVENOUS AS NEEDED
Status: DISCONTINUED | OUTPATIENT
Start: 2018-05-11 | End: 2018-05-11 | Stop reason: HOSPADM

## 2018-05-11 RX ORDER — ACETAMINOPHEN 650 MG/1
650 SUPPOSITORY RECTAL ONCE AS NEEDED
Status: DISCONTINUED | OUTPATIENT
Start: 2018-05-11 | End: 2018-05-11 | Stop reason: HOSPADM

## 2018-05-11 RX ORDER — PROPOFOL 10 MG/ML
VIAL (ML) INTRAVENOUS AS NEEDED
Status: DISCONTINUED | OUTPATIENT
Start: 2018-05-11 | End: 2018-05-11 | Stop reason: SURG

## 2018-05-11 RX ORDER — LIDOCAINE HYDROCHLORIDE 20 MG/ML
INJECTION, SOLUTION INFILTRATION; PERINEURAL AS NEEDED
Status: DISCONTINUED | OUTPATIENT
Start: 2018-05-11 | End: 2018-05-11 | Stop reason: SURG

## 2018-05-11 RX ADMIN — Medication 2 MG: at 09:05

## 2018-05-11 RX ADMIN — FENTANYL CITRATE 100 MCG: 50 INJECTION, SOLUTION INTRAMUSCULAR; INTRAVENOUS at 08:46

## 2018-05-11 RX ADMIN — PROPOFOL 200 MG: 10 INJECTION, EMULSION INTRAVENOUS at 08:46

## 2018-05-11 RX ADMIN — GLYCOPYRROLATE 0.4 MG: 0.2 INJECTION, SOLUTION INTRAMUSCULAR; INTRAVENOUS at 09:05

## 2018-05-11 RX ADMIN — ROCURONIUM BROMIDE 15 MG: 10 INJECTION INTRAVENOUS at 08:55

## 2018-05-11 RX ADMIN — MORPHINE SULFATE 2 MG: 4 INJECTION, SOLUTION INTRAMUSCULAR; INTRAVENOUS at 09:26

## 2018-05-11 RX ADMIN — MEPERIDINE HYDROCHLORIDE 12.5 MG: 50 INJECTION INTRAMUSCULAR; INTRAVENOUS; SUBCUTANEOUS at 09:45

## 2018-05-11 RX ADMIN — MEPERIDINE HYDROCHLORIDE 12.5 MG: 50 INJECTION INTRAMUSCULAR; INTRAVENOUS; SUBCUTANEOUS at 09:56

## 2018-05-11 RX ADMIN — ROCURONIUM BROMIDE 5 MG: 10 INJECTION INTRAVENOUS at 08:46

## 2018-05-11 RX ADMIN — FENTANYL CITRATE 50 MCG: 50 INJECTION, SOLUTION INTRAMUSCULAR; INTRAVENOUS at 08:57

## 2018-05-11 RX ADMIN — MORPHINE SULFATE 2 MG: 4 INJECTION, SOLUTION INTRAMUSCULAR; INTRAVENOUS at 10:03

## 2018-05-11 RX ADMIN — ONDANSETRON 4 MG: 2 INJECTION INTRAMUSCULAR; INTRAVENOUS at 08:59

## 2018-05-11 RX ADMIN — MIDAZOLAM 2 MG: 1 INJECTION INTRAMUSCULAR; INTRAVENOUS at 08:29

## 2018-05-11 RX ADMIN — MEPERIDINE HYDROCHLORIDE 12.5 MG: 50 INJECTION INTRAMUSCULAR; INTRAVENOUS; SUBCUTANEOUS at 09:50

## 2018-05-11 RX ADMIN — LIDOCAINE HYDROCHLORIDE 60 MG: 20 INJECTION, SOLUTION INFILTRATION; PERINEURAL at 08:46

## 2018-05-11 RX ADMIN — SUCCINYLCHOLINE CHLORIDE 110 MG: 20 INJECTION, SOLUTION INTRAMUSCULAR; INTRAVENOUS at 08:46

## 2018-05-11 RX ADMIN — SODIUM CHLORIDE, SODIUM GLUCONATE, SODIUM ACETATE, POTASSIUM CHLORIDE, AND MAGNESIUM CHLORIDE 1000 ML: 526; 502; 368; 37; 30 INJECTION, SOLUTION INTRAVENOUS at 07:36

## 2018-05-11 RX ADMIN — DEXAMETHASONE SODIUM PHOSPHATE 4 MG: 4 INJECTION, SOLUTION INTRAMUSCULAR; INTRAVENOUS at 08:59

## 2018-05-11 RX ADMIN — MORPHINE SULFATE 2 MG: 4 INJECTION, SOLUTION INTRAMUSCULAR; INTRAVENOUS at 10:08

## 2018-05-11 NOTE — ANESTHESIA PREPROCEDURE EVALUATION
Anesthesia Evaluation     Patient summary reviewed and Nursing notes reviewed   NPO Solid Status: > 8 hours  NPO Liquid Status: > 8 hours           Airway   Mallampati: II  TM distance: >3 FB  Neck ROM: full  possible difficult intubation  Dental - normal exam     Pulmonary - negative pulmonary ROS and normal exam    breath sounds clear to auscultation  (-) not a smoker  Cardiovascular - negative cardio ROS and normal exam    Rhythm: regular  Rate: normal    (-) murmur      Neuro/Psych  (+) headaches (Migraine.),     GI/Hepatic/Renal/Endo    (+) obesity,       Musculoskeletal (-) negative ROS    Abdominal    Substance History - negative use     OB/GYN negative ob/gyn ROS     Comment: HCG negative 4/26/18.      Other - negative ROS                       Anesthesia Plan    ASA 2     general     intravenous induction   Anesthetic plan and risks discussed with patient and spouse/significant other.

## 2018-05-11 NOTE — ANESTHESIA POSTPROCEDURE EVALUATION
Patient: Vidhi Moody    Procedure Summary     Date:  05/11/18 Room / [facility] 08 [facility]    Anesthesia Start:  0840 Anesthesia Stop:  0920    Procedure:  TONSILLECTOMY AND NASOPHARYNGOSCOPY   (PLEASE CALL PAT,) (N/A Throat) Diagnosis:       Chronic tonsillitis      (Chronic tonsillitis [J35.01])    Surgeon:  Severiano Marshall MD Provider:  Riki Chase MD    Anesthesia Type:  general ASA Status:  2          Anesthesia Type: general  Last vitals  BP   125/85 (05/11/18 0725)   Temp   97.9 °F (36.6 °C) (05/11/18 0725)   Pulse   96 (05/11/18 0725)   Resp   18 (05/11/18 0725)     SpO2   100 % (05/11/18 0725)     Post Anesthesia Care and Evaluation    Patient location during evaluation: PACU  Patient participation: complete - patient participated  Level of consciousness: awake and awake and alert  Pain score: 3  Pain management: satisfactory to patient  Airway patency: patent  Anesthetic complications: No anesthetic complications  PONV Status: controlled  Cardiovascular status: acceptable and stable  Respiratory status: acceptable, room air, unassisted and spontaneous ventilation  Hydration status: acceptable

## 2018-05-11 NOTE — OP NOTE
OPERATIVE NOTE    Name:    Vidhi Moody  YOB: 1995  Date of surgery:   5/11/2018    Pre-op Diagnosis:   Chronic tonsillitis [J35.01]    Post-op Diagnosis:    Post-Op Diagnosis Codes:     * Chronic tonsillitis [J35.01]    Procedure:  Procedure(s):  TONSILLECTOMY AND NASOPHARYNGOSCOPY        Surgeon:  Severiano Marshall MD, AAOHNS    Anesthesia: General    Staff:   Circulator: Rohini Shaffer RN  Scrub Person: Moni Butler  Assistant: Sylvia Calderon    Estimated Blood Loss: 5 ml  Specimens:                ID Type Source Tests Collected by Time   A : TONSILS RIGHT TAGGED  Tissue Tonsils TISSUE PATHOLOGY EXAM Severiano Marshall MD 5/11/2018 0846         Drains:  none    Findings:  No adenoids, cryptic tonsil no residual abcess    Complications: None    IMPLANTS:   Nothing was implanted during the procedure    INDICATIONS:chronic tonsillitis  and recent peritonsillar abcess    PROCEDURE:  PROCEDURE: Patient taken operating room placed in supine position.  Gen. anesthesia was carried out.  Timeout was carried out.  With the patient in the Elizabet position Afrin was placed and nose.    A Red Rubber catheter placed in the nose and the soft palate was retracted with the patient with a Yang-Supa mouthgag rested on towels. The tongue was relaxed every 3-4 minutes.  A mirror was used to evaluate the adenoids, th e were no significant adenoids The pharynx was irrigated  And then reinspected for abnormality or bleeding, none was noted . Then attention was taken to the tonsils.    The tonsils were excised by extracapsular dissection with electrocautery setting of 20 .There  was no bleeding, no burns . The pharynx was  reinspected and all the hardware removed and accounted for.  The patient  Was taken to the recovery room in stable condition .    Instructions were given the family                          This document has been electronically signed by Severiano Marshall MD on May 11, 2018 9:10 AM

## 2018-05-11 NOTE — ANESTHESIA PROCEDURE NOTES
Airway  Urgency: elective    Airway not difficult    General Information and Staff    Patient location during procedure: OR  CRNA: DARLIN BOLTON    Indications and Patient Condition  Indications for airway management: airway protection    Preoxygenated: yes  MILS not maintained throughout  Mask difficulty assessment: 1 - vent by mask    Final Airway Details  Final airway type: endotracheal airway      Successful airway: ETT and DERRICK tube  Cuffed: yes   Successful intubation technique: direct laryngoscopy  Facilitating devices/methods: intubating stylet  Endotracheal tube insertion site: oral  Blade: Fabrizio  Blade size: #3  ETT size: 7.0 mm  Cormack-Lehane Classification: grade I - full view of glottis  Placement verified by: chest auscultation and capnometry   Cuff volume (mL): 7  Measured from: lips  ETT to lips (cm): 21  Number of attempts at approach: 1

## 2018-05-14 LAB
LAB AP CASE REPORT: NORMAL
Lab: NORMAL
PATH REPORT.FINAL DX SPEC: NORMAL
PATH REPORT.GROSS SPEC: NORMAL

## 2018-05-16 ENCOUNTER — TELEPHONE (OUTPATIENT)
Dept: FAMILY MEDICINE CLINIC | Facility: CLINIC | Age: 23
End: 2018-05-16

## 2018-05-16 NOTE — TELEPHONE ENCOUNTER
PATIENT CALLED AND ASKED IF YOU COULD GIVE HER A CALL; I ASKED HER WHAT IT IS IN REFERENCE TO  AND SHE SAID SHE WANTED TO SEE ABOUT GETTING SOME ANTIBIOTICS CALLED IN. I TOLD HER IF SHE THINKS SHE IS NEEDING ANTIBIOTICS FOR ANYTHING, SHE WOULD NEED TO BE SEEN TO GET THOSE BECAUSE WE DO NOT PRESCRIBE ANTIBIOTICS OVER THE PHONE. SHE SAID SHE KNEW THIS, BUT WANTED TO TALK TO YOU ABOUT IF SHE EVEN NEEDED TO TAKE ANTIBIOTICS WITH THE SYMPTOMS SHE IS HAVING.     [phone]    THANK YOU.

## 2018-05-17 ENCOUNTER — HOSPITAL ENCOUNTER (OUTPATIENT)
Facility: HOSPITAL | Age: 23
Setting detail: OBSERVATION
Discharge: HOME | End: 2018-05-17
Attending: FAMILY MEDICINE | Admitting: OTOLARYNGOLOGY

## 2018-05-17 VITALS
SYSTOLIC BLOOD PRESSURE: 121 MMHG | TEMPERATURE: 97 F | RESPIRATION RATE: 18 BRPM | OXYGEN SATURATION: 96 % | HEIGHT: 67 IN | BODY MASS INDEX: 29.51 KG/M2 | HEART RATE: 80 BPM | WEIGHT: 188 LBS | DIASTOLIC BLOOD PRESSURE: 66 MMHG

## 2018-05-17 DIAGNOSIS — J95.830 POST-TONSILLECTOMY HEMORRHAGE: Primary | ICD-10-CM

## 2018-05-17 DIAGNOSIS — E86.0 DEHYDRATION: ICD-10-CM

## 2018-05-17 LAB
ALBUMIN SERPL-MCNC: 4.3 G/DL (ref 3.4–4.8)
ALBUMIN/GLOB SERPL: 1.1 G/DL (ref 1.1–1.8)
ALP SERPL-CCNC: 68 U/L (ref 38–126)
ALT SERPL W P-5'-P-CCNC: 37 U/L (ref 9–52)
ANION GAP SERPL CALCULATED.3IONS-SCNC: 13 MMOL/L (ref 5–15)
AST SERPL-CCNC: 27 U/L (ref 14–36)
BASOPHILS # BLD AUTO: 0.02 10*3/MM3 (ref 0–0.2)
BASOPHILS NFR BLD AUTO: 0.2 % (ref 0–2)
BILIRUB SERPL-MCNC: 0.6 MG/DL (ref 0.2–1.3)
BUN BLD-MCNC: 9 MG/DL (ref 7–21)
BUN/CREAT SERPL: 12.7 (ref 7–25)
CALCIUM SPEC-SCNC: 10.1 MG/DL (ref 8.4–10.2)
CHLORIDE SERPL-SCNC: 97 MMOL/L (ref 95–110)
CO2 SERPL-SCNC: 27 MMOL/L (ref 22–31)
CREAT BLD-MCNC: 0.71 MG/DL (ref 0.5–1)
DEPRECATED RDW RBC AUTO: 36.5 FL (ref 36.4–46.3)
EOSINOPHIL # BLD AUTO: 0.19 10*3/MM3 (ref 0–0.7)
EOSINOPHIL NFR BLD AUTO: 2.3 % (ref 0–7)
ERYTHROCYTE [DISTWIDTH] IN BLOOD BY AUTOMATED COUNT: 12.6 % (ref 11.5–14.5)
GFR SERPL CREATININE-BSD FRML MDRD: 100 ML/MIN/1.73 (ref 60–165)
GLOBULIN UR ELPH-MCNC: 3.8 GM/DL (ref 2.3–3.5)
GLUCOSE BLD-MCNC: 90 MG/DL (ref 60–100)
HCT VFR BLD AUTO: 36.2 % (ref 35–45)
HGB BLD-MCNC: 13.1 G/DL (ref 12–15.5)
IMM GRANULOCYTES # BLD: 0.03 10*3/MM3 (ref 0–0.02)
IMM GRANULOCYTES NFR BLD: 0.4 % (ref 0–0.5)
LYMPHOCYTES # BLD AUTO: 2.71 10*3/MM3 (ref 0.6–4.2)
LYMPHOCYTES NFR BLD AUTO: 32.3 % (ref 10–50)
MCH RBC QN AUTO: 29.1 PG (ref 26.5–34)
MCHC RBC AUTO-ENTMCNC: 36.2 G/DL (ref 31.4–36)
MCV RBC AUTO: 80.4 FL (ref 80–98)
MONOCYTES # BLD AUTO: 0.75 10*3/MM3 (ref 0–0.9)
MONOCYTES NFR BLD AUTO: 8.9 % (ref 0–12)
NEUTROPHILS # BLD AUTO: 4.69 10*3/MM3 (ref 2–8.6)
NEUTROPHILS NFR BLD AUTO: 55.9 % (ref 37–80)
PLATELET # BLD AUTO: 217 10*3/MM3 (ref 150–450)
PMV BLD AUTO: 9.6 FL (ref 8–12)
POTASSIUM BLD-SCNC: 3.7 MMOL/L (ref 3.5–5.1)
PROT SERPL-MCNC: 8.1 G/DL (ref 6.3–8.6)
RBC # BLD AUTO: 4.5 10*6/MM3 (ref 3.77–5.16)
SODIUM BLD-SCNC: 137 MMOL/L (ref 137–145)
WBC NRBC COR # BLD: 8.39 10*3/MM3 (ref 3.2–9.8)

## 2018-05-17 PROCEDURE — 80053 COMPREHEN METABOLIC PANEL: CPT | Performed by: FAMILY MEDICINE

## 2018-05-17 PROCEDURE — 85025 COMPLETE CBC W/AUTO DIFF WBC: CPT | Performed by: FAMILY MEDICINE

## 2018-05-17 PROCEDURE — G0378 HOSPITAL OBSERVATION PER HR: HCPCS

## 2018-05-17 PROCEDURE — 96360 HYDRATION IV INFUSION INIT: CPT

## 2018-05-17 PROCEDURE — 99284 EMERGENCY DEPT VISIT MOD MDM: CPT

## 2018-05-17 PROCEDURE — 96361 HYDRATE IV INFUSION ADD-ON: CPT

## 2018-05-17 PROCEDURE — 99024 POSTOP FOLLOW-UP VISIT: CPT | Performed by: OTOLARYNGOLOGY

## 2018-05-17 RX ORDER — DIPHENOXYLATE HCL/ATROPINE 2.5-.025/5
10 LIQUID (ML) ORAL 4 TIMES DAILY PRN
Status: DISCONTINUED | OUTPATIENT
Start: 2018-05-17 | End: 2018-05-17 | Stop reason: HOSPADM

## 2018-05-17 RX ORDER — OXYCODONE HCL 5 MG/5 ML
7.5 SOLUTION, ORAL ORAL EVERY 4 HOURS PRN
Status: DISCONTINUED | OUTPATIENT
Start: 2018-05-17 | End: 2018-05-17 | Stop reason: HOSPADM

## 2018-05-17 RX ORDER — SODIUM CHLORIDE 9 MG/ML
125 INJECTION, SOLUTION INTRAVENOUS CONTINUOUS
Status: DISCONTINUED | OUTPATIENT
Start: 2018-05-17 | End: 2018-05-17 | Stop reason: HOSPADM

## 2018-05-17 RX ORDER — FAMOTIDINE 20 MG/1
20 TABLET, FILM COATED ORAL 2 TIMES DAILY
Status: DISCONTINUED | OUTPATIENT
Start: 2018-05-17 | End: 2018-05-17 | Stop reason: HOSPADM

## 2018-05-17 RX ORDER — ONDANSETRON 4 MG/1
8 TABLET, ORALLY DISINTEGRATING ORAL EVERY 8 HOURS PRN
Status: DISCONTINUED | OUTPATIENT
Start: 2018-05-17 | End: 2018-05-17 | Stop reason: HOSPADM

## 2018-05-17 RX ADMIN — SODIUM CHLORIDE 1000 ML: 9 INJECTION, SOLUTION INTRAVENOUS at 08:01

## 2018-05-17 RX ADMIN — SODIUM CHLORIDE 125 ML/HR: 9 INJECTION, SOLUTION INTRAVENOUS at 09:22

## 2018-05-17 RX ADMIN — Medication 6 MG: at 15:06

## 2018-05-17 RX ADMIN — Medication 6 MG: at 11:27

## 2018-05-17 RX ADMIN — FAMOTIDINE 20 MG: 20 TABLET ORAL at 09:22

## 2018-05-17 NOTE — DISCHARGE SUMMARY
Date of Discharge:  5/17/2018    Discharge Diagnosis:  Post tonsillectomy bleeding minor  Dehydration    Presenting Problem/History of Present Illness  Dehydration [E86.0]  Post-tonsillectomy hemorrhage [J95.89]        Hospital Course  Patient is a 23 y.o. female presented with  A  all amount of bleeding this morning when she woke up.  When I saw her the EDshe had no active bleeding was healing well.  She had no signs of yeast infection her hematocrit was within reason for her postop state.  She had increased pulse rate consistent with dehydration.  Having minor diarrhea but not enough to implement the C. difficile protocol     Procedures Performed  None       Consults:   Consults     Date and Time Order Name Status Description    5/17/2018 0711 ENT (on-call MD unless specified)            Pertinent Test Results   Glucose 60 - 100 mg/dL 90    BUN 7 - 21 mg/dL 9    Creatinine 0.50 - 1.00 mg/dL 0.71    Sodium 137 - 145 mmol/L 137    Potassium 3.5 - 5.1 mmol/L 3.7    Chloride 95 - 110 mmol/L 97    CO2 22.0 - 31.0 mmol/L 27.0    Calcium 8.4 - 10.2 mg/dL 10.1    Total Protein 6.3 - 8.6 g/dL 8.1    Albumin 3.40 - 4.80 g/dL 4.30    ALT (SGPT) 9 - 52 U/L 37    AST (SGOT) 14 - 36 U/L 27    Alkaline Phosphatase 38 - 126 U/L 68    Total Bilirubin 0.2 - 1.3 mg/dL 0.6    eGFR Non African Amer >60 mL/min/1.73 100    Globulin 2.3 - 3.5 gm/dL 3.8     A/G Ratio 1.1 - 1.8 g/dL 1.1    BUN/Creatinine Ratio 7.0 - 25.0 12.7    Anion Gap 5.0 - 15.0 mmol/L 13.0    Resulting Agency  [facility]      Specimen Collected: 05/17/18 08:02   Last Resulted: 05/17/18 08:21           Units 06:44    WBC 3.20 - 9.80 10*3/mm3 8.39    RBC 3.77 - 5.16 10*6/mm3 4.50    Hemoglobin 12.0 - 15.5 g/dL 13.1    Hematocrit 35.0 - 45.0 % 36.2    MCV 80.0 - 98.0 fL 80.4    MCH 26.5 - 34.0 pg 29.1    MCHC 31.4 - 36.0 g/dL 36.2     RDW 11.5 - 14.5 % 12.6    RDW-SD 36.4 - 46.3 fl 36.5    MPV 8.0 - 12.0 fL 9.6    Platelets 150 - 450 10*3/mm3 217    Neutrophil % 37.0 -  80.0 % 55.9    Lymphocyte % 10.0 - 50.0 % 32.3    Monocyte % 0.0 - 12.0 % 8.9    Eosinophil % 0.0 - 7.0 % 2.3    Basophil % 0.0 - 2.0 % 0.2    Immature Grans % 0.0 - 0.5 % 0.4    Neutrophils, Absolute 2.00 - 8.60 10*3/mm3 4.69    Lymphocytes, Absolute 0.60 - 4.20 10*3/mm3 2.71    Monocytes, Absolute 0.00 - 0.90 10*3/mm3 0.75    Eosinophils, Absolute 0.00 - 0.70 10*3/mm3 0.19    Basophils, Absolute 0.00 - 0.20 10*3/mm3          Condition on Discharge:   Removed    Vital Signs  Temp:  [96.8 °F (36 °C)-98 °F (36.7 °C)] 96.8 °F (36 °C)  Heart Rate:  [] 88  Resp:  [18] 18  BP: (117-133)/(68-92) 117/68    Physical Exam:   At the time discharged she no active bleeding or clots or throat her pulse rate is decreased to the mid 90s and she is speaking in breathing and eating well    Discharge Disposition  Home or Self Care    Discharge Medications   Vidhi Moody   Home Medication Instructions REECE:[id number]    Printed on:05/17/18 1640   Medication Information                      ondansetron ODT (ZOFRAN ODT) 8 MG disintegrating tablet  Take 1 tablet by mouth Every 8 (Eight) Hours As Needed for Nausea or Vomiting.             oxyCODONE (ROXICODONE) 5 MG/5ML solution  Take 7.5 mL by mouth Every 4 (Four) Hours As Needed for Severe Pain .                 Discharge Diet:     Activity at Discharge:   Activity Instructions     Discharge Activity Restrictions       1) No driving when a no longer taking narcotics.   2) Return to school / work in 8 days.   .  3) Do not lift / push / pull more then  15 lbs.    Other Instructions (Specify)       Push fluids  Call if not voiding at least bid  To ED [place] if bleeding greater than 10 ml  Call if T>101- 1 hr after pain med  Call if questions  F/u 3 weeks          Follow-up Appointments  Future Appointments  Date Time Provider Department Center   5/21/2018 3:15 PM Qing [facility] None   5/25/2018 9:30 AM Kristen Rae MD [facility] None    6/4/2018 2:45 PM Severiano Marshall MD [facility] None     Additional Instructions for the Follow-ups that You Need to Schedule     Call MD With Problems / Concerns    As directed      If significant bleeding go to ED  Call, if other concerns    Order Comments:  If significant bleeding go to ED Call, if other concerns          Discharge Follow-up with Specialty: AYAAN Marshall; 1 Week    As directed      Specialty:  AYAAN Marshall    Follow Up:  1 Week    Follow Up Details:  already scheduled               Test Results Pending at Discharge       Severiano Marshall MD  05/17/18  4:40 PM    Time:  20 minutes

## 2018-05-17 NOTE — DISCHARGE INSTRUCTIONS
Push fluids  Call if not voiding at least bid  To ED [place] if bleeding greater than 10 ml  Call if T>101- 1 hr after pain med  Call if questions  F/u 1 week

## 2018-05-17 NOTE — PLAN OF CARE
Problem: Patient Care Overview  Goal: Plan of Care Review  Outcome: Ongoing (interventions implemented as appropriate)   05/17/18 1404   Coping/Psychosocial   Plan of Care Reviewed With patient   Plan of Care Review   Progress no change   OTHER   Outcome Summary pt vss. No bleeding since coming to floor     Goal: Individualization and Mutuality  Outcome: Ongoing (interventions implemented as appropriate)    Goal: Discharge Needs Assessment  Outcome: Ongoing (interventions implemented as appropriate)      Problem: Pain, Acute (Adult)  Goal: Identify Related Risk Factors and Signs and Symptoms  Outcome: Outcome(s) achieved Date Met: 05/17/18    Goal: Acceptable Pain Control/Comfort Level  Outcome: Ongoing (interventions implemented as appropriate)

## 2018-05-17 NOTE — ED NOTES
Patient had tonsillectomy performed 1 week ago and began experiencing bleeding this morning around an hour ago. The patient's significant other states the patient woke up gagging on the blood and rushed her in as directed by the post-op instructions.     Helen Gray RN  05/17/18 5093

## 2018-05-17 NOTE — ED PROVIDER NOTES
Subjective   Patient had tonsillectomy performed by Dr. Marshall one week ago.  Patient states during the week she did not had any complications until this morning when she began bleeding.        Sore Throat   Location:  Generalized  Quality:  Aching  Severity:  Moderate  Onset quality:  Sudden  Duration:  1 day  Timing:  Constant  Progression:  Worsening  Chronicity:  New  Relieved by:  Nothing  Worsened by:  Nothing  Ineffective treatments:  None tried  Associated symptoms: trouble swallowing    Associated symptoms: no abdominal pain, no adenopathy, no chest pain, no chills, no cough, no ear discharge, no ear pain, no epistaxis, no eye discharge, no fever, no headaches, no rash, no rhinorrhea and no shortness of breath        Review of Systems   Constitutional: Negative for appetite change, chills, diaphoresis, fatigue and fever.   HENT: Positive for sore throat and trouble swallowing. Negative for congestion, ear discharge, ear pain, nosebleeds, rhinorrhea and sinus pressure.    Eyes: Negative for discharge and redness.   Respiratory: Negative for apnea, cough, chest tightness, shortness of breath and wheezing.    Cardiovascular: Negative for chest pain.   Gastrointestinal: Positive for diarrhea. Negative for abdominal pain, nausea and vomiting.   Endocrine: Negative for polyuria.   Genitourinary: Negative for dysuria, frequency and urgency.   Musculoskeletal: Negative for myalgias and neck pain.   Skin: Negative for color change and rash.   Allergic/Immunologic: Negative for immunocompromised state.   Neurological: Negative for dizziness, seizures, syncope, weakness, light-headedness and headaches.   Hematological: Negative for adenopathy. Does not bruise/bleed easily.   Psychiatric/Behavioral: Negative for behavioral problems and confusion.   All other systems reviewed and are negative.      Past Medical History:   Diagnosis Date   • Enlarged tonsils    • Miscarriage    • Sore throat, chronic        Allergies    Allergen Reactions   • Hydrocodone Nausea And Vomiting   • Dilaudid [Hydromorphone] Irritability   • Adhesive Tape Hives       Past Surgical History:   Procedure Laterality Date   • DILATATION AND CURETTAGE     • SHOULDER SURGERY Right    • TONSILLECTOMY AND ADENOIDECTOMY N/A 5/11/2018    Procedure: TONSILLECTOMY AND NASOPHARYNGOSCOPY   (PLEASE CALL PAT,);  Surgeon: Severiano Marshall MD;  Location: [facility];  Service: ENT   • WISDOM TOOTH EXTRACTION         Family History   Problem Relation Age of Onset   • Hypertension Mother    • Hypertension Maternal Grandfather    • Hyperlipidemia Maternal Grandfather    • Heart disease Maternal Grandfather    • Breast cancer Paternal Grandmother 50   • Colon cancer Paternal Uncle 50   • Diabetes Paternal Aunt         unsure which type or age of onset       Social History     Social History   • Marital status:      Social History Main Topics   • Smoking status: Never Smoker   • Smokeless tobacco: Never Used   • Alcohol use Yes      Comment: occassional   • Drug use: No   • Sexual activity: Defer     Other Topics Concern   • Not on file           Objective   Physical Exam   Constitutional: She is oriented to person, place, and time. She appears well-developed and well-nourished.   HENT:   Head: Normocephalic and atraumatic.   Nose: Nose normal.   Mouth/Throat: Oropharyngeal exudate and posterior oropharyngeal erythema present.   Tonsils appeared to be healing as expected one week post tonsillectomy.  Some bleeding is noted over the right tonsill.   Eyes: Conjunctivae and EOM are normal. Pupils are equal, round, and reactive to light. Right eye exhibits no discharge. Left eye exhibits no discharge. No scleral icterus.   Neck: Normal range of motion. Neck supple. No tracheal deviation present.   Cardiovascular: Normal rate, regular rhythm and normal heart sounds.    No murmur heard.  Pulmonary/Chest: Effort normal and breath sounds normal. No stridor. No respiratory  distress. She has no wheezes. She has no rales.   Abdominal: Soft. Bowel sounds are normal. She exhibits no distension and no mass. There is no tenderness. There is no rebound and no guarding.   Musculoskeletal: She exhibits no edema.   Neurological: She is alert and oriented to person, place, and time. Coordination normal.   Skin: Skin is warm and dry. No rash noted. No erythema.   Psychiatric: She has a normal mood and affect. Her behavior is normal. Thought content normal.   Nursing note and vitals reviewed.      Procedures           ED Course  ED Course as of May 17 0711   Thu May 17, 2018   0711 Dr. Marshall in the emergency department and has seen patient.  He'll admit patient for observation today.  [CB]      ED Course User Index  [CB] Akbar Piña MD          Labs Reviewed   CBC WITH AUTO DIFFERENTIAL - Abnormal; Notable for the following:        Result Value    MCHC 36.2 (*)     Immature Grans, Absolute 0.03 (*)     All other components within normal limits   COMPREHENSIVE METABOLIC PANEL   CBC AND DIFFERENTIAL    Narrative:     The following orders were created for panel order CBC & Differential.  Procedure                               Abnormality         Status                     ---------                               -----------         ------                     CBC Auto Differential[[id number]]        Abnormal            Final result                 Please view results for these tests on the individual orders.       No orders to display                 MDM      Final diagnoses:   Post-tonsillectomy hemorrhage   Dehydration            Akbar Piña MD  05/17/18 0794

## 2018-05-25 ENCOUNTER — DOCUMENTATION (OUTPATIENT)
Dept: OTOLARYNGOLOGY | Facility: CLINIC | Age: 23
End: 2018-05-25

## 2018-06-04 ENCOUNTER — OFFICE VISIT (OUTPATIENT)
Dept: OTOLARYNGOLOGY | Facility: CLINIC | Age: 23
End: 2018-06-04

## 2018-06-04 VITALS — HEIGHT: 67 IN | WEIGHT: 182.4 LBS | BODY MASS INDEX: 28.63 KG/M2 | TEMPERATURE: 98.1 F

## 2018-06-04 DIAGNOSIS — J35.01 CHRONIC TONSILLITIS: Primary | ICD-10-CM

## 2018-06-04 PROCEDURE — 99024 POSTOP FOLLOW-UP VISIT: CPT | Performed by: OTOLARYNGOLOGY

## 2018-06-04 NOTE — PATIENT INSTRUCTIONS

## 2018-06-04 NOTE — PROGRESS NOTES
Patient comes back in follow-up she had episode of bleeding is very short-lived and has done well since that time..    Following well back to normal activity and diet any normal she's not having any new symptoms in the minimal pain at this point she said it was less painful than she thought was carried.  Has healed well and she is to resume normal activity and diet   He is to call us for questions or problems please she's done well reviewed the pathology which showed chronic tonsillitis and no other abnormality..  AYAAN Marshall M.D.

## 2018-07-05 ENCOUNTER — LAB (OUTPATIENT)
Dept: LAB | Facility: HOSPITAL | Age: 23
End: 2018-07-05

## 2018-07-05 DIAGNOSIS — N92.6 MISSED PERIOD: Primary | ICD-10-CM

## 2018-07-05 DIAGNOSIS — N92.6 MISSED PERIOD: ICD-10-CM

## 2018-07-05 LAB — HCG INTACT+B SERPL-ACNC: 140.32 MIU/ML

## 2018-07-05 PROCEDURE — 84702 CHORIONIC GONADOTROPIN TEST: CPT

## 2018-07-05 PROCEDURE — 36415 COLL VENOUS BLD VENIPUNCTURE: CPT

## 2018-07-10 ENCOUNTER — LAB (OUTPATIENT)
Dept: LAB | Facility: HOSPITAL | Age: 23
End: 2018-07-10

## 2018-07-10 ENCOUNTER — TELEPHONE (OUTPATIENT)
Dept: OBSTETRICS AND GYNECOLOGY | Facility: CLINIC | Age: 23
End: 2018-07-10

## 2018-07-10 DIAGNOSIS — Z32.01 POSITIVE PREGNANCY TEST: Primary | ICD-10-CM

## 2018-07-10 DIAGNOSIS — Z32.01 POSITIVE PREGNANCY TEST: ICD-10-CM

## 2018-07-10 LAB — HCG INTACT+B SERPL-ACNC: 934.99 MIU/ML

## 2018-07-10 PROCEDURE — 84702 CHORIONIC GONADOTROPIN TEST: CPT

## 2018-07-10 PROCEDURE — 36415 COLL VENOUS BLD VENIPUNCTURE: CPT

## 2018-07-10 NOTE — TELEPHONE ENCOUNTER
----- Message from Dolores Ortiz sent at 7/10/2018 11:13 AM CDT -----      ----- Message -----  From: Dipika Jin CNA  Sent: 7/10/2018   8:16 AM  To: Sana Robert MA, Dolores Ortiz    Pt called and said she was suppose to have a follow up HCG from . She said she has been trying to contact a nurse and sent messages and no one has got back to her. She states she is high risk, She asked to make a new OB appointment with someone other than  since she couldn't get in contact with the nurses. She is asking that  or someone please order her a HCG lab. I told her someone would call her back.         Called left pt a VM to contact the office

## 2018-07-13 ENCOUNTER — TELEPHONE (OUTPATIENT)
Dept: OBSTETRICS AND GYNECOLOGY | Facility: CLINIC | Age: 23
End: 2018-07-13

## 2018-07-13 NOTE — TELEPHONE ENCOUNTER
----- Message from Echo Pathak sent at 7/13/2018  9:54 AM CDT -----  Contact: [phone]  PATIENT IS WANTING TO GET A CALLBACK. SHE HAS SOME QUESTIONS

## 2018-08-10 ENCOUNTER — TRANSCRIBE ORDERS (OUTPATIENT)
Dept: LAB | Facility: HOSPITAL | Age: 23
End: 2018-08-10

## 2018-08-10 ENCOUNTER — LAB (OUTPATIENT)
Dept: LAB | Facility: HOSPITAL | Age: 23
End: 2018-08-10

## 2018-08-10 DIAGNOSIS — Z34.90 PREGNANCY, UNSPECIFIED GESTATIONAL AGE: ICD-10-CM

## 2018-08-10 DIAGNOSIS — Z34.90 PREGNANCY, UNSPECIFIED GESTATIONAL AGE: Primary | ICD-10-CM

## 2018-08-10 LAB
ABO GROUP BLD: NORMAL
BASOPHILS # BLD AUTO: 0.01 10*3/MM3 (ref 0–0.2)
BASOPHILS NFR BLD AUTO: 0.1 % (ref 0–2)
BLD GP AB SCN SERPL QL: NEGATIVE
DEPRECATED RDW RBC AUTO: 37.5 FL (ref 36.4–46.3)
EOSINOPHIL # BLD AUTO: 0.04 10*3/MM3 (ref 0–0.7)
EOSINOPHIL NFR BLD AUTO: 0.4 % (ref 0–7)
ERYTHROCYTE [DISTWIDTH] IN BLOOD BY AUTOMATED COUNT: 12.8 % (ref 11.5–14.5)
HBV SURFACE AG SERPL QL IA: NEGATIVE
HCT VFR BLD AUTO: 36.6 % (ref 35–45)
HCV AB SER DONR QL: NEGATIVE
HGB BLD-MCNC: 12.9 G/DL (ref 12–15.5)
HIV1+2 AB SER QL: NEGATIVE
IMM GRANULOCYTES # BLD: 0.04 10*3/MM3 (ref 0–0.02)
IMM GRANULOCYTES NFR BLD: 0.4 % (ref 0–0.5)
LYMPHOCYTES # BLD AUTO: 2.83 10*3/MM3 (ref 0.6–4.2)
LYMPHOCYTES NFR BLD AUTO: 28.1 % (ref 10–50)
Lab: NORMAL
MCH RBC QN AUTO: 28.4 PG (ref 26.5–34)
MCHC RBC AUTO-ENTMCNC: 35.2 G/DL (ref 31.4–36)
MCV RBC AUTO: 80.4 FL (ref 80–98)
MONOCYTES # BLD AUTO: 0.71 10*3/MM3 (ref 0–0.9)
MONOCYTES NFR BLD AUTO: 7.1 % (ref 0–12)
NEUTROPHILS # BLD AUTO: 6.44 10*3/MM3 (ref 2–8.6)
NEUTROPHILS NFR BLD AUTO: 63.9 % (ref 37–80)
PLATELET # BLD AUTO: 223 10*3/MM3 (ref 150–450)
PMV BLD AUTO: 10 FL (ref 8–12)
RBC # BLD AUTO: 4.55 10*6/MM3 (ref 3.77–5.16)
RH BLD: POSITIVE
RUBV IGG SER QL: ABNORMAL
RUBV IGG SER-ACNC: 28.6 IU/ML (ref 0–9.9)
T4 FREE SERPL-MCNC: 0.97 NG/DL (ref 0.78–2.19)
TSH SERPL DL<=0.05 MIU/L-ACNC: 0.9 MIU/ML (ref 0.46–4.68)
WBC NRBC COR # BLD: 10.07 10*3/MM3 (ref 3.2–9.8)

## 2018-08-10 PROCEDURE — 86762 RUBELLA ANTIBODY: CPT

## 2018-08-10 PROCEDURE — G0432 EIA HIV-1/HIV-2 SCREEN: HCPCS

## 2018-08-10 PROCEDURE — 84439 ASSAY OF FREE THYROXINE: CPT

## 2018-08-10 PROCEDURE — 87340 HEPATITIS B SURFACE AG IA: CPT

## 2018-08-10 PROCEDURE — 85025 COMPLETE CBC W/AUTO DIFF WBC: CPT

## 2018-08-10 PROCEDURE — 84443 ASSAY THYROID STIM HORMONE: CPT

## 2018-08-10 PROCEDURE — 86900 BLOOD TYPING SEROLOGIC ABO: CPT

## 2018-08-10 PROCEDURE — 86803 HEPATITIS C AB TEST: CPT

## 2018-08-10 PROCEDURE — 36415 COLL VENOUS BLD VENIPUNCTURE: CPT

## 2018-08-10 PROCEDURE — 86901 BLOOD TYPING SEROLOGIC RH(D): CPT

## 2018-08-10 PROCEDURE — 86850 RBC ANTIBODY SCREEN: CPT

## 2018-08-11 LAB — RPR SER QL: NORMAL

## 2018-08-21 ENCOUNTER — HOSPITAL ENCOUNTER (OUTPATIENT)
Dept: ULTRASOUND IMAGING | Facility: HOSPITAL | Age: 23
Discharge: HOME | End: 2018-08-21
Admitting: OBSTETRICS & GYNECOLOGY

## 2018-08-21 DIAGNOSIS — O20.0 ABORTION, THREATENED: ICD-10-CM

## 2018-08-21 PROCEDURE — 76817 TRANSVAGINAL US OBSTETRIC: CPT

## 2018-09-05 ENCOUNTER — HOSPITAL ENCOUNTER (OUTPATIENT)
Dept: ULTRASOUND IMAGING | Facility: HOSPITAL | Age: 23
Discharge: HOME | End: 2018-09-05
Admitting: OBSTETRICS & GYNECOLOGY

## 2018-09-05 DIAGNOSIS — O20.0 ABORTION, THREATENED: ICD-10-CM

## 2018-09-05 PROCEDURE — 76801 OB US < 14 WKS SINGLE FETUS: CPT

## 2018-10-01 DIAGNOSIS — O20.0 THREATENED ABORTION, ANTEPARTUM: Primary | ICD-10-CM

## 2018-10-09 ENCOUNTER — HOSPITAL ENCOUNTER (OUTPATIENT)
Dept: ULTRASOUND IMAGING | Facility: HOSPITAL | Age: 23
Discharge: HOME | End: 2018-10-09

## 2018-11-08 ENCOUNTER — TRANSCRIBE ORDERS (OUTPATIENT)
Dept: LAB | Facility: HOSPITAL | Age: 23
End: 2018-11-08

## 2018-11-08 DIAGNOSIS — O20.0 THREATENED ABORTION IN SECOND TRIMESTER: Primary | ICD-10-CM

## 2018-11-08 DIAGNOSIS — Z34.90 PRENATAL CARE, ANTEPARTUM: Primary | ICD-10-CM

## 2018-11-26 ENCOUNTER — LAB (OUTPATIENT)
Dept: LAB | Facility: HOSPITAL | Age: 23
End: 2018-11-26

## 2018-11-26 DIAGNOSIS — Z34.00 SUPERVISION OF NORMAL FIRST PREGNANCY, ANTEPARTUM: Primary | ICD-10-CM

## 2018-11-26 DIAGNOSIS — Z34.90 PRENATAL CARE, ANTEPARTUM: ICD-10-CM

## 2018-11-26 DIAGNOSIS — Z34.90 PREGNANCY, UNSPECIFIED GESTATIONAL AGE: ICD-10-CM

## 2018-11-26 LAB
AMPHET+METHAMPHET UR QL: NEGATIVE
BARBITURATES UR QL SCN: NEGATIVE
BASOPHILS # BLD AUTO: 0.01 10*3/MM3 (ref 0–0.2)
BASOPHILS NFR BLD AUTO: 0.1 % (ref 0–2)
BENZODIAZ UR QL SCN: NEGATIVE
BILIRUB UR QL STRIP: NEGATIVE
CANNABINOIDS SERPL QL: NEGATIVE
CLARITY UR: CLEAR
COCAINE UR QL: NEGATIVE
COLOR UR: YELLOW
DEPRECATED RDW RBC AUTO: 41.8 FL (ref 36.4–46.3)
EOSINOPHIL # BLD AUTO: 0.05 10*3/MM3 (ref 0–0.7)
EOSINOPHIL NFR BLD AUTO: 0.4 % (ref 0–7)
ERYTHROCYTE [DISTWIDTH] IN BLOOD BY AUTOMATED COUNT: 13.7 % (ref 11.5–14.5)
GLUCOSE 1H P 100 G GLC PO SERPL-MCNC: 82 MG/DL (ref 60–140)
GLUCOSE UR STRIP-MCNC: NEGATIVE MG/DL
HCT VFR BLD AUTO: 34.4 % (ref 35–45)
HGB BLD-MCNC: 12.3 G/DL (ref 12–15.5)
HGB UR QL STRIP.AUTO: NEGATIVE
IMM GRANULOCYTES # BLD: 0.05 10*3/MM3 (ref 0–0.02)
IMM GRANULOCYTES NFR BLD: 0.4 % (ref 0–0.5)
KETONES UR QL STRIP: NEGATIVE
LEUKOCYTE ESTERASE UR QL STRIP.AUTO: NEGATIVE
LYMPHOCYTES # BLD AUTO: 2.79 10*3/MM3 (ref 0.6–4.2)
LYMPHOCYTES NFR BLD AUTO: 22.4 % (ref 10–50)
MCH RBC QN AUTO: 30.1 PG (ref 26.5–34)
MCHC RBC AUTO-ENTMCNC: 35.8 G/DL (ref 31.4–36)
MCV RBC AUTO: 84.1 FL (ref 80–98)
METHADONE UR QL SCN: NEGATIVE
MONOCYTES # BLD AUTO: 0.63 10*3/MM3 (ref 0–0.9)
MONOCYTES NFR BLD AUTO: 5.1 % (ref 0–12)
NEUTROPHILS # BLD AUTO: 8.91 10*3/MM3 (ref 2–8.6)
NEUTROPHILS NFR BLD AUTO: 71.6 % (ref 37–80)
NITRITE UR QL STRIP: NEGATIVE
OPIATES UR QL: NEGATIVE
OXYCODONE UR QL SCN: NEGATIVE
PH UR STRIP.AUTO: 8 [PH] (ref 5–9)
PLATELET # BLD AUTO: 155 10*3/MM3 (ref 150–450)
PMV BLD AUTO: 9.9 FL (ref 8–12)
PROT UR QL STRIP: NEGATIVE
RBC # BLD AUTO: 4.09 10*6/MM3 (ref 3.77–5.16)
SP GR UR STRIP: 1.01 (ref 1–1.03)
UROBILINOGEN UR QL STRIP: NORMAL
WBC NRBC COR # BLD: 12.44 10*3/MM3 (ref 3.2–9.8)

## 2018-11-26 PROCEDURE — 36415 COLL VENOUS BLD VENIPUNCTURE: CPT | Performed by: OBSTETRICS & GYNECOLOGY

## 2018-11-26 PROCEDURE — 80307 DRUG TEST PRSMV CHEM ANLYZR: CPT

## 2018-11-26 PROCEDURE — 85025 COMPLETE CBC W/AUTO DIFF WBC: CPT | Performed by: OBSTETRICS & GYNECOLOGY

## 2018-11-26 PROCEDURE — 82950 GLUCOSE TEST: CPT

## 2018-11-26 PROCEDURE — 81003 URINALYSIS AUTO W/O SCOPE: CPT

## 2018-11-27 ENCOUNTER — ROUTINE PRENATAL (OUTPATIENT)
Dept: OBSTETRICS AND GYNECOLOGY | Facility: CLINIC | Age: 23
End: 2018-11-27

## 2018-11-27 VITALS — DIASTOLIC BLOOD PRESSURE: 70 MMHG | SYSTOLIC BLOOD PRESSURE: 114 MMHG | WEIGHT: 197 LBS | BODY MASS INDEX: 30.85 KG/M2

## 2018-11-27 DIAGNOSIS — R10.9 RIGHT FLANK DISCOMFORT: Primary | ICD-10-CM

## 2018-11-27 PROCEDURE — 0502F SUBSEQUENT PRENATAL CARE: CPT | Performed by: OBSTETRICS & GYNECOLOGY

## 2018-11-27 RX ORDER — PRENATAL VIT/IRON FUM/FOLIC AC 27MG-0.8MG
TABLET ORAL DAILY
COMMUNITY
End: 2019-10-16

## 2018-11-27 NOTE — PROGRESS NOTES
Patient is in process transferring care [facility] to [facility].  She has an appointment later on this week.  Today for complaints of flank pain worse on the right denies fever chills or sweats.  We done a urinalysis with reflex to urine culture but this was not met.  There was no blood in the urine.  CVA.  Exam is negative.  Where go ahead and get a bilateral renal ultrasound and see her back on Thursday report to the hospital any increased pain in the interim

## 2018-11-29 ENCOUNTER — ROUTINE PRENATAL (OUTPATIENT)
Dept: OBSTETRICS AND GYNECOLOGY | Facility: CLINIC | Age: 23
End: 2018-11-29

## 2018-11-29 VITALS — BODY MASS INDEX: 31.01 KG/M2 | WEIGHT: 198 LBS | SYSTOLIC BLOOD PRESSURE: 110 MMHG | DIASTOLIC BLOOD PRESSURE: 70 MMHG

## 2018-11-29 DIAGNOSIS — N13.30 HYDRONEPHROSIS OF RIGHT KIDNEY: ICD-10-CM

## 2018-11-29 DIAGNOSIS — Z34.00 PRIMIGRAVIDA, ANTEPARTUM: Primary | ICD-10-CM

## 2018-11-29 PROCEDURE — 0502F SUBSEQUENT PRENATAL CARE: CPT | Performed by: OBSTETRICS & GYNECOLOGY

## 2018-12-03 NOTE — PROGRESS NOTES
Patient still was some right flank pain.  Ultrasound shows some right hydronephrosis but no definite stones she has any any blood on UA I think this is from the pregnancy is point she doesn't want to go see urology.

## 2018-12-13 ENCOUNTER — ROUTINE PRENATAL (OUTPATIENT)
Dept: OBSTETRICS AND GYNECOLOGY | Facility: CLINIC | Age: 23
End: 2018-12-13

## 2018-12-13 VITALS — SYSTOLIC BLOOD PRESSURE: 120 MMHG | DIASTOLIC BLOOD PRESSURE: 72 MMHG | WEIGHT: 201.2 LBS | BODY MASS INDEX: 31.51 KG/M2

## 2018-12-13 DIAGNOSIS — N96 HISTORY OF MULTIPLE MISCARRIAGES: ICD-10-CM

## 2018-12-13 DIAGNOSIS — Z34.02 FIRST PREGNANCY, SECOND TRIMESTER: Primary | ICD-10-CM

## 2018-12-13 DIAGNOSIS — Z3A.27 27 WEEKS GESTATION OF PREGNANCY: ICD-10-CM

## 2018-12-13 PROCEDURE — 0502F SUBSEQUENT PRENATAL CARE: CPT | Performed by: OBSTETRICS & GYNECOLOGY

## 2018-12-27 ENCOUNTER — ROUTINE PRENATAL (OUTPATIENT)
Dept: OBSTETRICS AND GYNECOLOGY | Facility: CLINIC | Age: 23
End: 2018-12-27

## 2018-12-27 VITALS — DIASTOLIC BLOOD PRESSURE: 70 MMHG | WEIGHT: 205 LBS | BODY MASS INDEX: 32.11 KG/M2 | SYSTOLIC BLOOD PRESSURE: 114 MMHG

## 2018-12-27 DIAGNOSIS — Z36.9 ENCOUNTER FOR ANTENATAL SCREENING: Primary | ICD-10-CM

## 2018-12-27 DIAGNOSIS — Z3A.29 29 WEEKS GESTATION OF PREGNANCY: ICD-10-CM

## 2018-12-27 DIAGNOSIS — R10.9 ACUTE RIGHT FLANK PAIN: ICD-10-CM

## 2018-12-27 DIAGNOSIS — O09.299 PREGNANCY WITH POOR OBSTETRIC HISTORY: ICD-10-CM

## 2018-12-27 PROCEDURE — 0502F SUBSEQUENT PRENATAL CARE: CPT | Performed by: OBSTETRICS & GYNECOLOGY

## 2018-12-27 PROCEDURE — 87661 TRICHOMONAS VAGINALIS AMPLIF: CPT | Performed by: OBSTETRICS & GYNECOLOGY

## 2018-12-27 PROCEDURE — 87491 CHLMYD TRACH DNA AMP PROBE: CPT | Performed by: OBSTETRICS & GYNECOLOGY

## 2018-12-27 PROCEDURE — 87591 N.GONORRHOEAE DNA AMP PROB: CPT | Performed by: OBSTETRICS & GYNECOLOGY

## 2018-12-28 LAB
C TRACH RRNA CVX QL NAA+PROBE: NEGATIVE
N GONORRHOEA RRNA SPEC QL NAA+PROBE: NEGATIVE
TRICHOMONAS VAGINALIS PCR: NEGATIVE

## 2019-01-05 PROBLEM — R10.9 ACUTE RIGHT FLANK PAIN: Status: ACTIVE | Noted: 2019-01-05

## 2019-01-05 PROBLEM — Z3A.29 29 WEEKS GESTATION OF PREGNANCY: Status: ACTIVE | Noted: 2019-01-05

## 2019-01-06 NOTE — PROGRESS NOTES
Chief complaint here for prenatal care    Patient 29 weeks Rh+    Right flank pain somewhat better

## 2019-01-07 ENCOUNTER — ROUTINE PRENATAL (OUTPATIENT)
Dept: OBSTETRICS AND GYNECOLOGY | Facility: CLINIC | Age: 24
End: 2019-01-07

## 2019-01-07 VITALS — SYSTOLIC BLOOD PRESSURE: 124 MMHG | BODY MASS INDEX: 31.67 KG/M2 | WEIGHT: 202.2 LBS | DIASTOLIC BLOOD PRESSURE: 78 MMHG

## 2019-01-07 DIAGNOSIS — Z3A.30 30 WEEKS GESTATION OF PREGNANCY: Primary | ICD-10-CM

## 2019-01-07 DIAGNOSIS — R10.9 ACUTE RIGHT FLANK PAIN: ICD-10-CM

## 2019-01-07 PROCEDURE — 0502F SUBSEQUENT PRENATAL CARE: CPT | Performed by: OBSTETRICS & GYNECOLOGY

## 2019-01-10 ENCOUNTER — ROUTINE PRENATAL (OUTPATIENT)
Dept: OBSTETRICS AND GYNECOLOGY | Facility: CLINIC | Age: 24
End: 2019-01-10

## 2019-01-10 ENCOUNTER — APPOINTMENT (OUTPATIENT)
Dept: LAB | Facility: HOSPITAL | Age: 24
End: 2019-01-10
Attending: OBSTETRICS & GYNECOLOGY

## 2019-01-10 ENCOUNTER — LAB (OUTPATIENT)
Dept: LAB | Facility: HOSPITAL | Age: 24
End: 2019-01-10

## 2019-01-10 VITALS — DIASTOLIC BLOOD PRESSURE: 80 MMHG | WEIGHT: 209 LBS | SYSTOLIC BLOOD PRESSURE: 134 MMHG | BODY MASS INDEX: 32.73 KG/M2

## 2019-01-10 DIAGNOSIS — J03.90 ACUTE TONSILLITIS, UNSPECIFIED ETIOLOGY: ICD-10-CM

## 2019-01-10 DIAGNOSIS — Z3A.31 31 WEEKS GESTATION OF PREGNANCY: ICD-10-CM

## 2019-01-10 DIAGNOSIS — O13.3: ICD-10-CM

## 2019-01-10 DIAGNOSIS — O13.3: Primary | ICD-10-CM

## 2019-01-10 PROBLEM — O16.9: Status: ACTIVE | Noted: 2019-01-10

## 2019-01-10 LAB
ALBUMIN SERPL-MCNC: 3.9 G/DL (ref 3.4–4.8)
ALBUMIN/GLOB SERPL: 1.3 G/DL (ref 1.1–1.8)
ALP SERPL-CCNC: 99 U/L (ref 38–126)
ALT SERPL W P-5'-P-CCNC: 18 U/L (ref 9–52)
ANION GAP SERPL CALCULATED.3IONS-SCNC: 12 MMOL/L (ref 5–15)
AST SERPL-CCNC: 24 U/L (ref 14–36)
BILIRUB SERPL-MCNC: 0.3 MG/DL (ref 0.2–1.3)
BUN BLD-MCNC: 3 MG/DL (ref 7–21)
BUN/CREAT SERPL: 5.8 (ref 7–25)
CALCIUM SPEC-SCNC: 9.2 MG/DL (ref 8.4–10.2)
CHLORIDE SERPL-SCNC: 100 MMOL/L (ref 95–110)
CO2 SERPL-SCNC: 24 MMOL/L (ref 22–31)
CREAT BLD-MCNC: 0.52 MG/DL (ref 0.5–1)
CREAT UR-MCNC: 71.9 MG/DL
DEPRECATED RDW RBC AUTO: 40.6 FL (ref 36.4–46.3)
ERYTHROCYTE [DISTWIDTH] IN BLOOD BY AUTOMATED COUNT: 13.4 % (ref 11.5–14.5)
GFR SERPL CREATININE-BSD FRML MDRD: 144 ML/MIN/1.73 (ref 71–165)
GLOBULIN UR ELPH-MCNC: 3.1 GM/DL (ref 2.3–3.5)
GLUCOSE BLD-MCNC: 84 MG/DL (ref 60–100)
HCT VFR BLD AUTO: 33.3 % (ref 35–45)
HETEROPH AB SER QL LA: NEGATIVE
HGB BLD-MCNC: 12 G/DL (ref 12–15.5)
MCH RBC QN AUTO: 29.9 PG (ref 26.5–34)
MCHC RBC AUTO-ENTMCNC: 36 G/DL (ref 31.4–36)
MCV RBC AUTO: 83 FL (ref 80–98)
PLATELET # BLD AUTO: 136 10*3/MM3 (ref 150–450)
PMV BLD AUTO: 10.3 FL (ref 8–12)
POTASSIUM BLD-SCNC: 3.6 MMOL/L (ref 3.5–5.1)
PROT SERPL-MCNC: 7 G/DL (ref 6.3–8.6)
PROT UR-MCNC: 15.4 MG/DL
PROT/CREAT UR: 214.2 MG/G CREA (ref 0–200)
RBC # BLD AUTO: 4.01 10*6/MM3 (ref 3.77–5.16)
SODIUM BLD-SCNC: 136 MMOL/L (ref 137–145)
WBC NRBC COR # BLD: 14.8 10*3/MM3 (ref 3.2–9.8)

## 2019-01-10 PROCEDURE — 0502F SUBSEQUENT PRENATAL CARE: CPT | Performed by: OBSTETRICS & GYNECOLOGY

## 2019-01-10 PROCEDURE — 36415 COLL VENOUS BLD VENIPUNCTURE: CPT | Performed by: OBSTETRICS & GYNECOLOGY

## 2019-01-10 PROCEDURE — 84156 ASSAY OF PROTEIN URINE: CPT | Performed by: OBSTETRICS & GYNECOLOGY

## 2019-01-10 PROCEDURE — 59025 FETAL NON-STRESS TEST: CPT | Performed by: OBSTETRICS & GYNECOLOGY

## 2019-01-10 PROCEDURE — 85027 COMPLETE CBC AUTOMATED: CPT

## 2019-01-10 PROCEDURE — 82570 ASSAY OF URINE CREATININE: CPT | Performed by: OBSTETRICS & GYNECOLOGY

## 2019-01-10 PROCEDURE — 86308 HETEROPHILE ANTIBODY SCREEN: CPT

## 2019-01-10 PROCEDURE — 80053 COMPREHEN METABOLIC PANEL: CPT | Performed by: OBSTETRICS & GYNECOLOGY

## 2019-01-11 ENCOUNTER — ROUTINE PRENATAL (OUTPATIENT)
Dept: OBSTETRICS AND GYNECOLOGY | Facility: CLINIC | Age: 24
End: 2019-01-11

## 2019-01-11 VITALS — SYSTOLIC BLOOD PRESSURE: 122 MMHG | DIASTOLIC BLOOD PRESSURE: 62 MMHG | WEIGHT: 204.6 LBS | BODY MASS INDEX: 32.04 KG/M2

## 2019-01-11 DIAGNOSIS — Z3A.31 31 WEEKS GESTATION OF PREGNANCY: ICD-10-CM

## 2019-01-11 DIAGNOSIS — O13.3 GESTATIONAL HYPERTENSION WITHOUT SIGNIFICANT PROTEINURIA IN THIRD TRIMESTER: Primary | ICD-10-CM

## 2019-01-11 PROCEDURE — 0502F SUBSEQUENT PRENATAL CARE: CPT | Performed by: OBSTETRICS & GYNECOLOGY

## 2019-01-13 PROBLEM — Z3A.30 30 WEEKS GESTATION OF PREGNANCY: Status: ACTIVE | Noted: 2019-01-13

## 2019-01-14 ENCOUNTER — ROUTINE PRENATAL (OUTPATIENT)
Dept: OBSTETRICS AND GYNECOLOGY | Facility: CLINIC | Age: 24
End: 2019-01-14

## 2019-01-14 ENCOUNTER — LAB (OUTPATIENT)
Dept: LAB | Facility: HOSPITAL | Age: 24
End: 2019-01-14

## 2019-01-14 VITALS — BODY MASS INDEX: 31.89 KG/M2 | DIASTOLIC BLOOD PRESSURE: 86 MMHG | SYSTOLIC BLOOD PRESSURE: 132 MMHG | WEIGHT: 203.6 LBS

## 2019-01-14 DIAGNOSIS — O13.3 GESTATIONAL HYPERTENSION WITHOUT SIGNIFICANT PROTEINURIA IN THIRD TRIMESTER: ICD-10-CM

## 2019-01-14 DIAGNOSIS — O13.3 GESTATIONAL HYPERTENSION, THIRD TRIMESTER: Primary | ICD-10-CM

## 2019-01-14 DIAGNOSIS — Z3A.32 32 WEEKS GESTATION OF PREGNANCY: ICD-10-CM

## 2019-01-14 LAB
ALBUMIN SERPL-MCNC: 3.6 G/DL (ref 3.4–4.8)
ALBUMIN/GLOB SERPL: 1.2 G/DL (ref 1.1–1.8)
ALP SERPL-CCNC: 96 U/L (ref 38–126)
ALT SERPL W P-5'-P-CCNC: 16 U/L (ref 9–52)
ANION GAP SERPL CALCULATED.3IONS-SCNC: 9 MMOL/L (ref 5–15)
AST SERPL-CCNC: 19 U/L (ref 14–36)
BILIRUB SERPL-MCNC: 0.2 MG/DL (ref 0.2–1.3)
BUN BLD-MCNC: 4 MG/DL (ref 7–21)
BUN/CREAT SERPL: 7.5 (ref 7–25)
CALCIUM SPEC-SCNC: 9.4 MG/DL (ref 8.4–10.2)
CHLORIDE SERPL-SCNC: 102 MMOL/L (ref 95–110)
CO2 SERPL-SCNC: 25 MMOL/L (ref 22–31)
CREAT BLD-MCNC: 0.53 MG/DL (ref 0.5–1)
CREAT UR-MCNC: 139.7 MG/DL
DEPRECATED RDW RBC AUTO: 41 FL (ref 36.4–46.3)
ERYTHROCYTE [DISTWIDTH] IN BLOOD BY AUTOMATED COUNT: 13.5 % (ref 11.5–14.5)
GFR SERPL CREATININE-BSD FRML MDRD: 141 ML/MIN/1.73 (ref 71–165)
GLOBULIN UR ELPH-MCNC: 3.1 GM/DL (ref 2.3–3.5)
GLUCOSE BLD-MCNC: 87 MG/DL (ref 60–100)
HCT VFR BLD AUTO: 33.7 % (ref 35–45)
HGB BLD-MCNC: 11.9 G/DL (ref 12–15.5)
MCH RBC QN AUTO: 29.5 PG (ref 26.5–34)
MCHC RBC AUTO-ENTMCNC: 35.3 G/DL (ref 31.4–36)
MCV RBC AUTO: 83.4 FL (ref 80–98)
PLATELET # BLD AUTO: 129 10*3/MM3 (ref 150–450)
PMV BLD AUTO: 10.2 FL (ref 8–12)
POTASSIUM BLD-SCNC: 3.8 MMOL/L (ref 3.5–5.1)
PROT SERPL-MCNC: 6.7 G/DL (ref 6.3–8.6)
PROT UR-MCNC: 12.7 MG/DL
PROT/CREAT UR: 90.9 MG/G CREA (ref 0–200)
RBC # BLD AUTO: 4.04 10*6/MM3 (ref 3.77–5.16)
SODIUM BLD-SCNC: 136 MMOL/L (ref 137–145)
WBC NRBC COR # BLD: 12.41 10*3/MM3 (ref 3.2–9.8)

## 2019-01-14 PROCEDURE — 0502F SUBSEQUENT PRENATAL CARE: CPT | Performed by: OBSTETRICS & GYNECOLOGY

## 2019-01-14 PROCEDURE — 84156 ASSAY OF PROTEIN URINE: CPT | Performed by: OBSTETRICS & GYNECOLOGY

## 2019-01-14 PROCEDURE — 80053 COMPREHEN METABOLIC PANEL: CPT

## 2019-01-14 PROCEDURE — 85027 COMPLETE CBC AUTOMATED: CPT

## 2019-01-14 PROCEDURE — 82570 ASSAY OF URINE CREATININE: CPT | Performed by: OBSTETRICS & GYNECOLOGY

## 2019-01-14 PROCEDURE — 36415 COLL VENOUS BLD VENIPUNCTURE: CPT

## 2019-01-14 PROCEDURE — 59025 FETAL NON-STRESS TEST: CPT | Performed by: OBSTETRICS & GYNECOLOGY

## 2019-01-14 NOTE — PROGRESS NOTES
Chief complaint blood pressure up at work subjective no headaches visual changes or epigastric pain urine protein creatinine ratio negative.  Overall my impression is gestational hypertension developing I don't think at this point we need to put her off work are admitted to the hospital for steroids but I do think we need to follow closely for developing preeclampsia and with fetal well-being studies

## 2019-01-14 NOTE — PROGRESS NOTES
Chief complaint my blood pressure was up at work    Patient had not felt well had her blood pressure taken at work 136/95 was the highest.  Good today here and check laboratory studies.  NST with V-scan that shows good fluid is reassuring

## 2019-01-17 ENCOUNTER — LAB (OUTPATIENT)
Dept: LAB | Facility: HOSPITAL | Age: 24
End: 2019-01-17

## 2019-01-17 ENCOUNTER — ROUTINE PRENATAL (OUTPATIENT)
Dept: OBSTETRICS AND GYNECOLOGY | Facility: CLINIC | Age: 24
End: 2019-01-17

## 2019-01-17 VITALS — WEIGHT: 208.4 LBS | DIASTOLIC BLOOD PRESSURE: 74 MMHG | SYSTOLIC BLOOD PRESSURE: 122 MMHG | BODY MASS INDEX: 32.64 KG/M2

## 2019-01-17 DIAGNOSIS — O13.9 GESTATIONAL HYPERTENSION WITHOUT SIGNIFICANT PROTEINURIA, ANTEPARTUM: Primary | ICD-10-CM

## 2019-01-17 DIAGNOSIS — O13.9 GESTATIONAL HYPERTENSION WITHOUT SIGNIFICANT PROTEINURIA, ANTEPARTUM: ICD-10-CM

## 2019-01-17 DIAGNOSIS — Z3A.32 32 WEEKS GESTATION OF PREGNANCY: ICD-10-CM

## 2019-01-17 DIAGNOSIS — O13.3 GESTATIONAL HYPERTENSION, THIRD TRIMESTER: ICD-10-CM

## 2019-01-17 LAB
ALBUMIN SERPL-MCNC: 3.9 G/DL (ref 3.4–4.8)
ALBUMIN/GLOB SERPL: 1.4 G/DL (ref 1.1–1.8)
ALP SERPL-CCNC: 104 U/L (ref 38–126)
ALT SERPL W P-5'-P-CCNC: <6 U/L (ref 9–52)
ANION GAP SERPL CALCULATED.3IONS-SCNC: 8 MMOL/L (ref 5–15)
AST SERPL-CCNC: 29 U/L (ref 14–36)
BASOPHILS # BLD AUTO: 0.01 10*3/MM3 (ref 0–0.2)
BASOPHILS NFR BLD AUTO: 0.1 % (ref 0–2)
BILIRUB SERPL-MCNC: 0.3 MG/DL (ref 0.2–1.3)
BUN BLD-MCNC: 4 MG/DL (ref 7–21)
BUN/CREAT SERPL: 6.9 (ref 7–25)
CALCIUM SPEC-SCNC: 9.6 MG/DL (ref 8.4–10.2)
CHLORIDE SERPL-SCNC: 100 MMOL/L (ref 95–110)
CO2 SERPL-SCNC: 24 MMOL/L (ref 22–31)
CREAT BLD-MCNC: 0.58 MG/DL (ref 0.5–1)
CREAT UR-MCNC: 99.1 MG/DL
DEPRECATED RDW RBC AUTO: 40.6 FL (ref 36.4–46.3)
EOSINOPHIL # BLD AUTO: 0.09 10*3/MM3 (ref 0–0.7)
EOSINOPHIL NFR BLD AUTO: 0.6 % (ref 0–7)
ERYTHROCYTE [DISTWIDTH] IN BLOOD BY AUTOMATED COUNT: 13.4 % (ref 11.5–14.5)
GFR SERPL CREATININE-BSD FRML MDRD: 127 ML/MIN/1.73 (ref 71–165)
GLOBULIN UR ELPH-MCNC: 2.8 GM/DL (ref 2.3–3.5)
GLUCOSE BLD-MCNC: 86 MG/DL (ref 60–100)
HCT VFR BLD AUTO: 33.2 % (ref 35–45)
HGB BLD-MCNC: 11.8 G/DL (ref 12–15.5)
IMM GRANULOCYTES # BLD AUTO: 0.1 10*3/MM3 (ref 0–0.02)
IMM GRANULOCYTES NFR BLD AUTO: 0.7 % (ref 0–0.5)
LYMPHOCYTES # BLD AUTO: 3.62 10*3/MM3 (ref 0.6–4.2)
LYMPHOCYTES NFR BLD AUTO: 24.6 % (ref 10–50)
MCH RBC QN AUTO: 29.6 PG (ref 26.5–34)
MCHC RBC AUTO-ENTMCNC: 35.5 G/DL (ref 31.4–36)
MCV RBC AUTO: 83.4 FL (ref 80–98)
MONOCYTES # BLD AUTO: 1.03 10*3/MM3 (ref 0–0.9)
MONOCYTES NFR BLD AUTO: 7 % (ref 0–12)
NEUTROPHILS # BLD AUTO: 9.87 10*3/MM3 (ref 2–8.6)
NEUTROPHILS NFR BLD AUTO: 67 % (ref 37–80)
PLATELET # BLD AUTO: 155 10*3/MM3 (ref 150–450)
PMV BLD AUTO: 10.4 FL (ref 8–12)
POTASSIUM BLD-SCNC: 3.8 MMOL/L (ref 3.5–5.1)
PROT SERPL-MCNC: 6.7 G/DL (ref 6.3–8.6)
PROT UR-MCNC: 12.6 MG/DL
PROT/CREAT UR: 127.1 MG/G CREA (ref 0–200)
RBC # BLD AUTO: 3.98 10*6/MM3 (ref 3.77–5.16)
SODIUM BLD-SCNC: 132 MMOL/L (ref 137–145)
WBC NRBC COR # BLD: 14.72 10*3/MM3 (ref 3.2–9.8)

## 2019-01-17 PROCEDURE — 80053 COMPREHEN METABOLIC PANEL: CPT

## 2019-01-17 PROCEDURE — 36415 COLL VENOUS BLD VENIPUNCTURE: CPT | Performed by: OBSTETRICS & GYNECOLOGY

## 2019-01-17 PROCEDURE — 84156 ASSAY OF PROTEIN URINE: CPT | Performed by: OBSTETRICS & GYNECOLOGY

## 2019-01-17 PROCEDURE — 85025 COMPLETE CBC W/AUTO DIFF WBC: CPT | Performed by: OBSTETRICS & GYNECOLOGY

## 2019-01-17 PROCEDURE — 0502F SUBSEQUENT PRENATAL CARE: CPT | Performed by: OBSTETRICS & GYNECOLOGY

## 2019-01-17 PROCEDURE — 82570 ASSAY OF URINE CREATININE: CPT | Performed by: OBSTETRICS & GYNECOLOGY

## 2019-01-20 PROBLEM — O13.9 GESTATIONAL HYPERTENSION WITHOUT SIGNIFICANT PROTEINURIA, ANTEPARTUM: Status: ACTIVE | Noted: 2019-01-20

## 2019-01-20 PROBLEM — Z3A.32 32 WEEKS GESTATION OF PREGNANCY: Status: ACTIVE | Noted: 2019-01-20

## 2019-01-20 NOTE — PROGRESS NOTES
Chief complaint here for prenatal care    Patient's pregnancy complicated by gestational hypertension denies headaches visual changes and epigastric pain since his blood pressure within parameters at home.  Biophysical profile liver urinary report reviewed today reviewed today..  DTRs 2+ over 4 no clonus.

## 2019-01-20 NOTE — PROGRESS NOTES
Complaint here for prenatal care    Patient denies any headaches visual changes or epigastric pain.  So far she hasn't developed proteinuria overall impression is gestational hypertension but certainly she is at risk for developing preeclampsia.  She says the baby is active.  Luis Enrique counts emphasized.  Nonstress test reactive today she says her blood pressures have been good at home

## 2019-01-21 ENCOUNTER — LAB (OUTPATIENT)
Dept: LAB | Facility: HOSPITAL | Age: 24
End: 2019-01-21

## 2019-01-21 ENCOUNTER — HOSPITAL ENCOUNTER (OUTPATIENT)
Facility: HOSPITAL | Age: 24
Setting detail: OBSERVATION
Discharge: HOME | End: 2019-01-22
Attending: OBSTETRICS & GYNECOLOGY | Admitting: OBSTETRICS & GYNECOLOGY

## 2019-01-21 ENCOUNTER — ROUTINE PRENATAL (OUTPATIENT)
Dept: OBSTETRICS AND GYNECOLOGY | Facility: CLINIC | Age: 24
End: 2019-01-21

## 2019-01-21 VITALS — SYSTOLIC BLOOD PRESSURE: 120 MMHG | WEIGHT: 207 LBS | DIASTOLIC BLOOD PRESSURE: 72 MMHG | BODY MASS INDEX: 32.42 KG/M2

## 2019-01-21 DIAGNOSIS — O13.9 GESTATIONAL HYPERTENSION WITHOUT SIGNIFICANT PROTEINURIA, ANTEPARTUM: ICD-10-CM

## 2019-01-21 DIAGNOSIS — Z3A.32 32 WEEKS GESTATION OF PREGNANCY: Primary | ICD-10-CM

## 2019-01-21 DIAGNOSIS — O14.93 PREECLAMPSIA, THIRD TRIMESTER: ICD-10-CM

## 2019-01-21 PROBLEM — O14.90 PRE-ECLAMPSIA: Status: ACTIVE | Noted: 2019-01-21

## 2019-01-21 LAB
ALBUMIN SERPL-MCNC: 3.7 G/DL (ref 3.4–4.8)
ALBUMIN/GLOB SERPL: 1.4 G/DL (ref 1.1–1.8)
ALP SERPL-CCNC: 95 U/L (ref 38–126)
ALT SERPL W P-5'-P-CCNC: 15 U/L (ref 9–52)
ANION GAP SERPL CALCULATED.3IONS-SCNC: 8 MMOL/L (ref 5–15)
AST SERPL-CCNC: 20 U/L (ref 14–36)
BILIRUB SERPL-MCNC: 0.3 MG/DL (ref 0.2–1.3)
BUN BLD-MCNC: 3 MG/DL (ref 7–21)
BUN/CREAT SERPL: 5.4 (ref 7–25)
CALCIUM SPEC-SCNC: 9.8 MG/DL (ref 8.4–10.2)
CHLORIDE SERPL-SCNC: 102 MMOL/L (ref 95–110)
CO2 SERPL-SCNC: 23 MMOL/L (ref 22–31)
CREAT BLD-MCNC: 0.56 MG/DL (ref 0.5–1)
CREAT UR-MCNC: 25.1 MG/DL
DEPRECATED RDW RBC AUTO: 41 FL (ref 36.4–46.3)
ERYTHROCYTE [DISTWIDTH] IN BLOOD BY AUTOMATED COUNT: 13.5 % (ref 11.5–14.5)
GFR SERPL CREATININE-BSD FRML MDRD: 132 ML/MIN/1.73 (ref 71–165)
GLOBULIN UR ELPH-MCNC: 2.7 GM/DL (ref 2.3–3.5)
GLUCOSE BLD-MCNC: 101 MG/DL (ref 60–100)
HCT VFR BLD AUTO: 32.7 % (ref 35–45)
HGB BLD-MCNC: 11.5 G/DL (ref 12–15.5)
MCH RBC QN AUTO: 29.3 PG (ref 26.5–34)
MCHC RBC AUTO-ENTMCNC: 35.2 G/DL (ref 31.4–36)
MCV RBC AUTO: 83.4 FL (ref 80–98)
PLATELET # BLD AUTO: 145 10*3/MM3 (ref 150–450)
PMV BLD AUTO: 10 FL (ref 8–12)
POTASSIUM BLD-SCNC: 3.6 MMOL/L (ref 3.5–5.1)
PROT SERPL-MCNC: 6.4 G/DL (ref 6.3–8.6)
PROT UR-MCNC: 19.2 MG/DL
PROT/CREAT UR: 764.9 MG/G CREA (ref 0–200)
RBC # BLD AUTO: 3.92 10*6/MM3 (ref 3.77–5.16)
SODIUM BLD-SCNC: 133 MMOL/L (ref 137–145)
WBC NRBC COR # BLD: 13.09 10*3/MM3 (ref 3.2–9.8)

## 2019-01-21 PROCEDURE — 96372 THER/PROPH/DIAG INJ SC/IM: CPT

## 2019-01-21 PROCEDURE — 82570 ASSAY OF URINE CREATININE: CPT | Performed by: OBSTETRICS & GYNECOLOGY

## 2019-01-21 PROCEDURE — G0378 HOSPITAL OBSERVATION PER HR: HCPCS

## 2019-01-21 PROCEDURE — 25010000002 BETAMETHASONE ACET & SOD PHOS PER 4 MG: Performed by: OBSTETRICS & GYNECOLOGY

## 2019-01-21 PROCEDURE — 84156 ASSAY OF PROTEIN URINE: CPT | Performed by: OBSTETRICS & GYNECOLOGY

## 2019-01-21 PROCEDURE — 36415 COLL VENOUS BLD VENIPUNCTURE: CPT

## 2019-01-21 PROCEDURE — 0502F SUBSEQUENT PRENATAL CARE: CPT | Performed by: OBSTETRICS & GYNECOLOGY

## 2019-01-21 PROCEDURE — 85027 COMPLETE CBC AUTOMATED: CPT

## 2019-01-21 PROCEDURE — 99219 PR INITIAL OBSERVATION CARE/DAY 50 MINUTES: CPT | Performed by: OBSTETRICS & GYNECOLOGY

## 2019-01-21 PROCEDURE — 80053 COMPREHEN METABOLIC PANEL: CPT

## 2019-01-21 PROCEDURE — G0379 DIRECT REFER HOSPITAL OBSERV: HCPCS

## 2019-01-21 PROCEDURE — 59025 FETAL NON-STRESS TEST: CPT

## 2019-01-21 PROCEDURE — 59025 FETAL NON-STRESS TEST: CPT | Performed by: OBSTETRICS & GYNECOLOGY

## 2019-01-21 RX ORDER — BETAMETHASONE SODIUM PHOSPHATE AND BETAMETHASONE ACETATE 3; 3 MG/ML; MG/ML
12 INJECTION, SUSPENSION INTRA-ARTICULAR; INTRALESIONAL; INTRAMUSCULAR; SOFT TISSUE EVERY 24 HOURS
Status: DISCONTINUED | OUTPATIENT
Start: 2019-01-21 | End: 2019-01-22 | Stop reason: HOSPADM

## 2019-01-21 RX ORDER — BETAMETHASONE SODIUM PHOSPHATE AND BETAMETHASONE ACETATE 3; 3 MG/ML; MG/ML
12 INJECTION, SUSPENSION INTRA-ARTICULAR; INTRALESIONAL; INTRAMUSCULAR; SOFT TISSUE EVERY 24 HOURS
Status: DISCONTINUED | OUTPATIENT
Start: 2019-01-21 | End: 2019-01-21 | Stop reason: HOSPADM

## 2019-01-21 RX ORDER — BETAMETHASONE SODIUM PHOSPHATE AND BETAMETHASONE ACETATE 3; 3 MG/ML; MG/ML
12 INJECTION, SUSPENSION INTRA-ARTICULAR; INTRALESIONAL; INTRAMUSCULAR; SOFT TISSUE EVERY 24 HOURS
Status: CANCELLED | OUTPATIENT
Start: 2019-01-21 | End: 2019-01-23

## 2019-01-21 RX ADMIN — BETAMETHASONE ACETATE AND BETAMETHASONE SODIUM PHOSPHATE 12 MG: 3; 3 INJECTION, SUSPENSION INTRA-ARTICULAR; INTRALESIONAL; INTRAMUSCULAR; SOFT TISSUE at 19:40

## 2019-01-21 NOTE — PLAN OF CARE
Problem: Patient Care Overview  Goal: Plan of Care Review  Outcome: Ongoing (interventions implemented as appropriate)   01/21/19 1754   Coping/Psychosocial   Plan of Care Reviewed With patient;spouse   Plan of Care Review   Progress no change   OTHER   Outcome Summary 24 hour urine started at 1655. Patient has labs ordered for the am. Regular diet. Betamethasone tonight and again in 24 hours. BP monitoring. NST reactive.     Goal: Individualization and Mutuality  Outcome: Ongoing (interventions implemented as appropriate)    Goal: Discharge Needs Assessment  Outcome: Ongoing (interventions implemented as appropriate)    Goal: Interprofessional Rounds/Family Conf  Outcome: Ongoing (interventions implemented as appropriate)      Problem: Prenatal Patient, Hospitalized (Adult,Obstetrics,Pediatric)  Goal: Signs and Symptoms of Listed Potential Problems Will be Absent, Minimized or Managed (Prenatal Patient, Hospitalized)  Outcome: Ongoing (interventions implemented as appropriate)

## 2019-01-22 ENCOUNTER — APPOINTMENT (OUTPATIENT)
Dept: ULTRASOUND IMAGING | Facility: HOSPITAL | Age: 24
End: 2019-01-22

## 2019-01-22 VITALS
OXYGEN SATURATION: 99 % | DIASTOLIC BLOOD PRESSURE: 75 MMHG | WEIGHT: 207 LBS | SYSTOLIC BLOOD PRESSURE: 127 MMHG | HEART RATE: 66 BPM | TEMPERATURE: 98.9 F | RESPIRATION RATE: 18 BRPM | BODY MASS INDEX: 32.49 KG/M2 | HEIGHT: 67 IN

## 2019-01-22 LAB
ABO GROUP BLD: NORMAL
ALBUMIN SERPL-MCNC: 3.5 G/DL (ref 3.4–4.8)
ALBUMIN/GLOB SERPL: 1.2 G/DL (ref 1.1–1.8)
ALP SERPL-CCNC: 105 U/L (ref 38–126)
ALT SERPL W P-5'-P-CCNC: 13 U/L (ref 9–52)
ANION GAP SERPL CALCULATED.3IONS-SCNC: 9 MMOL/L (ref 5–15)
AST SERPL-CCNC: 27 U/L (ref 14–36)
BILIRUB SERPL-MCNC: 0.3 MG/DL (ref 0.2–1.3)
BLD GP AB SCN SERPL QL: NEGATIVE
BUN BLD-MCNC: 4 MG/DL (ref 7–21)
BUN/CREAT SERPL: 8.3 (ref 7–25)
CALCIUM SPEC-SCNC: 9.7 MG/DL (ref 8.4–10.2)
CHLORIDE SERPL-SCNC: 104 MMOL/L (ref 95–110)
CO2 SERPL-SCNC: 21 MMOL/L (ref 22–31)
COLLECT DURATION TIME UR: 24 HRS
COLLECT DURATION TIME UR: 24 HRS
CREAT BLD-MCNC: 0.48 MG/DL (ref 0.5–1)
CREAT CL 24H UR+SERPL-VRATE: 182.5 ML/MIN (ref 81–134)
CREAT CL 24H UR+SERPL-VRATE: 262.8 L/24 HR
CREAT UR-MCNC: 57.6 MG/DL
CREAT UR-MCNC: 59.8 MG/DL
CREATINE 24H UR-MRATE: 1.5 G/24 HR (ref 0.8–2.8)
DEPRECATED RDW RBC AUTO: 40.5 FL (ref 36.4–46.3)
ERYTHROCYTE [DISTWIDTH] IN BLOOD BY AUTOMATED COUNT: 13.3 % (ref 11.5–14.5)
GFR SERPL CREATININE-BSD FRML MDRD: 158 ML/MIN/1.73 (ref 71–165)
GLOBULIN UR ELPH-MCNC: 3 GM/DL (ref 2.3–3.5)
GLUCOSE BLD-MCNC: 117 MG/DL (ref 60–100)
HCT VFR BLD AUTO: 32.5 % (ref 35–45)
HGB BLD-MCNC: 11.6 G/DL (ref 12–15.5)
LDH SERPL-CCNC: 342 U/L (ref 313–618)
Lab: NORMAL
MCH RBC QN AUTO: 29.7 PG (ref 26.5–34)
MCHC RBC AUTO-ENTMCNC: 35.7 G/DL (ref 31.4–36)
MCV RBC AUTO: 83.1 FL (ref 80–98)
PLATELET # BLD AUTO: 129 10*3/MM3 (ref 150–450)
PMV BLD AUTO: 10.3 FL (ref 8–12)
POTASSIUM BLD-SCNC: 4.4 MMOL/L (ref 3.5–5.1)
PROT 24H UR-MRATE: 230 MG/24HOURS (ref 0–230)
PROT SERPL-MCNC: 6.5 G/DL (ref 6.3–8.6)
PROT UR-MCNC: 9.2 MG/DL
PROT UR-MCNC: 9.3 MG/DL
PROT/CREAT UR: 161.5 MG/G CREA (ref 0–200)
RBC # BLD AUTO: 3.91 10*6/MM3 (ref 3.77–5.16)
RH BLD: POSITIVE
SODIUM BLD-SCNC: 134 MMOL/L (ref 137–145)
SPECIMEN VOL 24H UR: 2500 ML
T&S EXPIRATION DATE: NORMAL
URATE SERPL-MCNC: 4 MG/DL (ref 2.5–8.5)
WBC NRBC COR # BLD: 15.3 10*3/MM3 (ref 3.2–9.8)

## 2019-01-22 PROCEDURE — 59025 FETAL NON-STRESS TEST: CPT | Performed by: OBSTETRICS & GYNECOLOGY

## 2019-01-22 PROCEDURE — 80053 COMPREHEN METABOLIC PANEL: CPT | Performed by: OBSTETRICS & GYNECOLOGY

## 2019-01-22 PROCEDURE — 84550 ASSAY OF BLOOD/URIC ACID: CPT | Performed by: OBSTETRICS & GYNECOLOGY

## 2019-01-22 PROCEDURE — 83615 LACTATE (LD) (LDH) ENZYME: CPT | Performed by: OBSTETRICS & GYNECOLOGY

## 2019-01-22 PROCEDURE — G0463 HOSPITAL OUTPT CLINIC VISIT: HCPCS

## 2019-01-22 PROCEDURE — 84156 ASSAY OF PROTEIN URINE: CPT | Performed by: OBSTETRICS & GYNECOLOGY

## 2019-01-22 PROCEDURE — 82575 CREATININE CLEARANCE TEST: CPT | Performed by: OBSTETRICS & GYNECOLOGY

## 2019-01-22 PROCEDURE — 59025 FETAL NON-STRESS TEST: CPT

## 2019-01-22 PROCEDURE — 86901 BLOOD TYPING SEROLOGIC RH(D): CPT | Performed by: OBSTETRICS & GYNECOLOGY

## 2019-01-22 PROCEDURE — 25010000002 BETAMETHASONE ACET & SOD PHOS PER 4 MG: Performed by: OBSTETRICS & GYNECOLOGY

## 2019-01-22 PROCEDURE — 86850 RBC ANTIBODY SCREEN: CPT | Performed by: OBSTETRICS & GYNECOLOGY

## 2019-01-22 PROCEDURE — 85027 COMPLETE CBC AUTOMATED: CPT | Performed by: OBSTETRICS & GYNECOLOGY

## 2019-01-22 PROCEDURE — 81050 URINALYSIS VOLUME MEASURE: CPT | Performed by: OBSTETRICS & GYNECOLOGY

## 2019-01-22 PROCEDURE — 96372 THER/PROPH/DIAG INJ SC/IM: CPT

## 2019-01-22 PROCEDURE — 99217 PR OBSERVATION CARE DISCHARGE MANAGEMENT: CPT | Performed by: OBSTETRICS & GYNECOLOGY

## 2019-01-22 PROCEDURE — 76819 FETAL BIOPHYS PROFIL W/O NST: CPT

## 2019-01-22 PROCEDURE — 82570 ASSAY OF URINE CREATININE: CPT | Performed by: OBSTETRICS & GYNECOLOGY

## 2019-01-22 PROCEDURE — 86900 BLOOD TYPING SEROLOGIC ABO: CPT | Performed by: OBSTETRICS & GYNECOLOGY

## 2019-01-22 PROCEDURE — G0378 HOSPITAL OBSERVATION PER HR: HCPCS

## 2019-01-22 RX ADMIN — BETAMETHASONE SODIUM PHOSPHATE AND BETAMETHASONE ACETATE 12 MG: 3; 3 INJECTION, SUSPENSION INTRA-ARTICULAR; INTRALESIONAL; INTRAMUSCULAR at 20:20

## 2019-01-22 NOTE — PROGRESS NOTES
[facility] 1995  MRN: [id number]  CSN: [id number]    Hospital Day: 2  Subjective   Patient feeling well this morning. She denies any headaches, vision changes, chest pain, or leg edema. She is continuing to collect her urine. She is not having any nausea, vomiting, or abdominal pain. She denies any vaginal bleeding, uterine contractions, or loss of fluid.      Objective     Min/max vitals past 24 hours:   Temp  Min: 97.8 °F (36.6 °C)  Max: 98.3 °F (36.8 °C)  BP  Min: 120/72  Max: 144/79  Pulse  Min: 83  Max: 102  Pulse  Min: 83  Max: 102        No intake/output data recorded.    General: well developed; well nourished  no acute distress   Abdomen: no umbilical or inguinal hernias are present  no hepato-splenomegaly  fundus firm and non-tender   Pelvic: Not performed   Ext: Calves NT. No edema appreciated.      Lab Results   Component Value Date    HCGQUANT 934.99 (H) 07/10/2018    WBC 15.30 (H) 2019    HGB 11.6 (L) 2019    HCT 32.5 (L) 2019    MCV 83.1 2019     (L) 2019     (L) 2019    K 4.4 2019     2019    CO2 21.0 (L) 2019    BUN 4 (L) 2019    CREATININE 0.48 (L) 2019    GLUCOSE 117 (H) 2019    ALBUMIN 3.50 2019    CALCIUM 9.7 2019    AST 27 2019    ALT 13 2019    BILITOT 0.3 2019    LIPASE 191 2018          Assessment   1. IUP at 33w0d been followed gestational hypertension urine protein creatinine ratio has become unexpectedly positive indicating development of preeclampsia though clinically she seems to be doing fairly well         Plan   1. Continue to collect the 24 hour urine.   2. Continue to monitor BP.   3. Continue steroids.  4. Plan for BPP this morning.               This document has been electronically signed by Francis Navarrete MD on 2019 6:21 AM

## 2019-01-22 NOTE — H&P
Vidhi Moody  : 1995  MRN: [id number]  CSN: [id number]    History    Vidhi Moody is a 23 y.o.  with an Estimated Date of Delivery: 3/12/19 currently at 32w6d presenting with development of elevated urine protein creatinine ratio.  We've been following the patient for some time as an outpatient for gestational hypertension she had been doing well and in seeing her in the office today her blood pressure was good she reported her blood pressures at home had been usually good some systolics in the 140 and diastolics in the 90s no headaches visual changes and epigastric pain but her urine protein creatinine ratio had markedly increased.  In light of this I felt we needed to admit plan to give steroids given the gestation age to obtain a 24-hour urine and serial blood pressure monitoring of gone over the clinical situation and implications at length with the patient her fetal heart rate strip is reactive tonight were going get a biophysical profile in the morning       Obstetric History       T0      L0     SAB0   TAB0   Ectopic0   Molar0   Multiple0   Live Births0       # Outcome Date GA Lbr Tano/2nd Weight Sex Delivery Anes PTL Lv   3 Current            2 AB            1 AB                 Past Medical History:   Diagnosis Date   • Enlarged tonsils    • Miscarriage    • Sore throat, chronic      Past Surgical History:   Procedure Laterality Date   • DILATATION AND CURETTAGE     • SHOULDER SURGERY Right    • TONSILLECTOMY AND ADENOIDECTOMY N/A 2018    Procedure: TONSILLECTOMY AND NASOPHARYNGOSCOPY   (PLEASE CALL PAT,);  Surgeon: Severiano Marshall MD;  Location: [facility];  Service: ENT   • WISDOM TOOTH EXTRACTION         Current Facility-Administered Medications:   •  betamethasone acetate-betamethasone sodium phosphate (CELESTONE SOLUSPAN) injection 12 mg, 12 mg, Intramuscular, Q24H, Diego Tate MD    Allergies   Allergen Reactions   • Hydrocodone Nausea And  "Vomiting   • Adhesive Tape Hives   • Dilaudid [Hydromorphone] Irritability     Social History    Tobacco Use      Smoking status: Never Smoker      Smokeless tobacco: Never Used      ROS:                                                                                                                                    Neuro no history of brain tumor    HENT no history of ear tumors    Eye no history of retinal tumors    Pulmonary no history of lung tumors    Cardiac no history of cardiac tumors    GI: No history of small bowel tumors    Musculoskeletal: No history of skeletal muscle tumors    Endocrine: No history of adrenal tumors    Lymphatic: No history of Hodgkin's disease    Renal: No history of renal cancer    Physical Exam    /79   Pulse 93   Temp 98.3 °F (36.8 °C) (Oral)   Resp 20   Ht 170.2 cm (67\")   Wt 93.9 kg (207 lb 0 oz)   LMP 04/17/2018 (Within Days)   SpO2 100%   BMI 32.42 kg/m²   General: well developed; well nourished  no acute distress   Heart: regular rate and rhythm, S1, S2 normal, no murmur, click, rub or gallop   Lungs: breathing is unlabored  clear to auscultation bilaterally   Abdomen: soft, non-tender; no masses  no umbilical or inguinal hernias are present  no hepato-splenomegaly                    Extremities: extremities normal, atraumatic, no cyanosis or edema and DTRs 2+ over 4 no clonus     Prenatal Labs  Lab Results   Component Value Date    RUBELLASCRN Immune 04/14/2016    HEPBSAG Negative 08/10/2018    ABO O 08/10/2018    RH Positive 08/10/2018    ABSCRN Negative 08/10/2018    HEPCVIRUSABY Negative 08/10/2018       Last Labs  Lab Results   Component Value Date    HGB 11.5 (L) 01/21/2019     (L) 01/21/2019    AST 20 01/21/2019    ALT 15 01/21/2019    BUN 3 (L) 01/21/2019    CREATININE 0.56 01/21/2019    GLUCOSE 101 (H) 01/21/2019          Assessment   1. IUP at 32w6d been followed gestational hypertension urine protein creatinine ratio has become unexpectedly " positive indicating development of preeclampsia though clinically she seems to be doing fairly well       Plan to help sort things out were going place and Hospital get 24-hour urine ; steroids treatment plan risks benefits alternatives reviewed with patient and         This document has been electronically signed by Diego Tate MD on January 21, 2019 10:45 PM

## 2019-01-22 NOTE — NON STRESS TEST
Vidhi Moody, a  at 32w6d with an PABLO of 3/12/2019, by Ultrasound, was seen at [facility] for a nonstress test.    Chief Complaint   Patient presents with   • Proteinuria     sent from the office.       Patient Active Problem List   Diagnosis   • Bronchitis   • Non morbid obesity due to excess calories   • Migraine without aura and without status migrainosus, not intractable   • PCOS (polycystic ovarian syndrome)   • Tonsillar abscess   • Chronic tonsillitis   • Post-tonsillectomy hemorrhage   • 29 weeks gestation of pregnancy   • Acute right flank pain   • Hypertension in obstetric context, antepartum   • Gestational hypertension, third trimester   • 30 weeks gestation of pregnancy   • 32 weeks gestation of pregnancy   • Gestational hypertension without significant proteinuria, antepartum   • Pre-eclampsia       Start Time:   Stop Time:     Interpretation A  Nonstress Test Interpretation A: Reactive (190 : Chantal Aguirre, RN)

## 2019-01-22 NOTE — PLAN OF CARE
Problem: Patient Care Overview  Goal: Plan of Care Review  Outcome: Ongoing (interventions implemented as appropriate)   01/22/19 0356   Coping/Psychosocial   Plan of Care Reviewed With patient   Plan of Care Review   Progress improving   OTHER   Outcome Summary 24 hour urine started at 1655. Regular diet. Betamethasone given.      Goal: Individualization and Mutuality  Outcome: Ongoing (interventions implemented as appropriate)    Goal: Discharge Needs Assessment  Outcome: Ongoing (interventions implemented as appropriate)    Goal: Interprofessional Rounds/Family Conf  Outcome: Ongoing (interventions implemented as appropriate)      Problem: Prenatal Patient, Hospitalized (Adult,Obstetrics,Pediatric)  Goal: Signs and Symptoms of Listed Potential Problems Will be Absent, Minimized or Managed (Prenatal Patient, Hospitalized)  Outcome: Ongoing (interventions implemented as appropriate)

## 2019-01-22 NOTE — NON STRESS TEST
Vidhi Moody, a  at 33w0d with an PABLO of 3/12/2019, by Ultrasound, was seen at [facility] for a nonstress test.    Chief Complaint   Patient presents with   • Proteinuria     sent from the office.       Patient Active Problem List   Diagnosis   • Bronchitis   • Non morbid obesity due to excess calories   • Migraine without aura and without status migrainosus, not intractable   • PCOS (polycystic ovarian syndrome)   • Tonsillar abscess   • Chronic tonsillitis   • Post-tonsillectomy hemorrhage   • 29 weeks gestation of pregnancy   • Acute right flank pain   • Hypertension in obstetric context, antepartum   • Gestational hypertension, third trimester   • 30 weeks gestation of pregnancy   • 32 weeks gestation of pregnancy   • Gestational hypertension without significant proteinuria, antepartum   • Pre-eclampsia       Start Time: 1130  Stop Time: 1150    Interpretation A  Nonstress Test Interpretation A: Reactive (19 1150 : Mary Jimenez, RN)  Comments A: reviewed with CJ Stevenson RN (19 1150 : Mary Jimenez, RN)

## 2019-01-23 NOTE — DISCHARGE SUMMARY
[facility]  Vidhi Moody  : 1995  MRN: [id number]  CSN: [id number]    Discharge Summary      Date of Admission: 2019   Date of Discharge: 2019   Discharge Diagnosis: For 1 intrauterine pregnancy at 33 weeks, undelivered next preeclampsia without severe features            Brief History: Patient is a 23 y.o.who presented good been followed for gestational hypertension in the office had been doing well and her blood pressures have been generally good with some systolics in the 140s and some diastolics in the 90 had a and expel it did increase in her urine protein creatinine ratio.  We admitted her the hospital gave her steroids for biophysical profile was reassuring actually repeated a 24-hour urine that was now normal and repeat urine protein creatinine ratio was normal creatinine clearance was good.  Overall I think this represents progression of her disease with rest.  I think she does meet criteria for preeclampsia with the degree of elevation of her protein creatinine ratio not planning to repeat a urine protein studies in the future.  I think she's been reached maximal benefit from hospitalization at this point in the risk and benefits of hospitalized sedation for preeclampsia reviewed with patient and she desires outpatient management after risks benefits alternatives reviewed she'll monitor her blood pressure and symptoms at home we'll see back in the office to call for any problems in the interim              Condition at discharge: stable   Discharge Diet: Diet Instructions     Diet: Regular      Discharge Diet:  Regular         Discharge Activity: Activity Instructions     Discharge Activity Restrictions      1) No driving for while on narcotics or until seen by me in the office.   2) May shower.  3) Do not lift / push / pull more then 25 lbs.    Pelvic Rest           Discharge Medications:    Your medication list      CONTINUE taking these medications      Instructions  Last Dose Given Next Dose Due   prenatal vitamin 27-0.8 27-0.8 MG tablet tablet      Take  by mouth Daily.             Discharge Disposition: home   Follow-up:  Friday with NST          This note has been electronically signed.    Diego Tate MD  January 22, 2019  8:36 PM

## 2019-01-25 ENCOUNTER — ROUTINE PRENATAL (OUTPATIENT)
Dept: OBSTETRICS AND GYNECOLOGY | Facility: CLINIC | Age: 24
End: 2019-01-25

## 2019-01-25 VITALS — WEIGHT: 206.8 LBS | BODY MASS INDEX: 32.39 KG/M2 | SYSTOLIC BLOOD PRESSURE: 128 MMHG | DIASTOLIC BLOOD PRESSURE: 80 MMHG

## 2019-01-25 DIAGNOSIS — Z3A.33 33 WEEKS GESTATION OF PREGNANCY: ICD-10-CM

## 2019-01-25 DIAGNOSIS — O14.93 PRE-ECLAMPSIA IN THIRD TRIMESTER: Primary | ICD-10-CM

## 2019-01-25 PROCEDURE — 0502F SUBSEQUENT PRENATAL CARE: CPT | Performed by: OBSTETRICS & GYNECOLOGY

## 2019-01-26 NOTE — PROGRESS NOTES
Chief complaint for prenatal care    Patient being followed for preeclampsia denies headaches visual changes and epigastric pain biophysical profile obtained today see back on Monday strict kick counts blood pressure monitoring at home call for any blood pressure readings out of parameters are any other signs or symptoms out of parameter

## 2019-01-28 ENCOUNTER — ROUTINE PRENATAL (OUTPATIENT)
Dept: OBSTETRICS AND GYNECOLOGY | Facility: CLINIC | Age: 24
End: 2019-01-28

## 2019-01-28 ENCOUNTER — LAB (OUTPATIENT)
Dept: LAB | Facility: HOSPITAL | Age: 24
End: 2019-01-28

## 2019-01-28 VITALS — DIASTOLIC BLOOD PRESSURE: 78 MMHG | WEIGHT: 205 LBS | BODY MASS INDEX: 32.11 KG/M2 | SYSTOLIC BLOOD PRESSURE: 120 MMHG

## 2019-01-28 DIAGNOSIS — O14.93 PRE-ECLAMPSIA IN THIRD TRIMESTER: ICD-10-CM

## 2019-01-28 DIAGNOSIS — O12.13 PROTEINURIA AFFECTING PREGNANCY IN THIRD TRIMESTER: Primary | ICD-10-CM

## 2019-01-28 DIAGNOSIS — Z3A.33 33 WEEKS GESTATION OF PREGNANCY: ICD-10-CM

## 2019-01-28 DIAGNOSIS — N92.0 MENORRHAGIA WITH REGULAR CYCLE: ICD-10-CM

## 2019-01-28 DIAGNOSIS — O14.03 MILD PREECLAMPSIA, THIRD TRIMESTER: ICD-10-CM

## 2019-01-28 LAB
ALBUMIN SERPL-MCNC: 3.7 G/DL (ref 3.4–4.8)
ALBUMIN/GLOB SERPL: 1.4 G/DL (ref 1.1–1.8)
ALP SERPL-CCNC: 102 U/L (ref 38–126)
ALT SERPL W P-5'-P-CCNC: 10 U/L (ref 9–52)
ANION GAP SERPL CALCULATED.3IONS-SCNC: 8 MMOL/L (ref 5–15)
AST SERPL-CCNC: 19 U/L (ref 14–36)
BASOPHILS # BLD AUTO: 0.01 10*3/MM3 (ref 0–0.2)
BASOPHILS NFR BLD AUTO: 0.1 % (ref 0–2)
BILIRUB BLD-MCNC: NEGATIVE MG/DL
BILIRUB SERPL-MCNC: 0.3 MG/DL (ref 0.2–1.3)
BUN BLD-MCNC: 5 MG/DL (ref 7–21)
BUN/CREAT SERPL: 9.6 (ref 7–25)
CALCIUM SPEC-SCNC: 9.5 MG/DL (ref 8.4–10.2)
CHLORIDE SERPL-SCNC: 101 MMOL/L (ref 95–110)
CO2 SERPL-SCNC: 25 MMOL/L (ref 22–31)
CREAT BLD-MCNC: 0.52 MG/DL (ref 0.5–1)
DEPRECATED RDW RBC AUTO: 41.4 FL (ref 36.4–46.3)
EOSINOPHIL # BLD AUTO: 0.06 10*3/MM3 (ref 0–0.7)
EOSINOPHIL NFR BLD AUTO: 0.4 % (ref 0–7)
ERYTHROCYTE [DISTWIDTH] IN BLOOD BY AUTOMATED COUNT: 13.6 % (ref 11.5–14.5)
GFR SERPL CREATININE-BSD FRML MDRD: 144 ML/MIN/1.73 (ref 71–165)
GLOBULIN UR ELPH-MCNC: 2.6 GM/DL (ref 2.3–3.5)
GLUCOSE BLD-MCNC: 109 MG/DL (ref 60–100)
GLUCOSE UR STRIP-MCNC: NEGATIVE MG/DL
HCT VFR BLD AUTO: 33.1 % (ref 35–45)
HGB BLD-MCNC: 11.9 G/DL (ref 12–15.5)
IMM GRANULOCYTES # BLD AUTO: 0.17 10*3/MM3 (ref 0–0.02)
IMM GRANULOCYTES NFR BLD AUTO: 1.2 % (ref 0–0.5)
KETONES UR QL: NEGATIVE
LEUKOCYTE EST, POC: NEGATIVE
LYMPHOCYTES # BLD AUTO: 3.34 10*3/MM3 (ref 0.6–4.2)
LYMPHOCYTES NFR BLD AUTO: 24.3 % (ref 10–50)
MCH RBC QN AUTO: 30.2 PG (ref 26.5–34)
MCHC RBC AUTO-ENTMCNC: 36 G/DL (ref 31.4–36)
MCV RBC AUTO: 84 FL (ref 80–98)
MONOCYTES # BLD AUTO: 1.03 10*3/MM3 (ref 0–0.9)
MONOCYTES NFR BLD AUTO: 7.5 % (ref 0–12)
NEUTROPHILS # BLD AUTO: 9.13 10*3/MM3 (ref 2–8.6)
NEUTROPHILS NFR BLD AUTO: 66.5 % (ref 37–80)
NITRITE UR-MCNC: NEGATIVE MG/ML
PLATELET # BLD AUTO: 135 10*3/MM3 (ref 150–450)
PMV BLD AUTO: 10.1 FL (ref 8–12)
POTASSIUM BLD-SCNC: 3.6 MMOL/L (ref 3.5–5.1)
PROT SERPL-MCNC: 6.3 G/DL (ref 6.3–8.6)
PROT UR STRIP-MCNC: ABNORMAL MG/DL
RBC # BLD AUTO: 3.94 10*6/MM3 (ref 3.77–5.16)
RBC # UR STRIP: NEGATIVE /UL
SODIUM BLD-SCNC: 134 MMOL/L (ref 137–145)
UROBILINOGEN UR QL: NORMAL
WBC NRBC COR # BLD: 13.74 10*3/MM3 (ref 3.2–9.8)

## 2019-01-28 PROCEDURE — 0502F SUBSEQUENT PRENATAL CARE: CPT | Performed by: OBSTETRICS & GYNECOLOGY

## 2019-01-28 PROCEDURE — 36415 COLL VENOUS BLD VENIPUNCTURE: CPT | Performed by: OBSTETRICS & GYNECOLOGY

## 2019-01-28 PROCEDURE — 85025 COMPLETE CBC W/AUTO DIFF WBC: CPT | Performed by: OBSTETRICS & GYNECOLOGY

## 2019-01-28 PROCEDURE — 81002 URINALYSIS NONAUTO W/O SCOPE: CPT | Performed by: OBSTETRICS & GYNECOLOGY

## 2019-01-28 PROCEDURE — 80053 COMPREHEN METABOLIC PANEL: CPT

## 2019-01-28 NOTE — PROGRESS NOTES
Chief complaint follow-up preeclampsia    Patient denies headaches visual changes and epigastric pain since her blood pressure monitoring at home is been goodher weight has been relatively stable.  Biophysical profile is 8/8 today

## 2019-01-29 DIAGNOSIS — O14.93 PRE-ECLAMPSIA IN THIRD TRIMESTER: ICD-10-CM

## 2019-02-01 ENCOUNTER — LAB (OUTPATIENT)
Dept: LAB | Facility: HOSPITAL | Age: 24
End: 2019-02-01

## 2019-02-01 ENCOUNTER — ROUTINE PRENATAL (OUTPATIENT)
Dept: OBSTETRICS AND GYNECOLOGY | Facility: CLINIC | Age: 24
End: 2019-02-01

## 2019-02-01 VITALS — DIASTOLIC BLOOD PRESSURE: 72 MMHG | WEIGHT: 205.6 LBS | SYSTOLIC BLOOD PRESSURE: 112 MMHG | BODY MASS INDEX: 32.2 KG/M2

## 2019-02-01 DIAGNOSIS — N92.0 MENORRHAGIA WITH REGULAR CYCLE: ICD-10-CM

## 2019-02-01 DIAGNOSIS — Z23 NEED FOR TDAP VACCINATION: Primary | ICD-10-CM

## 2019-02-01 PROBLEM — Z3A.29 29 WEEKS GESTATION OF PREGNANCY: Status: RESOLVED | Noted: 2019-01-05 | Resolved: 2019-02-01

## 2019-02-01 PROBLEM — Z3A.32 32 WEEKS GESTATION OF PREGNANCY: Status: RESOLVED | Noted: 2019-01-20 | Resolved: 2019-02-01

## 2019-02-01 PROBLEM — O16.9: Status: RESOLVED | Noted: 2019-01-10 | Resolved: 2019-02-01

## 2019-02-01 PROBLEM — R10.9 ACUTE RIGHT FLANK PAIN: Status: RESOLVED | Noted: 2019-01-05 | Resolved: 2019-02-01

## 2019-02-01 PROBLEM — Z3A.30 30 WEEKS GESTATION OF PREGNANCY: Status: RESOLVED | Noted: 2019-01-13 | Resolved: 2019-02-01

## 2019-02-01 PROBLEM — J95.830 POST-TONSILLECTOMY HEMORRHAGE: Status: RESOLVED | Noted: 2018-05-17 | Resolved: 2019-02-01

## 2019-02-01 PROBLEM — J35.01 CHRONIC TONSILLITIS: Status: RESOLVED | Noted: 2018-04-30 | Resolved: 2019-02-01

## 2019-02-01 PROBLEM — J36 TONSILLAR ABSCESS: Status: RESOLVED | Noted: 2018-04-26 | Resolved: 2019-02-01

## 2019-02-01 PROBLEM — O14.90 PRE-ECLAMPSIA: Status: RESOLVED | Noted: 2019-01-21 | Resolved: 2019-02-01

## 2019-02-01 LAB
ALBUMIN SERPL-MCNC: 3.8 G/DL (ref 3.4–4.8)
ALBUMIN/GLOB SERPL: 1.2 G/DL (ref 1.1–1.8)
ALP SERPL-CCNC: 121 U/L (ref 38–126)
ALT SERPL W P-5'-P-CCNC: 20 U/L (ref 9–52)
ANION GAP SERPL CALCULATED.3IONS-SCNC: 11 MMOL/L (ref 5–15)
AST SERPL-CCNC: 21 U/L (ref 14–36)
BILIRUB SERPL-MCNC: 0.3 MG/DL (ref 0.2–1.3)
BUN BLD-MCNC: 5 MG/DL (ref 7–21)
BUN/CREAT SERPL: 9.6 (ref 7–25)
CALCIUM SPEC-SCNC: 9.9 MG/DL (ref 8.4–10.2)
CHLORIDE SERPL-SCNC: 101 MMOL/L (ref 95–110)
CO2 SERPL-SCNC: 24 MMOL/L (ref 22–31)
CREAT BLD-MCNC: 0.52 MG/DL (ref 0.5–1)
DEPRECATED RDW RBC AUTO: 41.4 FL (ref 36.4–46.3)
ERYTHROCYTE [DISTWIDTH] IN BLOOD BY AUTOMATED COUNT: 13.7 % (ref 11.5–14.5)
GFR SERPL CREATININE-BSD FRML MDRD: 144 ML/MIN/1.73 (ref 71–165)
GLOBULIN UR ELPH-MCNC: 3.3 GM/DL (ref 2.3–3.5)
GLUCOSE BLD-MCNC: 81 MG/DL (ref 60–100)
HCT VFR BLD AUTO: 34.7 % (ref 35–45)
HGB BLD-MCNC: 12.5 G/DL (ref 12–15.5)
MCH RBC QN AUTO: 30.3 PG (ref 26.5–34)
MCHC RBC AUTO-ENTMCNC: 36 G/DL (ref 31.4–36)
MCV RBC AUTO: 84 FL (ref 80–98)
PLATELET # BLD AUTO: 150 10*3/MM3 (ref 150–450)
PMV BLD AUTO: 10.4 FL (ref 8–12)
POTASSIUM BLD-SCNC: 3.8 MMOL/L (ref 3.5–5.1)
PROT SERPL-MCNC: 7.1 G/DL (ref 6.3–8.6)
RBC # BLD AUTO: 4.13 10*6/MM3 (ref 3.77–5.16)
SODIUM BLD-SCNC: 136 MMOL/L (ref 137–145)
WBC NRBC COR # BLD: 14 10*3/MM3 (ref 3.2–9.8)

## 2019-02-01 PROCEDURE — 80053 COMPREHEN METABOLIC PANEL: CPT

## 2019-02-01 PROCEDURE — 90715 TDAP VACCINE 7 YRS/> IM: CPT | Performed by: NURSE PRACTITIONER

## 2019-02-01 PROCEDURE — 85027 COMPLETE CBC AUTOMATED: CPT

## 2019-02-01 PROCEDURE — 36415 COLL VENOUS BLD VENIPUNCTURE: CPT

## 2019-02-01 PROCEDURE — 90471 IMMUNIZATION ADMIN: CPT | Performed by: NURSE PRACTITIONER

## 2019-02-01 PROCEDURE — 0502F SUBSEQUENT PRENATAL CARE: CPT | Performed by: NURSE PRACTITIONER

## 2019-02-03 PROBLEM — N92.0 MENORRHAGIA WITH REGULAR CYCLE: Status: ACTIVE | Noted: 2019-02-03

## 2019-02-03 PROBLEM — O14.03 MILD PREECLAMPSIA, THIRD TRIMESTER: Status: ACTIVE | Noted: 2019-02-03

## 2019-02-03 PROBLEM — Z3A.33 33 WEEKS GESTATION OF PREGNANCY: Status: ACTIVE | Noted: 2019-02-03

## 2019-02-04 NOTE — PROGRESS NOTES
CC: NST & LORE visit; hx reviewed, no changes    HPI: Dx with gHTN in mid-January but has been normotensive since without any HTN medications. She has been taken off of work and has had 2 doses of betamethasone. 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     ROS: Denies dysuria, vb, LOF, decreased FM, RUQ pain, headaches, visual changes or swelling in the face. She does note some intermittent BLE edema but resolves with rest.    P/E: Reactive NST. DTRs 2+. See note    A/P: 23 y.o.  @ 34w3d here for twice weekly  testing and prenatal visit.    1. High risk gestation  - Encourage twice daily FKC  - PTL precautions  - RI, HepBsAg neg, HepC neg, RPR-NR, HIV neg, GC/CT neg/neg, O POSITIVE, PLT- 150, UDS neg  - Tdap today  - RTC in 4 days for BPP    2. Hx of SAB x2  - Encouraged strict FKC    3. gHTN  - continue to monitor at home  - has been normotensive x2 weeks  - limit salt intake

## 2019-02-04 NOTE — PROGRESS NOTES
Chief complaint here for prenatal care    Being followed for preeclampsia eclampsia denies any headaches visual changes epigastric pain biophysical profile 8/8 estimated fetal weight 36 percentile AST and ALT normal lakelets 135 they have been running low felt to be gestational

## 2019-02-08 ENCOUNTER — LAB (OUTPATIENT)
Dept: LAB | Facility: HOSPITAL | Age: 24
End: 2019-02-08

## 2019-02-08 ENCOUNTER — ROUTINE PRENATAL (OUTPATIENT)
Dept: OBSTETRICS AND GYNECOLOGY | Facility: CLINIC | Age: 24
End: 2019-02-08

## 2019-02-08 VITALS — DIASTOLIC BLOOD PRESSURE: 76 MMHG | SYSTOLIC BLOOD PRESSURE: 128 MMHG | WEIGHT: 208 LBS | BODY MASS INDEX: 32.58 KG/M2

## 2019-02-08 DIAGNOSIS — Z3A.35 35 WEEKS GESTATION OF PREGNANCY: Primary | ICD-10-CM

## 2019-02-08 DIAGNOSIS — Z3A.35 35 WEEKS GESTATION OF PREGNANCY: ICD-10-CM

## 2019-02-08 DIAGNOSIS — O14.03 MILD PREECLAMPSIA, THIRD TRIMESTER: ICD-10-CM

## 2019-02-08 LAB
ALBUMIN SERPL-MCNC: 4.2 G/DL (ref 3.4–4.8)
ALBUMIN/GLOB SERPL: 1.4 G/DL (ref 1.1–1.8)
ALP SERPL-CCNC: 131 U/L (ref 38–126)
ALT SERPL W P-5'-P-CCNC: 13 U/L (ref 9–52)
ANION GAP SERPL CALCULATED.3IONS-SCNC: 11 MMOL/L (ref 5–15)
AST SERPL-CCNC: 21 U/L (ref 14–36)
BASOPHILS # BLD AUTO: 0.01 10*3/MM3 (ref 0–0.2)
BASOPHILS NFR BLD AUTO: 0.1 % (ref 0–2)
BILIRUB SERPL-MCNC: 0.4 MG/DL (ref 0.2–1.3)
BUN BLD-MCNC: 6 MG/DL (ref 7–21)
BUN/CREAT SERPL: 12.5 (ref 7–25)
CALCIUM SPEC-SCNC: 10.4 MG/DL (ref 8.4–10.2)
CHLORIDE SERPL-SCNC: 102 MMOL/L (ref 95–110)
CO2 SERPL-SCNC: 22 MMOL/L (ref 22–31)
CREAT BLD-MCNC: 0.48 MG/DL (ref 0.5–1)
DEPRECATED RDW RBC AUTO: 41.2 FL (ref 36.4–46.3)
EOSINOPHIL # BLD AUTO: 0.06 10*3/MM3 (ref 0–0.7)
EOSINOPHIL NFR BLD AUTO: 0.4 % (ref 0–7)
ERYTHROCYTE [DISTWIDTH] IN BLOOD BY AUTOMATED COUNT: 13.6 % (ref 11.5–14.5)
GFR SERPL CREATININE-BSD FRML MDRD: 158 ML/MIN/1.73 (ref 71–165)
GLOBULIN UR ELPH-MCNC: 3 GM/DL (ref 2.3–3.5)
GLUCOSE BLD-MCNC: 83 MG/DL (ref 60–100)
HCT VFR BLD AUTO: 34.5 % (ref 35–45)
HGB BLD-MCNC: 12.6 G/DL (ref 12–15.5)
IMM GRANULOCYTES # BLD AUTO: 0.06 10*3/MM3 (ref 0–0.02)
IMM GRANULOCYTES NFR BLD AUTO: 0.4 % (ref 0–0.5)
LYMPHOCYTES # BLD AUTO: 3.22 10*3/MM3 (ref 0.6–4.2)
LYMPHOCYTES NFR BLD AUTO: 21.7 % (ref 10–50)
MCH RBC QN AUTO: 30.2 PG (ref 26.5–34)
MCHC RBC AUTO-ENTMCNC: 36.5 G/DL (ref 31.4–36)
MCV RBC AUTO: 82.7 FL (ref 80–98)
MONOCYTES # BLD AUTO: 1.02 10*3/MM3 (ref 0–0.9)
MONOCYTES NFR BLD AUTO: 6.9 % (ref 0–12)
NEUTROPHILS # BLD AUTO: 10.47 10*3/MM3 (ref 2–8.6)
NEUTROPHILS NFR BLD AUTO: 70.5 % (ref 37–80)
PLATELET # BLD AUTO: 126 10*3/MM3 (ref 150–450)
PMV BLD AUTO: 10.4 FL (ref 8–12)
POTASSIUM BLD-SCNC: 4 MMOL/L (ref 3.5–5.1)
PROT SERPL-MCNC: 7.2 G/DL (ref 6.3–8.6)
RBC # BLD AUTO: 4.17 10*6/MM3 (ref 3.77–5.16)
SODIUM BLD-SCNC: 135 MMOL/L (ref 137–145)
WBC NRBC COR # BLD: 14.84 10*3/MM3 (ref 3.2–9.8)

## 2019-02-08 PROCEDURE — 87653 STREP B DNA AMP PROBE: CPT | Performed by: OBSTETRICS & GYNECOLOGY

## 2019-02-08 PROCEDURE — 85025 COMPLETE CBC W/AUTO DIFF WBC: CPT | Performed by: OBSTETRICS & GYNECOLOGY

## 2019-02-08 PROCEDURE — 36415 COLL VENOUS BLD VENIPUNCTURE: CPT | Performed by: OBSTETRICS & GYNECOLOGY

## 2019-02-08 PROCEDURE — 0502F SUBSEQUENT PRENATAL CARE: CPT | Performed by: OBSTETRICS & GYNECOLOGY

## 2019-02-08 PROCEDURE — 80053 COMPREHEN METABOLIC PANEL: CPT

## 2019-02-09 PROBLEM — Z3A.35 35 WEEKS GESTATION OF PREGNANCY: Status: ACTIVE | Noted: 2019-02-09

## 2019-02-09 LAB — GROUP B STREP, DNA: NEGATIVE

## 2019-02-09 NOTE — PROGRESS NOTES
Chief complaint here for prenatal care- follow-up preeclampsia without severe features at this point    Denies headaches visual changes epigastric pain.  Says blood pressure readings have been good at home.

## 2019-02-11 ENCOUNTER — LAB (OUTPATIENT)
Dept: LAB | Facility: HOSPITAL | Age: 24
End: 2019-02-11

## 2019-02-11 ENCOUNTER — ROUTINE PRENATAL (OUTPATIENT)
Dept: OBSTETRICS AND GYNECOLOGY | Facility: CLINIC | Age: 24
End: 2019-02-11

## 2019-02-11 VITALS — DIASTOLIC BLOOD PRESSURE: 78 MMHG | BODY MASS INDEX: 32.64 KG/M2 | SYSTOLIC BLOOD PRESSURE: 128 MMHG | WEIGHT: 208.4 LBS

## 2019-02-11 DIAGNOSIS — O14.03 MILD PREECLAMPSIA, THIRD TRIMESTER: Primary | ICD-10-CM

## 2019-02-11 DIAGNOSIS — Z3A.35 35 WEEKS GESTATION OF PREGNANCY: ICD-10-CM

## 2019-02-11 DIAGNOSIS — Z3A.36 36 WEEKS GESTATION OF PREGNANCY: ICD-10-CM

## 2019-02-11 LAB
ALBUMIN SERPL-MCNC: 3.7 G/DL (ref 3.4–4.8)
ALBUMIN/GLOB SERPL: 1.3 G/DL (ref 1.1–1.8)
ALP SERPL-CCNC: 119 U/L (ref 38–126)
ALT SERPL W P-5'-P-CCNC: <6 U/L (ref 9–52)
ANION GAP SERPL CALCULATED.3IONS-SCNC: 11 MMOL/L (ref 5–15)
AST SERPL-CCNC: 20 U/L (ref 14–36)
BILIRUB SERPL-MCNC: 0.3 MG/DL (ref 0.2–1.3)
BUN BLD-MCNC: 4 MG/DL (ref 7–21)
BUN/CREAT SERPL: 9.1 (ref 7–25)
CALCIUM SPEC-SCNC: 9.5 MG/DL (ref 8.4–10.2)
CHLORIDE SERPL-SCNC: 104 MMOL/L (ref 95–110)
CO2 SERPL-SCNC: 21 MMOL/L (ref 22–31)
CREAT BLD-MCNC: 0.44 MG/DL (ref 0.5–1)
DEPRECATED RDW RBC AUTO: 41.2 FL (ref 37–54)
ERYTHROCYTE [DISTWIDTH] IN BLOOD BY AUTOMATED COUNT: 13.5 % (ref 12.3–15.4)
GFR SERPL CREATININE-BSD FRML MDRD: 174 ML/MIN/1.73 (ref 71–165)
GLOBULIN UR ELPH-MCNC: 2.8 GM/DL (ref 2.3–3.5)
GLUCOSE BLD-MCNC: 120 MG/DL (ref 60–100)
HCT VFR BLD AUTO: 33.9 % (ref 34–46.6)
HGB BLD-MCNC: 11.6 G/DL (ref 12–15.9)
MCH RBC QN AUTO: 28.8 PG (ref 26.6–33)
MCHC RBC AUTO-ENTMCNC: 34.2 G/DL (ref 31.5–35.7)
MCV RBC AUTO: 84.1 FL (ref 79–97)
PLATELET # BLD AUTO: 126 10*3/MM3 (ref 140–450)
PMV BLD AUTO: 10.5 FL (ref 6–12)
POTASSIUM BLD-SCNC: 3.3 MMOL/L (ref 3.5–5.1)
PROT SERPL-MCNC: 6.5 G/DL (ref 6.3–8.6)
RBC # BLD AUTO: 4.03 10*6/MM3 (ref 3.77–5.28)
SODIUM BLD-SCNC: 136 MMOL/L (ref 137–145)
WBC NRBC COR # BLD: 11.24 10*3/MM3 (ref 3.4–10.8)

## 2019-02-11 PROCEDURE — 0502F SUBSEQUENT PRENATAL CARE: CPT | Performed by: OBSTETRICS & GYNECOLOGY

## 2019-02-11 PROCEDURE — 36415 COLL VENOUS BLD VENIPUNCTURE: CPT

## 2019-02-11 PROCEDURE — 85027 COMPLETE CBC AUTOMATED: CPT

## 2019-02-11 PROCEDURE — 80053 COMPREHEN METABOLIC PANEL: CPT

## 2019-02-13 DIAGNOSIS — O13.9 GESTATIONAL HYPERTENSION WITHOUT SIGNIFICANT PROTEINURIA, ANTEPARTUM: ICD-10-CM

## 2019-02-15 ENCOUNTER — LAB (OUTPATIENT)
Dept: LAB | Facility: HOSPITAL | Age: 24
End: 2019-02-15

## 2019-02-15 ENCOUNTER — ROUTINE PRENATAL (OUTPATIENT)
Dept: OBSTETRICS AND GYNECOLOGY | Facility: CLINIC | Age: 24
End: 2019-02-15

## 2019-02-15 VITALS — SYSTOLIC BLOOD PRESSURE: 134 MMHG | DIASTOLIC BLOOD PRESSURE: 82 MMHG | WEIGHT: 213.3 LBS | BODY MASS INDEX: 33.41 KG/M2

## 2019-02-15 DIAGNOSIS — O14.93 PREECLAMPSIA, THIRD TRIMESTER: Primary | ICD-10-CM

## 2019-02-15 DIAGNOSIS — O14.03 MILD PREECLAMPSIA, THIRD TRIMESTER: ICD-10-CM

## 2019-02-15 LAB
ALBUMIN SERPL-MCNC: 3.5 G/DL (ref 3.4–4.8)
ALBUMIN/GLOB SERPL: 1.1 G/DL (ref 1.1–1.8)
ALP SERPL-CCNC: 113 U/L (ref 38–126)
ALT SERPL W P-5'-P-CCNC: 8 U/L (ref 9–52)
ANION GAP SERPL CALCULATED.3IONS-SCNC: 8 MMOL/L (ref 5–15)
AST SERPL-CCNC: 19 U/L (ref 14–36)
BILIRUB SERPL-MCNC: 0.3 MG/DL (ref 0.2–1.3)
BUN BLD-MCNC: 5 MG/DL (ref 7–21)
BUN/CREAT SERPL: 9.8 (ref 7–25)
CALCIUM SPEC-SCNC: 9.4 MG/DL (ref 8.4–10.2)
CHLORIDE SERPL-SCNC: 103 MMOL/L (ref 95–110)
CO2 SERPL-SCNC: 24 MMOL/L (ref 22–31)
CREAT BLD-MCNC: 0.51 MG/DL (ref 0.5–1)
DEPRECATED RDW RBC AUTO: 40.3 FL (ref 37–54)
ERYTHROCYTE [DISTWIDTH] IN BLOOD BY AUTOMATED COUNT: 13.5 % (ref 12.3–15.4)
GFR SERPL CREATININE-BSD FRML MDRD: 147 ML/MIN/1.73 (ref 71–165)
GLOBULIN UR ELPH-MCNC: 3.1 GM/DL (ref 2.3–3.5)
GLUCOSE BLD-MCNC: 78 MG/DL (ref 60–100)
HCT VFR BLD AUTO: 32.2 % (ref 34–46.6)
HGB BLD-MCNC: 11.4 G/DL (ref 12–15.9)
MCH RBC QN AUTO: 29.5 PG (ref 26.6–33)
MCHC RBC AUTO-ENTMCNC: 35.4 G/DL (ref 31.5–35.7)
MCV RBC AUTO: 83.4 FL (ref 79–97)
PLATELET # BLD AUTO: 145 10*3/MM3 (ref 140–450)
PMV BLD AUTO: 10.6 FL (ref 6–12)
POTASSIUM BLD-SCNC: 3.9 MMOL/L (ref 3.5–5.1)
PROT SERPL-MCNC: 6.6 G/DL (ref 6.3–8.6)
RBC # BLD AUTO: 3.86 10*6/MM3 (ref 3.77–5.28)
SODIUM BLD-SCNC: 135 MMOL/L (ref 137–145)
WBC NRBC COR # BLD: 13.61 10*3/MM3 (ref 3.4–10.8)

## 2019-02-15 PROCEDURE — 36415 COLL VENOUS BLD VENIPUNCTURE: CPT

## 2019-02-15 PROCEDURE — 80053 COMPREHEN METABOLIC PANEL: CPT

## 2019-02-15 PROCEDURE — 0502F SUBSEQUENT PRENATAL CARE: CPT | Performed by: OBSTETRICS & GYNECOLOGY

## 2019-02-15 PROCEDURE — 85027 COMPLETE CBC AUTOMATED: CPT

## 2019-02-16 NOTE — PROGRESS NOTES
Chief complaint here for prenatal care    Denies any headaches visual change epigastric pain biophysical profile 8 Plast a normal fluid and estimated fetal weight 6 pounds 7 ounces at 51st percentile.  She is for every next week at 37 weeks we will plan to see her back Monday for a NST strict kick counts in the interim.  Also report any headaches visual changes or epigastric pain

## 2019-02-18 ENCOUNTER — LAB (OUTPATIENT)
Dept: LAB | Facility: HOSPITAL | Age: 24
End: 2019-02-18

## 2019-02-18 ENCOUNTER — ROUTINE PRENATAL (OUTPATIENT)
Dept: OBSTETRICS AND GYNECOLOGY | Facility: CLINIC | Age: 24
End: 2019-02-18

## 2019-02-18 VITALS — WEIGHT: 210.8 LBS | BODY MASS INDEX: 33.02 KG/M2 | DIASTOLIC BLOOD PRESSURE: 76 MMHG | SYSTOLIC BLOOD PRESSURE: 132 MMHG

## 2019-02-18 DIAGNOSIS — O14.93 PREECLAMPSIA, THIRD TRIMESTER: ICD-10-CM

## 2019-02-18 DIAGNOSIS — O14.93 PRE-ECLAMPSIA IN THIRD TRIMESTER: Primary | ICD-10-CM

## 2019-02-18 LAB
ALBUMIN SERPL-MCNC: 3.7 G/DL (ref 3.4–4.8)
ALBUMIN/GLOB SERPL: 1.1 G/DL (ref 1.1–1.8)
ALP SERPL-CCNC: 122 U/L (ref 38–126)
ALT SERPL W P-5'-P-CCNC: <6 U/L (ref 9–52)
ANION GAP SERPL CALCULATED.3IONS-SCNC: 10 MMOL/L (ref 5–15)
AST SERPL-CCNC: 18 U/L (ref 14–36)
BILIRUB SERPL-MCNC: 0.3 MG/DL (ref 0.2–1.3)
BUN BLD-MCNC: 4 MG/DL (ref 7–21)
BUN/CREAT SERPL: 7.3 (ref 7–25)
CALCIUM SPEC-SCNC: 9.7 MG/DL (ref 8.4–10.2)
CHLORIDE SERPL-SCNC: 103 MMOL/L (ref 95–110)
CO2 SERPL-SCNC: 22 MMOL/L (ref 22–31)
CREAT BLD-MCNC: 0.55 MG/DL (ref 0.5–1)
DEPRECATED RDW RBC AUTO: 41 FL (ref 37–54)
ERYTHROCYTE [DISTWIDTH] IN BLOOD BY AUTOMATED COUNT: 13.4 % (ref 12.3–15.4)
GFR SERPL CREATININE-BSD FRML MDRD: 135 ML/MIN/1.73 (ref 71–165)
GLOBULIN UR ELPH-MCNC: 3.3 GM/DL (ref 2.3–3.5)
GLUCOSE BLD-MCNC: 109 MG/DL (ref 60–100)
HCT VFR BLD AUTO: 34.8 % (ref 34–46.6)
HGB BLD-MCNC: 11.9 G/DL (ref 12–15.9)
MCH RBC QN AUTO: 28.9 PG (ref 26.6–33)
MCHC RBC AUTO-ENTMCNC: 34.2 G/DL (ref 31.5–35.7)
MCV RBC AUTO: 84.5 FL (ref 79–97)
PLATELET # BLD AUTO: 159 10*3/MM3 (ref 140–450)
PMV BLD AUTO: 10.9 FL (ref 6–12)
POTASSIUM BLD-SCNC: 4.3 MMOL/L (ref 3.5–5.1)
PROT SERPL-MCNC: 7 G/DL (ref 6.3–8.6)
RBC # BLD AUTO: 4.12 10*6/MM3 (ref 3.77–5.28)
SODIUM BLD-SCNC: 135 MMOL/L (ref 137–145)
WBC NRBC COR # BLD: 12.31 10*3/MM3 (ref 3.4–10.8)

## 2019-02-18 PROCEDURE — 80053 COMPREHEN METABOLIC PANEL: CPT

## 2019-02-18 PROCEDURE — 85027 COMPLETE CBC AUTOMATED: CPT

## 2019-02-18 PROCEDURE — 36415 COLL VENOUS BLD VENIPUNCTURE: CPT

## 2019-02-18 PROCEDURE — 0502F SUBSEQUENT PRENATAL CARE: CPT | Performed by: OBSTETRICS & GYNECOLOGY

## 2019-02-18 RX ORDER — PROMETHAZINE HYDROCHLORIDE 25 MG/ML
6.25 INJECTION, SOLUTION INTRAMUSCULAR; INTRAVENOUS
Status: CANCELLED | OUTPATIENT
Start: 2019-02-18

## 2019-02-18 RX ORDER — OXYTOCIN/0.9 % SODIUM CHLORIDE 30/500 ML
2-30 PLASTIC BAG, INJECTION (ML) INTRAVENOUS
Status: CANCELLED | OUTPATIENT
Start: 2019-02-18

## 2019-02-18 RX ORDER — PROMETHAZINE HYDROCHLORIDE 25 MG/ML
12.5 INJECTION, SOLUTION INTRAMUSCULAR; INTRAVENOUS
Status: CANCELLED | OUTPATIENT
Start: 2019-02-18

## 2019-02-18 RX ORDER — PROMETHAZINE HYDROCHLORIDE 25 MG/1
12.5 SUPPOSITORY RECTAL
Status: CANCELLED | OUTPATIENT
Start: 2019-02-18

## 2019-02-18 RX ORDER — DEXTROSE, SODIUM CHLORIDE, SODIUM LACTATE, POTASSIUM CHLORIDE, AND CALCIUM CHLORIDE 5; .6; .31; .03; .02 G/100ML; G/100ML; G/100ML; G/100ML; G/100ML
125 INJECTION, SOLUTION INTRAVENOUS CONTINUOUS
Status: CANCELLED | OUTPATIENT
Start: 2019-02-18

## 2019-02-18 RX ORDER — BUTORPHANOL TARTRATE 1 MG/ML
1 INJECTION, SOLUTION INTRAMUSCULAR; INTRAVENOUS
Status: CANCELLED | OUTPATIENT
Start: 2019-02-18

## 2019-02-18 RX ORDER — MISOPROSTOL 100 UG/1
800 TABLET ORAL AS NEEDED
Status: CANCELLED | OUTPATIENT
Start: 2019-02-18

## 2019-02-18 RX ORDER — PROMETHAZINE HYDROCHLORIDE 25 MG/1
12.5 TABLET ORAL
Status: CANCELLED | OUTPATIENT
Start: 2019-02-18

## 2019-02-18 RX ORDER — LIDOCAINE HYDROCHLORIDE 10 MG/ML
5 INJECTION, SOLUTION EPIDURAL; INFILTRATION; INTRACAUDAL; PERINEURAL AS NEEDED
Status: CANCELLED | OUTPATIENT
Start: 2019-02-18

## 2019-02-18 RX ORDER — BUTORPHANOL TARTRATE 1 MG/ML
2 INJECTION, SOLUTION INTRAMUSCULAR; INTRAVENOUS
Status: CANCELLED | OUTPATIENT
Start: 2019-02-18

## 2019-02-18 RX ORDER — SODIUM CHLORIDE 0.9 % (FLUSH) 0.9 %
3 SYRINGE (ML) INJECTION EVERY 12 HOURS SCHEDULED
Status: CANCELLED | OUTPATIENT
Start: 2019-02-18

## 2019-02-18 RX ORDER — METHYLERGONOVINE MALEATE 0.2 MG/ML
200 INJECTION INTRAVENOUS ONCE AS NEEDED
Status: CANCELLED | OUTPATIENT
Start: 2019-02-18

## 2019-02-18 RX ORDER — CARBOPROST TROMETHAMINE 250 UG/ML
250 INJECTION, SOLUTION INTRAMUSCULAR AS NEEDED
Status: CANCELLED | OUTPATIENT
Start: 2019-02-18

## 2019-02-18 RX ORDER — SODIUM CHLORIDE 0.9 % (FLUSH) 0.9 %
3-10 SYRINGE (ML) INJECTION AS NEEDED
Status: CANCELLED | OUTPATIENT
Start: 2019-02-18

## 2019-02-18 NOTE — H&P
Obstetric History and Physical    Chief Complaint   Patient presents with   • Non-stress Test     Chief complaint preeclampsia      Patient is a 23 y.o. female  currently at 36w6d, who presents with preeclampsia without severe features been followed conservatively for some time.  Approaching 37 weeks.  After reviewing risks benefits alternatives we will plan for induction at 37 weeks.  Unfortunately cervix is not terribly favorable fingertip soft mid -1.  We are going to plan for admission tomorrow and either Cervidil or Cytotec depending on cervix at that time.  Risks benefits alternatives reviewed including risk of hypertonic contractions damage mother baby risk of iatrogenic section.Both the short term and long-term risk of  section are reviewed at length.  Short-term risk of bleeding infection problems with wound healing damage to bowel bladder or ureter.  Small but real risk of maternal mortality is reviewed and understood.    Long-term risk include in future pregnancies accreta abruption uterine rupture and stillbirth among others might be ameliorated by a .  The patient and her family have been given opportunity to ask questions and these questions have been answered to their satisfaction.        The following portions of the patients history were reviewed and updated as appropriate: current medications, allergies, past medical history, past surgical history, past family history, past social history and problem list .       Prenatal Information:  Prenatal Results     Initial Prenatal Labs     Test Value Reference Range Date Time    Hemoglobin 12.9 g/dL 12.0 - 15.5 g/dL 08/10/18 1130    Hematocrit 36.6 % 35.0 - 45.0 % 08/10/18 1130    Platelets 159 10*3/mm3 140 - 450 10*3/mm3 19 0840    Rubella IgG 28.6 IU/mL 0.0 - 9.9 IU/mL 08/10/18 1130      Immune  Immune 08/10/18 1130    Hepatitis B SAg Negative  Negative 08/10/18 1130    Hepatitis C Ab Negative  Negative 08/10/18 1130    RPR  Non-Reactive  Non-Reactive 08/10/18 1130    ABO O   01/22/19 0517    Rh Positive   01/22/19 0517    Antibody Screen Negative   08/10/18 1130    HIV Negative  Negative 08/10/18 1130    Urine Culture >100,000 CFU/mL Lactobacillus species   03/08/17 1733    Gonorrhea Negative  Negative 12/27/18 1556    Chlamydia Negative  Negative 12/27/18 1556    TSH 0.900 mIU/mL 0.460 - 4.680 mIU/mL 08/10/18 1130          2nd and 3rd Trimester     Test Value Reference Range Date Time    Hemoglobin (repeated) 11.9 g/dL 12.0 - 15.9 g/dL 02/18/19 0840    Hematocrit (repeated) 34.8 % 34.0 - 46.6 % 02/18/19 0840    GCT 82 mg/dL 60 - 140 mg/dL 11/26/18 0930    Antibody Screen (repeated) Negative   01/22/19 0517    GTT Fasting        GTT 1 Hr        GTT 2 Hr        GTT 3 Hr        Group B Strep Negative  Negative 02/08/19 1010          Drug Screening     Test Value Reference Range Date Time    Amphetamine Screen Negative  Negative 11/26/18 0848    Barbiturate Screen Negative  Negative 11/26/18 0848    Benzodiazepine Screen Negative  Negative 11/26/18 0848    Methadone Screen Negative  Negative 11/26/18 0848    Phencyclidine Screen        Opiates Screen Negative  Negative 11/26/18 0848    THC Screen Negative  Negative 11/26/18 0848    Cocaine Screen Negative  Negative 11/26/18 0848    Propoxyphene Screen        Buprenorphine Screen        Methamphetamine Screen        Oxycodone Screen Negative  Negative 11/26/18 0848    Tricyclic Antidepressants Screen              Other (Risk screening)     Test Value Reference Range Date Time    Varicella IgG        Parvovirus IgG        CMV IgG        Cystic Fibrosis        Hemoglobin electrophoresis        NIPT        MSAFP-4        AFP (for NTD only)                  External Prenatal Results     Pregnancy Outside Results - Transcribed From Office Records - See Scanned Records For Details     Test Value Date Time    Hgb 11.9 g/dL 02/18/19 0840    Hct 34.8 % 02/18/19 0840    ABO O  01/22/19 0517    Rh  Positive  19 0517    Antibody Screen Negative  19 0517    Glucose Fasting GTT       Glucose Tolerance Test 1 hour       Glucose Tolerance Test 3 hour       Gonorrhea (discrete) Negative  18 1556    Chlamydia (discrete) Negative  18 1556    RPR Non-Reactive  08/10/18 1130    VDRL       Syphilis Antibody       Rubella 28.6 IU/mL 08/10/18 1130      Immune  08/10/18 1130    HBsAg Negative  08/10/18 1130    Herpes Simplex Virus PCR       Herpes Simplex VIrus Culture       HIV Negative  08/10/18 1130    Hep C RNA Quant PCR       Hep C Antibody Negative  08/10/18 1130    AFP       Group B Strep Negative  19 1010    GBS Susceptibility to Clindamycin       GBS Susceptibility to Erythromycin       Fetal Fibronectin       Genetic Testing, Maternal Blood             Drug Screening     Test Value Date Time    Urine Drug Screen       Amphetamine Screen Negative  18 0848    Barbiturate Screen Negative  18 0848    Benzodiazepine Screen Negative  18 0848    Methadone Screen Negative  18 0848    Phencyclidine Screen       Opiates Screen Negative  18 0848    THC Screen Negative  18 0848    Cocaine Screen       Propoxyphene Screen       Buprenorphine Screen       Methamphetamine Screen       Oxycodone Screen Negative  18 0848    Tricyclic Antidepressants Screen                    Past OB History:     Obstetric History       T0      L0     SAB0   TAB0   Ectopic0   Molar0   Multiple0   Live Births0       # Outcome Date GA Lbr Tano/2nd Weight Sex Delivery Anes PTL Lv   3 Current            2 AB            1 AB                    ALLERGIES:     Allergies   Allergen Reactions   • Hydrocodone Nausea And Vomiting   • Adhesive Tape Hives   • Dilaudid [Hydromorphone] Irritability        Home Medications:     Prior to Admission medications    Medication Sig Start Date End Date Taking? Authorizing Provider   Prenatal Vit-Fe Fumarate-FA (PRENATAL VITAMIN 27-0.8)  27-0.8 MG tablet tablet Take  by mouth Daily.    Provider, MD Emilie       Past Medical History: Past Medical History:   Diagnosis Date   • Enlarged tonsils    • Miscarriage    • Sore throat, chronic       Past Surgical History Past Surgical History:   Procedure Laterality Date   • DILATATION AND CURETTAGE     • SHOULDER SURGERY Right    • TONSILLECTOMY AND ADENOIDECTOMY N/A 5/11/2018    Procedure: TONSILLECTOMY AND NASOPHARYNGOSCOPY   (PLEASE CALL PAT,);  Surgeon: Severiano Marshall MD;  Location: [facility];  Service: ENT   • WISDOM TOOTH EXTRACTION        Family History: Family History   Problem Relation Age of Onset   • Hypertension Mother    • Hypertension Maternal Grandfather    • Hyperlipidemia Maternal Grandfather    • Heart disease Maternal Grandfather    • Breast cancer Paternal Grandmother 50   • Colon cancer Paternal Uncle 50   • Diabetes Paternal Aunt         unsure which type or age of onset      Social History:  reports that  has never smoked. she has never used smokeless tobacco.   reports that she drinks alcohol.   reports that she does not use drugs.        ROS:                                                                                                                                  Neuro no history of brain tumor    HENT no history of ear tumors    Eye no history of retinal tumors    Pulmonary no history of lung tumors    Cardiac no history of cardiac tumors    GI: No history of small bowel tumors    Musculoskeletal: No history of skeletal muscle tumors    Endocrine: No history of adrenal tumors    Lymphatic: No history of Hodgkin's disease    Renal: No history of renal cancer    Objective       Vital Signs Range for the last 24 hours  Temperature:     Temp Source:     BP: BP: (132)/(76) 132/76   Pulse:     Respirations:     SPO2:     O2 Amount (l/min):     O2 Devices     Weight: Weight:  [95.6 kg (210 lb 12.8 oz)] 95.6 kg (210 lb 12.8 oz)       OBGyn Exam see abovr                              Assessment/Plan         Assessment:  1. Intrauterine pregnancy at 37-0/7 weeks    GBS status: Results in Past 30 Days  Result Component Current Result Ref Range Previous Result Ref Range   Group B Strep, DNA Negative (2/8/2019) Negative Not in Time Range        Plan:  1. Admit to labor and delivery.  2. Plan of care has been reviewed with staff, patient, and   3. Risks, benefits of treatment plan have been discussed.  4. All questions have been answered.  5. Cervidil or Cytotec followed by Pitocin        This document has been electronically signed by Diego Tate MD on February 18, 2019 9:59 AM

## 2019-02-18 NOTE — H&P (VIEW-ONLY)
Obstetric History and Physical    Chief Complaint   Patient presents with   • Non-stress Test     Chief complaint preeclampsia      Patient is a 23 y.o. female  currently at 36w6d, who presents with preeclampsia without severe features been followed conservatively for some time.  Approaching 37 weeks.  After reviewing risks benefits alternatives we will plan for induction at 37 weeks.  Unfortunately cervix is not terribly favorable fingertip soft mid -1.  We are going to plan for admission tomorrow and either Cervidil or Cytotec depending on cervix at that time.  Risks benefits alternatives reviewed including risk of hypertonic contractions damage mother baby risk of iatrogenic section.Both the short term and long-term risk of  section are reviewed at length.  Short-term risk of bleeding infection problems with wound healing damage to bowel bladder or ureter.  Small but real risk of maternal mortality is reviewed and understood.    Long-term risk include in future pregnancies accreta abruption uterine rupture and stillbirth among others might be ameliorated by a .  The patient and her family have been given opportunity to ask questions and these questions have been answered to their satisfaction.        The following portions of the patients history were reviewed and updated as appropriate: current medications, allergies, past medical history, past surgical history, past family history, past social history and problem list .       Prenatal Information:  Prenatal Results     Initial Prenatal Labs     Test Value Reference Range Date Time    Hemoglobin 12.9 g/dL 12.0 - 15.5 g/dL 08/10/18 1130    Hematocrit 36.6 % 35.0 - 45.0 % 08/10/18 1130    Platelets 159 10*3/mm3 140 - 450 10*3/mm3 19 0840    Rubella IgG 28.6 IU/mL 0.0 - 9.9 IU/mL 08/10/18 1130      Immune  Immune 08/10/18 1130    Hepatitis B SAg Negative  Negative 08/10/18 1130    Hepatitis C Ab Negative  Negative 08/10/18 1130    RPR  Non-Reactive  Non-Reactive 08/10/18 1130    ABO O   01/22/19 0517    Rh Positive   01/22/19 0517    Antibody Screen Negative   08/10/18 1130    HIV Negative  Negative 08/10/18 1130    Urine Culture >100,000 CFU/mL Lactobacillus species   03/08/17 1733    Gonorrhea Negative  Negative 12/27/18 1556    Chlamydia Negative  Negative 12/27/18 1556    TSH 0.900 mIU/mL 0.460 - 4.680 mIU/mL 08/10/18 1130          2nd and 3rd Trimester     Test Value Reference Range Date Time    Hemoglobin (repeated) 11.9 g/dL 12.0 - 15.9 g/dL 02/18/19 0840    Hematocrit (repeated) 34.8 % 34.0 - 46.6 % 02/18/19 0840    GCT 82 mg/dL 60 - 140 mg/dL 11/26/18 0930    Antibody Screen (repeated) Negative   01/22/19 0517    GTT Fasting        GTT 1 Hr        GTT 2 Hr        GTT 3 Hr        Group B Strep Negative  Negative 02/08/19 1010          Drug Screening     Test Value Reference Range Date Time    Amphetamine Screen Negative  Negative 11/26/18 0848    Barbiturate Screen Negative  Negative 11/26/18 0848    Benzodiazepine Screen Negative  Negative 11/26/18 0848    Methadone Screen Negative  Negative 11/26/18 0848    Phencyclidine Screen        Opiates Screen Negative  Negative 11/26/18 0848    THC Screen Negative  Negative 11/26/18 0848    Cocaine Screen Negative  Negative 11/26/18 0848    Propoxyphene Screen        Buprenorphine Screen        Methamphetamine Screen        Oxycodone Screen Negative  Negative 11/26/18 0848    Tricyclic Antidepressants Screen              Other (Risk screening)     Test Value Reference Range Date Time    Varicella IgG        Parvovirus IgG        CMV IgG        Cystic Fibrosis        Hemoglobin electrophoresis        NIPT        MSAFP-4        AFP (for NTD only)                  External Prenatal Results     Pregnancy Outside Results - Transcribed From Office Records - See Scanned Records For Details     Test Value Date Time    Hgb 11.9 g/dL 02/18/19 0840    Hct 34.8 % 02/18/19 0840    ABO O  01/22/19 0517    Rh  Positive  19 0517    Antibody Screen Negative  19 0517    Glucose Fasting GTT       Glucose Tolerance Test 1 hour       Glucose Tolerance Test 3 hour       Gonorrhea (discrete) Negative  18 1556    Chlamydia (discrete) Negative  18 1556    RPR Non-Reactive  08/10/18 1130    VDRL       Syphilis Antibody       Rubella 28.6 IU/mL 08/10/18 1130      Immune  08/10/18 1130    HBsAg Negative  08/10/18 1130    Herpes Simplex Virus PCR       Herpes Simplex VIrus Culture       HIV Negative  08/10/18 1130    Hep C RNA Quant PCR       Hep C Antibody Negative  08/10/18 1130    AFP       Group B Strep Negative  19 1010    GBS Susceptibility to Clindamycin       GBS Susceptibility to Erythromycin       Fetal Fibronectin       Genetic Testing, Maternal Blood             Drug Screening     Test Value Date Time    Urine Drug Screen       Amphetamine Screen Negative  18 0848    Barbiturate Screen Negative  18 0848    Benzodiazepine Screen Negative  18 0848    Methadone Screen Negative  18 0848    Phencyclidine Screen       Opiates Screen Negative  18 0848    THC Screen Negative  18 0848    Cocaine Screen       Propoxyphene Screen       Buprenorphine Screen       Methamphetamine Screen       Oxycodone Screen Negative  18 0848    Tricyclic Antidepressants Screen                    Past OB History:     Obstetric History       T0      L0     SAB0   TAB0   Ectopic0   Molar0   Multiple0   Live Births0       # Outcome Date GA Lbr Tano/2nd Weight Sex Delivery Anes PTL Lv   3 Current            2 AB            1 AB                    ALLERGIES:     Allergies   Allergen Reactions   • Hydrocodone Nausea And Vomiting   • Adhesive Tape Hives   • Dilaudid [Hydromorphone] Irritability        Home Medications:     Prior to Admission medications    Medication Sig Start Date End Date Taking? Authorizing Provider   Prenatal Vit-Fe Fumarate-FA (PRENATAL VITAMIN 27-0.8)  27-0.8 MG tablet tablet Take  by mouth Daily.    Provider, MD Emilie       Past Medical History: Past Medical History:   Diagnosis Date   • Enlarged tonsils    • Miscarriage    • Sore throat, chronic       Past Surgical History Past Surgical History:   Procedure Laterality Date   • DILATATION AND CURETTAGE     • SHOULDER SURGERY Right    • TONSILLECTOMY AND ADENOIDECTOMY N/A 5/11/2018    Procedure: TONSILLECTOMY AND NASOPHARYNGOSCOPY   (PLEASE CALL PAT,);  Surgeon: Severiano Marshall MD;  Location: [facility];  Service: ENT   • WISDOM TOOTH EXTRACTION        Family History: Family History   Problem Relation Age of Onset   • Hypertension Mother    • Hypertension Maternal Grandfather    • Hyperlipidemia Maternal Grandfather    • Heart disease Maternal Grandfather    • Breast cancer Paternal Grandmother 50   • Colon cancer Paternal Uncle 50   • Diabetes Paternal Aunt         unsure which type or age of onset      Social History:  reports that  has never smoked. she has never used smokeless tobacco.   reports that she drinks alcohol.   reports that she does not use drugs.        ROS:                                                                                                                                  Neuro no history of brain tumor    HENT no history of ear tumors    Eye no history of retinal tumors    Pulmonary no history of lung tumors    Cardiac no history of cardiac tumors    GI: No history of small bowel tumors    Musculoskeletal: No history of skeletal muscle tumors    Endocrine: No history of adrenal tumors    Lymphatic: No history of Hodgkin's disease    Renal: No history of renal cancer    Objective       Vital Signs Range for the last 24 hours  Temperature:     Temp Source:     BP: BP: (132)/(76) 132/76   Pulse:     Respirations:     SPO2:     O2 Amount (l/min):     O2 Devices     Weight: Weight:  [95.6 kg (210 lb 12.8 oz)] 95.6 kg (210 lb 12.8 oz)       OBGyn Exam see abovr                                Assessment/Plan         Assessment:  1. Intrauterine pregnancy at 37-0/7 weeks    GBS status: Results in Past 30 Days  Result Component Current Result Ref Range Previous Result Ref Range   Group B Strep, DNA Negative (2/8/2019) Negative Not in Time Range        Plan:  1. Admit to labor and delivery.  2. Plan of care has been reviewed with staff, patient, and   3. Risks, benefits of treatment plan have been discussed.  4. All questions have been answered.  5. Cervidil or Cytotec followed by Pitocin        This document has been electronically signed by Diego Tate MD on February 18, 2019 9:59 AM

## 2019-02-18 NOTE — PROGRESS NOTES
CC here for prenatal care patient denies any headaches visual changes or epigastric pain.  Her lab work is reviewed with her.  Platelets 126 think this is gestational

## 2019-02-19 ENCOUNTER — HOSPITAL ENCOUNTER (INPATIENT)
Facility: HOSPITAL | Age: 24
LOS: 3 days | Discharge: HOME | End: 2019-02-22
Attending: OBSTETRICS & GYNECOLOGY | Admitting: OBSTETRICS & GYNECOLOGY

## 2019-02-19 ENCOUNTER — HOSPITAL ENCOUNTER (OUTPATIENT)
Dept: LABOR AND DELIVERY | Facility: HOSPITAL | Age: 24
Discharge: HOME | End: 2019-02-19

## 2019-02-19 DIAGNOSIS — O14.93 PRE-ECLAMPSIA IN THIRD TRIMESTER: ICD-10-CM

## 2019-02-19 PROBLEM — O14.90 PREECLAMPSIA: Status: ACTIVE | Noted: 2019-02-19

## 2019-02-19 LAB
ABO GROUP BLD: NORMAL
ALBUMIN SERPL-MCNC: 3.6 G/DL (ref 3.4–4.8)
ALBUMIN/GLOB SERPL: 1.4 G/DL (ref 1.1–1.8)
ALP SERPL-CCNC: 126 U/L (ref 38–126)
ALT SERPL W P-5'-P-CCNC: 10 U/L (ref 9–52)
AMPHET+METHAMPHET UR QL: NEGATIVE
ANION GAP SERPL CALCULATED.3IONS-SCNC: 8 MMOL/L (ref 5–15)
AST SERPL-CCNC: 21 U/L (ref 14–36)
BARBITURATES UR QL SCN: NEGATIVE
BENZODIAZ UR QL SCN: NEGATIVE
BILIRUB SERPL-MCNC: 0.4 MG/DL (ref 0.2–1.3)
BLD GP AB SCN SERPL QL: NEGATIVE
BUN BLD-MCNC: 4 MG/DL (ref 7–21)
BUN/CREAT SERPL: 9.1 (ref 7–25)
CALCIUM SPEC-SCNC: 9.6 MG/DL (ref 8.4–10.2)
CANNABINOIDS SERPL QL: NEGATIVE
CHLORIDE SERPL-SCNC: 100 MMOL/L (ref 95–110)
CO2 SERPL-SCNC: 22 MMOL/L (ref 22–31)
COCAINE UR QL: NEGATIVE
CREAT BLD-MCNC: 0.44 MG/DL (ref 0.5–1)
DEPRECATED RDW RBC AUTO: 38.9 FL (ref 37–54)
ERYTHROCYTE [DISTWIDTH] IN BLOOD BY AUTOMATED COUNT: 13.2 % (ref 12.3–15.4)
GFR SERPL CREATININE-BSD FRML MDRD: 174 ML/MIN/1.73 (ref 71–165)
GLOBULIN UR ELPH-MCNC: 2.6 GM/DL (ref 2.3–3.5)
GLUCOSE BLD-MCNC: 103 MG/DL (ref 60–100)
HCT VFR BLD AUTO: 30.3 % (ref 34–46.6)
HGB BLD-MCNC: 10.7 G/DL (ref 12–15.9)
LDH SERPL-CCNC: 637 U/L (ref 313–618)
Lab: NORMAL
MCH RBC QN AUTO: 29.1 PG (ref 26.6–33)
MCHC RBC AUTO-ENTMCNC: 35.3 G/DL (ref 31.5–35.7)
MCV RBC AUTO: 82.3 FL (ref 79–97)
METHADONE UR QL SCN: NEGATIVE
OPIATES UR QL: NEGATIVE
OXYCODONE UR QL SCN: NEGATIVE
PLATELET # BLD AUTO: 142 10*3/MM3 (ref 140–450)
PMV BLD AUTO: 10.8 FL (ref 6–12)
POTASSIUM BLD-SCNC: 3.5 MMOL/L (ref 3.5–5.1)
PROT SERPL-MCNC: 6.2 G/DL (ref 6.3–8.6)
RBC # BLD AUTO: 3.68 10*6/MM3 (ref 3.77–5.28)
RH BLD: POSITIVE
SODIUM BLD-SCNC: 130 MMOL/L (ref 137–145)
T&S EXPIRATION DATE: NORMAL
URATE SERPL-MCNC: 4.7 MG/DL (ref 2.5–8.5)
WBC NRBC COR # BLD: 14.77 10*3/MM3 (ref 3.4–10.8)

## 2019-02-19 PROCEDURE — 80307 DRUG TEST PRSMV CHEM ANLYZR: CPT | Performed by: OBSTETRICS & GYNECOLOGY

## 2019-02-19 PROCEDURE — 86901 BLOOD TYPING SEROLOGIC RH(D): CPT | Performed by: OBSTETRICS & GYNECOLOGY

## 2019-02-19 PROCEDURE — 80053 COMPREHEN METABOLIC PANEL: CPT | Performed by: OBSTETRICS & GYNECOLOGY

## 2019-02-19 PROCEDURE — 86850 RBC ANTIBODY SCREEN: CPT | Performed by: OBSTETRICS & GYNECOLOGY

## 2019-02-19 PROCEDURE — 85027 COMPLETE CBC AUTOMATED: CPT | Performed by: OBSTETRICS & GYNECOLOGY

## 2019-02-19 PROCEDURE — 3E0P7VZ INTRODUCTION OF HORMONE INTO FEMALE REPRODUCTIVE, VIA NATURAL OR ARTIFICIAL OPENING: ICD-10-PCS | Performed by: OBSTETRICS & GYNECOLOGY

## 2019-02-19 PROCEDURE — 84550 ASSAY OF BLOOD/URIC ACID: CPT | Performed by: OBSTETRICS & GYNECOLOGY

## 2019-02-19 PROCEDURE — 86900 BLOOD TYPING SEROLOGIC ABO: CPT | Performed by: OBSTETRICS & GYNECOLOGY

## 2019-02-19 PROCEDURE — 83615 LACTATE (LD) (LDH) ENZYME: CPT | Performed by: OBSTETRICS & GYNECOLOGY

## 2019-02-19 RX ORDER — PROMETHAZINE HYDROCHLORIDE 25 MG/ML
12.5 INJECTION, SOLUTION INTRAMUSCULAR; INTRAVENOUS
Status: DISCONTINUED | OUTPATIENT
Start: 2019-02-19 | End: 2019-02-20

## 2019-02-19 RX ORDER — SODIUM CHLORIDE 0.9 % (FLUSH) 0.9 %
3 SYRINGE (ML) INJECTION EVERY 12 HOURS SCHEDULED
Status: DISCONTINUED | OUTPATIENT
Start: 2019-02-19 | End: 2019-02-20

## 2019-02-19 RX ORDER — PROMETHAZINE HYDROCHLORIDE 25 MG/ML
6.25 INJECTION, SOLUTION INTRAMUSCULAR; INTRAVENOUS
Status: DISCONTINUED | OUTPATIENT
Start: 2019-02-19 | End: 2019-02-20

## 2019-02-19 RX ORDER — MAGNESIUM SULFATE HEPTAHYDRATE 40 MG/ML
2 INJECTION, SOLUTION INTRAVENOUS CONTINUOUS
Status: DISCONTINUED | OUTPATIENT
Start: 2019-02-19 | End: 2019-02-20

## 2019-02-19 RX ORDER — OXYTOCIN/0.9 % SODIUM CHLORIDE 30/500 ML
PLASTIC BAG, INJECTION (ML) INTRAVENOUS
Status: COMPLETED
Start: 2019-02-19 | End: 2019-02-19

## 2019-02-19 RX ORDER — SODIUM CHLORIDE 0.9 % (FLUSH) 0.9 %
3-10 SYRINGE (ML) INJECTION AS NEEDED
Status: DISCONTINUED | OUTPATIENT
Start: 2019-02-19 | End: 2019-02-20

## 2019-02-19 RX ORDER — LABETALOL HYDROCHLORIDE 5 MG/ML
20 INJECTION, SOLUTION INTRAVENOUS
Status: DISCONTINUED | OUTPATIENT
Start: 2019-02-19 | End: 2019-02-20

## 2019-02-19 RX ORDER — LIDOCAINE HYDROCHLORIDE 10 MG/ML
5 INJECTION, SOLUTION EPIDURAL; INFILTRATION; INTRACAUDAL; PERINEURAL AS NEEDED
Status: DISCONTINUED | OUTPATIENT
Start: 2019-02-19 | End: 2019-02-20

## 2019-02-19 RX ORDER — BUTORPHANOL TARTRATE 1 MG/ML
1 INJECTION, SOLUTION INTRAMUSCULAR; INTRAVENOUS
Status: DISCONTINUED | OUTPATIENT
Start: 2019-02-19 | End: 2019-02-20

## 2019-02-19 RX ORDER — PROMETHAZINE HYDROCHLORIDE 12.5 MG/1
12.5 TABLET ORAL
Status: DISCONTINUED | OUTPATIENT
Start: 2019-02-19 | End: 2019-02-20

## 2019-02-19 RX ORDER — PROMETHAZINE HYDROCHLORIDE 12.5 MG/1
12.5 SUPPOSITORY RECTAL
Status: DISCONTINUED | OUTPATIENT
Start: 2019-02-19 | End: 2019-02-20

## 2019-02-19 RX ORDER — MISOPROSTOL 100 MCG
25 TABLET ORAL ONCE
Status: COMPLETED | OUTPATIENT
Start: 2019-02-19 | End: 2019-02-19

## 2019-02-19 RX ORDER — DEXTROSE, SODIUM CHLORIDE, SODIUM LACTATE, POTASSIUM CHLORIDE, AND CALCIUM CHLORIDE 5; .6; .31; .03; .02 G/100ML; G/100ML; G/100ML; G/100ML; G/100ML
125 INJECTION, SOLUTION INTRAVENOUS CONTINUOUS
Status: DISCONTINUED | OUTPATIENT
Start: 2019-02-19 | End: 2019-02-20

## 2019-02-19 RX ORDER — MISOPROSTOL 100 MCG
TABLET ORAL
Status: DISPENSED
Start: 2019-02-19 | End: 2019-02-20

## 2019-02-19 RX ORDER — SODIUM CHLORIDE, SODIUM LACTATE, POTASSIUM CHLORIDE, CALCIUM CHLORIDE 600; 310; 30; 20 MG/100ML; MG/100ML; MG/100ML; MG/100ML
INJECTION, SOLUTION INTRAVENOUS
Status: COMPLETED
Start: 2019-02-19 | End: 2019-02-20

## 2019-02-19 RX ORDER — OXYTOCIN/0.9 % SODIUM CHLORIDE 30/500 ML
2-30 PLASTIC BAG, INJECTION (ML) INTRAVENOUS
Status: DISCONTINUED | OUTPATIENT
Start: 2019-02-19 | End: 2019-02-20

## 2019-02-19 RX ORDER — MAGNESIUM SULFATE HEPTAHYDRATE 40 MG/ML
2 INJECTION, SOLUTION INTRAVENOUS CONTINUOUS
Status: CANCELLED | OUTPATIENT
Start: 2019-02-19 | End: 2019-02-22

## 2019-02-19 RX ORDER — SODIUM CHLORIDE, SODIUM LACTATE, POTASSIUM CHLORIDE, CALCIUM CHLORIDE 600; 310; 30; 20 MG/100ML; MG/100ML; MG/100ML; MG/100ML
INJECTION, SOLUTION INTRAVENOUS
Status: DISPENSED
Start: 2019-02-19 | End: 2019-02-20

## 2019-02-19 RX ADMIN — MISOPROSTOL 25 MCG: 100 TABLET ORAL at 16:48

## 2019-02-19 RX ADMIN — Medication 2 MILLI-UNITS/MIN: at 22:04

## 2019-02-19 RX ADMIN — OXYTOCIN-SODIUM CHLORIDE 0.9% IV SOLN 30 UNIT/500ML 2 MILLI-UNITS/MIN: 30-0.9/5 SOLUTION at 22:04

## 2019-02-20 ENCOUNTER — ANESTHESIA EVENT (OUTPATIENT)
Dept: LABOR AND DELIVERY | Facility: HOSPITAL | Age: 24
End: 2019-02-20

## 2019-02-20 ENCOUNTER — ANESTHESIA (OUTPATIENT)
Dept: LABOR AND DELIVERY | Facility: HOSPITAL | Age: 24
End: 2019-02-20

## 2019-02-20 PROCEDURE — 25010000002 PHENYLEPHRINE PER 1 ML: Performed by: NURSE ANESTHETIST, CERTIFIED REGISTERED

## 2019-02-20 PROCEDURE — 51703 INSERT BLADDER CATH COMPLEX: CPT

## 2019-02-20 PROCEDURE — 3E033VJ INTRODUCTION OF OTHER HORMONE INTO PERIPHERAL VEIN, PERCUTANEOUS APPROACH: ICD-10-PCS | Performed by: OBSTETRICS & GYNECOLOGY

## 2019-02-20 PROCEDURE — 25010000002 BUTORPHANOL PER 1 MG: Performed by: OBSTETRICS & GYNECOLOGY

## 2019-02-20 PROCEDURE — C1755 CATHETER, INTRASPINAL: HCPCS | Performed by: NURSE ANESTHETIST, CERTIFIED REGISTERED

## 2019-02-20 PROCEDURE — 59400 OBSTETRICAL CARE: CPT | Performed by: OBSTETRICS & GYNECOLOGY

## 2019-02-20 PROCEDURE — 25010000002 PROMETHAZINE PER 50 MG

## 2019-02-20 PROCEDURE — C1755 CATHETER, INTRASPINAL: HCPCS

## 2019-02-20 RX ORDER — PRENATAL VIT/IRON FUM/FOLIC AC 27MG-0.8MG
1 TABLET ORAL DAILY
Status: DISCONTINUED | OUTPATIENT
Start: 2019-02-20 | End: 2019-02-20

## 2019-02-20 RX ORDER — SODIUM CHLORIDE 0.9 % (FLUSH) 0.9 %
1-10 SYRINGE (ML) INJECTION AS NEEDED
Status: DISCONTINUED | OUTPATIENT
Start: 2019-02-20 | End: 2019-02-20

## 2019-02-20 RX ORDER — BUTORPHANOL TARTRATE 1 MG/ML
INJECTION, SOLUTION INTRAMUSCULAR; INTRAVENOUS
Status: DISPENSED
Start: 2019-02-20 | End: 2019-02-20

## 2019-02-20 RX ORDER — LIDOCAINE HYDROCHLORIDE AND EPINEPHRINE 15; 5 MG/ML; UG/ML
INJECTION, SOLUTION EPIDURAL AS NEEDED
Status: DISCONTINUED | OUTPATIENT
Start: 2019-02-20 | End: 2019-02-20 | Stop reason: SURG

## 2019-02-20 RX ORDER — PROMETHAZINE HYDROCHLORIDE 25 MG/ML
INJECTION, SOLUTION INTRAMUSCULAR; INTRAVENOUS
Status: COMPLETED
Start: 2019-02-20 | End: 2019-02-20

## 2019-02-20 RX ORDER — IBUPROFEN 600 MG/1
TABLET ORAL
Status: COMPLETED
Start: 2019-02-20 | End: 2019-02-20

## 2019-02-20 RX ORDER — METHYLERGONOVINE MALEATE 0.2 MG/ML
200 INJECTION INTRAVENOUS ONCE AS NEEDED
Status: DISCONTINUED | OUTPATIENT
Start: 2019-02-20 | End: 2019-02-20

## 2019-02-20 RX ORDER — PROMETHAZINE HYDROCHLORIDE 25 MG/ML
INJECTION, SOLUTION INTRAMUSCULAR; INTRAVENOUS
Status: DISPENSED
Start: 2019-02-20 | End: 2019-02-20

## 2019-02-20 RX ORDER — MISOPROSTOL 200 UG/1
600 TABLET ORAL ONCE
Status: DISCONTINUED | OUTPATIENT
Start: 2019-02-20 | End: 2019-02-20

## 2019-02-20 RX ORDER — OXYTOCIN/0.9 % SODIUM CHLORIDE 30/500 ML
PLASTIC BAG, INJECTION (ML) INTRAVENOUS
Status: COMPLETED
Start: 2019-02-20 | End: 2019-02-20

## 2019-02-20 RX ORDER — MISOPROSTOL 200 UG/1
TABLET ORAL
Status: COMPLETED
Start: 2019-02-20 | End: 2019-02-20

## 2019-02-20 RX ORDER — LANOLIN 100 %
OINTMENT (GRAM) TOPICAL AS NEEDED
Status: DISCONTINUED | OUTPATIENT
Start: 2019-02-20 | End: 2019-02-20

## 2019-02-20 RX ORDER — EPHEDRINE SULFATE 50 MG/ML
INJECTION, SOLUTION INTRAVENOUS AS NEEDED
Status: DISCONTINUED | OUTPATIENT
Start: 2019-02-20 | End: 2019-02-20 | Stop reason: SURG

## 2019-02-20 RX ORDER — OXYTOCIN/0.9 % SODIUM CHLORIDE 30/500 ML
650 PLASTIC BAG, INJECTION (ML) INTRAVENOUS ONCE
Status: COMPLETED | OUTPATIENT
Start: 2019-02-20 | End: 2019-02-20

## 2019-02-20 RX ORDER — SODIUM CHLORIDE, SODIUM LACTATE, POTASSIUM CHLORIDE, CALCIUM CHLORIDE 600; 310; 30; 20 MG/100ML; MG/100ML; MG/100ML; MG/100ML
125 INJECTION, SOLUTION INTRAVENOUS CONTINUOUS
Status: DISCONTINUED | OUTPATIENT
Start: 2019-02-20 | End: 2019-02-20

## 2019-02-20 RX ORDER — LANOLIN 100 %
OINTMENT (GRAM) TOPICAL
Status: COMPLETED
Start: 2019-02-20 | End: 2019-02-20

## 2019-02-20 RX ORDER — IBUPROFEN 600 MG/1
600 TABLET ORAL EVERY 8 HOURS PRN
Status: DISCONTINUED | OUTPATIENT
Start: 2019-02-20 | End: 2019-02-22 | Stop reason: HOSPADM

## 2019-02-20 RX ORDER — LIDOCAINE HYDROCHLORIDE 10 MG/ML
INJECTION, SOLUTION INFILTRATION; PERINEURAL AS NEEDED
Status: DISCONTINUED | OUTPATIENT
Start: 2019-02-20 | End: 2019-02-20 | Stop reason: SURG

## 2019-02-20 RX ORDER — LANOLIN 100 %
OINTMENT (GRAM) TOPICAL
Status: DISCONTINUED | OUTPATIENT
Start: 2019-02-20 | End: 2019-02-22 | Stop reason: HOSPADM

## 2019-02-20 RX ORDER — HYDROCODONE BITARTRATE AND ACETAMINOPHEN 7.5; 325 MG/1; MG/1
1 TABLET ORAL EVERY 4 HOURS PRN
Status: DISCONTINUED | OUTPATIENT
Start: 2019-02-20 | End: 2019-02-22 | Stop reason: HOSPADM

## 2019-02-20 RX ORDER — MISOPROSTOL 200 UG/1
600 TABLET ORAL ONCE
Status: COMPLETED | OUTPATIENT
Start: 2019-02-20 | End: 2019-02-20

## 2019-02-20 RX ORDER — CARBOPROST TROMETHAMINE 250 UG/ML
250 INJECTION, SOLUTION INTRAMUSCULAR ONCE
Status: DISCONTINUED | OUTPATIENT
Start: 2019-02-20 | End: 2019-02-22 | Stop reason: HOSPADM

## 2019-02-20 RX ORDER — OXYTOCIN/0.9 % SODIUM CHLORIDE 30/500 ML
75 PLASTIC BAG, INJECTION (ML) INTRAVENOUS CONTINUOUS
Status: DISCONTINUED | OUTPATIENT
Start: 2019-02-20 | End: 2019-02-21

## 2019-02-20 RX ORDER — BISACODYL 10 MG
10 SUPPOSITORY, RECTAL RECTAL DAILY PRN
Status: DISCONTINUED | OUTPATIENT
Start: 2019-02-21 | End: 2019-02-22 | Stop reason: HOSPADM

## 2019-02-20 RX ORDER — DOCUSATE SODIUM 100 MG/1
100 CAPSULE, LIQUID FILLED ORAL DAILY
Status: DISCONTINUED | OUTPATIENT
Start: 2019-02-20 | End: 2019-02-22 | Stop reason: HOSPADM

## 2019-02-20 RX ORDER — DOCUSATE SODIUM 100 MG/1
CAPSULE, LIQUID FILLED ORAL
Status: COMPLETED
Start: 2019-02-20 | End: 2019-02-20

## 2019-02-20 RX ORDER — DEXTROSE, SODIUM CHLORIDE, SODIUM LACTATE, POTASSIUM CHLORIDE, AND CALCIUM CHLORIDE 5; .6; .31; .03; .02 G/100ML; G/100ML; G/100ML; G/100ML; G/100ML
INJECTION, SOLUTION INTRAVENOUS
Status: COMPLETED
Start: 2019-02-20 | End: 2019-02-20

## 2019-02-20 RX ORDER — ZOLPIDEM TARTRATE 5 MG/1
5 TABLET ORAL NIGHTLY PRN
Status: DISCONTINUED | OUTPATIENT
Start: 2019-02-20 | End: 2019-02-22 | Stop reason: HOSPADM

## 2019-02-20 RX ORDER — MISOPROSTOL 200 UG/1
800 TABLET ORAL AS NEEDED
Status: DISCONTINUED | OUTPATIENT
Start: 2019-02-20 | End: 2019-02-20

## 2019-02-20 RX ORDER — BUPIVACAINE HYDROCHLORIDE 2.5 MG/ML
INJECTION, SOLUTION EPIDURAL; INFILTRATION; INTRACAUDAL AS NEEDED
Status: DISCONTINUED | OUTPATIENT
Start: 2019-02-20 | End: 2019-02-20 | Stop reason: SURG

## 2019-02-20 RX ORDER — OXYTOCIN/0.9 % SODIUM CHLORIDE 30/500 ML
85 PLASTIC BAG, INJECTION (ML) INTRAVENOUS ONCE
Status: COMPLETED | OUTPATIENT
Start: 2019-02-20 | End: 2019-02-20

## 2019-02-20 RX ORDER — SODIUM CHLORIDE, SODIUM LACTATE, POTASSIUM CHLORIDE, CALCIUM CHLORIDE 600; 310; 30; 20 MG/100ML; MG/100ML; MG/100ML; MG/100ML
INJECTION, SOLUTION INTRAVENOUS
Status: COMPLETED
Start: 2019-02-20 | End: 2019-02-20

## 2019-02-20 RX ORDER — PRENATAL VIT/IRON FUM/FOLIC AC 27MG-0.8MG
1 TABLET ORAL DAILY
Status: DISCONTINUED | OUTPATIENT
Start: 2019-02-21 | End: 2019-02-22 | Stop reason: HOSPADM

## 2019-02-20 RX ORDER — CARBOPROST TROMETHAMINE 250 UG/ML
250 INJECTION, SOLUTION INTRAMUSCULAR AS NEEDED
Status: DISCONTINUED | OUTPATIENT
Start: 2019-02-20 | End: 2019-02-20

## 2019-02-20 RX ORDER — PROMETHAZINE HYDROCHLORIDE 25 MG/ML
12.5 INJECTION, SOLUTION INTRAMUSCULAR; INTRAVENOUS EVERY 4 HOURS PRN
Status: DISCONTINUED | OUTPATIENT
Start: 2019-02-20 | End: 2019-02-22 | Stop reason: HOSPADM

## 2019-02-20 RX ADMIN — SODIUM CHLORIDE, POTASSIUM CHLORIDE, SODIUM LACTATE AND CALCIUM CHLORIDE 55 ML/HR: 600; 310; 30; 20 INJECTION, SOLUTION INTRAVENOUS at 08:38

## 2019-02-20 RX ADMIN — Medication: at 16:35

## 2019-02-20 RX ADMIN — Medication 75 ML/HR: at 21:48

## 2019-02-20 RX ADMIN — OXYTOCIN-SODIUM CHLORIDE 0.9% IV SOLN 30 UNIT/500ML 75 ML/HR: 30-0.9/5 SOLUTION at 21:48

## 2019-02-20 RX ADMIN — LIDOCAINE HYDROCHLORIDE 4 ML: 10 INJECTION, SOLUTION INFILTRATION; PERINEURAL at 07:50

## 2019-02-20 RX ADMIN — IBUPROFEN 600 MG: 600 TABLET ORAL at 16:33

## 2019-02-20 RX ADMIN — PROMETHAZINE HYDROCHLORIDE 6.25 MG: 25 INJECTION INTRAMUSCULAR; INTRAVENOUS at 05:44

## 2019-02-20 RX ADMIN — DOCUSATE SODIUM 100 MG: 100 CAPSULE, LIQUID FILLED ORAL at 21:48

## 2019-02-20 RX ADMIN — Medication 10 ML/HR: at 08:08

## 2019-02-20 RX ADMIN — OXYTOCIN-SODIUM CHLORIDE 0.9% IV SOLN 30 UNIT/500ML 650 ML/HR: 30-0.9/5 SOLUTION at 12:45

## 2019-02-20 RX ADMIN — SODIUM CHLORIDE, SODIUM LACTATE, POTASSIUM CHLORIDE, CALCIUM CHLORIDE 55 ML/HR: 600; 310; 30; 20 INJECTION, SOLUTION INTRAVENOUS at 08:38

## 2019-02-20 RX ADMIN — MISOPROSTOL 600 MCG: 200 TABLET ORAL at 12:47

## 2019-02-20 RX ADMIN — PHENYLEPHRINE HYDROCHLORIDE 100 MCG: 10 INJECTION INTRAVENOUS at 08:47

## 2019-02-20 RX ADMIN — Medication 650 ML/HR: at 12:45

## 2019-02-20 RX ADMIN — SODIUM CHLORIDE, SODIUM LACTATE, POTASSIUM CHLORIDE, CALCIUM CHLORIDE AND DEXTROSE MONOHYDRATE 60 ML/HR: 5; 600; 310; 30; 20 INJECTION, SOLUTION INTRAVENOUS at 07:04

## 2019-02-20 RX ADMIN — PROMETHAZINE HYDROCHLORIDE 6.25 MG: 25 INJECTION, SOLUTION INTRAMUSCULAR; INTRAVENOUS at 05:44

## 2019-02-20 RX ADMIN — BUTORPHANOL TARTRATE 1 MG: 1 INJECTION, SOLUTION INTRAMUSCULAR; INTRAVENOUS at 06:26

## 2019-02-20 RX ADMIN — LIDOCAINE HYDROCHLORIDE AND EPINEPHRINE 3 ML: 15; 5 INJECTION, SOLUTION EPIDURAL at 08:02

## 2019-02-20 RX ADMIN — BUPIVACAINE HYDROCHLORIDE 5 ML: 2.5 INJECTION, SOLUTION EPIDURAL; INFILTRATION; INTRACAUDAL; PERINEURAL at 11:38

## 2019-02-20 RX ADMIN — OXYTOCIN-SODIUM CHLORIDE 0.9% IV SOLN 30 UNIT/500ML 85 ML/HR: 30-0.9/5 SOLUTION at 13:45

## 2019-02-20 RX ADMIN — EPHEDRINE SULFATE 10 MG: 50 INJECTION INTRAVENOUS at 08:15

## 2019-02-20 RX ADMIN — BUPIVACAINE HYDROCHLORIDE 5 ML: 2.5 INJECTION, SOLUTION EPIDURAL; INFILTRATION; INTRACAUDAL; PERINEURAL at 08:07

## 2019-02-20 RX ADMIN — SODIUM CHLORIDE, POTASSIUM CHLORIDE, SODIUM LACTATE AND CALCIUM CHLORIDE 1000 ML: 600; 310; 30; 20 INJECTION, SOLUTION INTRAVENOUS at 07:27

## 2019-02-20 RX ADMIN — PHENYLEPHRINE HYDROCHLORIDE 100 MCG: 10 INJECTION INTRAVENOUS at 08:52

## 2019-02-20 RX ADMIN — MAGNESIUM SULFATE HEPTAHYDRATE 2 G/HR: 40 INJECTION, SOLUTION INTRAVENOUS at 13:49

## 2019-02-20 RX ADMIN — BENZOCAINE AND MENTHOL: 20; .5 SPRAY TOPICAL at 16:35

## 2019-02-20 RX ADMIN — DEXTROSE, SODIUM CHLORIDE, SODIUM LACTATE, POTASSIUM CHLORIDE, AND CALCIUM CHLORIDE 60 ML/HR: 5; .6; .31; .03; .02 INJECTION, SOLUTION INTRAVENOUS at 07:04

## 2019-02-20 RX ADMIN — BUTORPHANOL TARTRATE 1 MG: 1 INJECTION, SOLUTION INTRAMUSCULAR; INTRAVENOUS at 05:43

## 2019-02-20 RX ADMIN — MAGNESIUM SULFATE HEPTAHYDRATE 2 G/HR: 40 INJECTION, SOLUTION INTRAVENOUS at 03:35

## 2019-02-20 NOTE — ANESTHESIA PROCEDURE NOTES
Epidural Block      Patient reassessed immediately prior to procedure    Patient location during procedure: OB  Start Time: 2/20/2019 7:47 AM  Indication:at surgeon's request  Performed By  CRNA: Hermelinda William CRNA  Preanesthetic Checklist  Completed: patient identified, site marked, surgical consent, pre-op evaluation, timeout performed, IV checked, risks and benefits discussed and monitors and equipment checked  Prep:  Pt Position:sitting  Sterile Tech:cap, gloves, mask and sterile barrier  Prep:povidone-iodine 7.5% surgical scrub  Monitoring:blood pressure monitoring, continuous pulse oximetry and EKG  Epidural Block Procedure:  Approach:midline  Guidance:landmark technique  Location:lumbar  Level:3-4  Needle Type:Tuohy  Needle Gauge:17 G  Loss of Resistance Medium: saline  Loss of Resistance: 7cm  Cath Depth at skin:14 cm  Paresthesia: none  Aspiration:negative  Test Dose:negative

## 2019-02-20 NOTE — INTERVAL H&P NOTE
H&P updated. The patient was examined and the following changes are noted:  Tonight 1cm 75% will place cytotec risks alt reviewed

## 2019-02-20 NOTE — ANESTHESIA POSTPROCEDURE EVALUATION
Patient: Vidhi Moody    Procedure Summary     Date:  02/20/19 Room / Location:      Anesthesia Start:  0738 Anesthesia Stop:  1402    Procedure:  LABOR ANALGESIA Diagnosis:      Scheduled Providers:   Provider:  Hermelinda William CRNA    Anesthesia Type:  epidural ASA Status:  2          Anesthesia Type: epidural  Last vitals  BP   156/85 (02/20/19 1353)   Temp   97.8 °F (36.6 °C) (02/20/19 1253)   Pulse   (!) 130 (02/20/19 1353)   Resp   18 (02/20/19 1253)     SpO2   100 % (02/20/19 1342)     Post Anesthesia Care and Evaluation    Patient location during evaluation: bedside  Patient participation: complete - patient participated  Level of consciousness: awake and alert  Pain score: 0  Pain management: adequate  Airway patency: patent  Anesthetic complications: No anesthetic complications  PONV Status: none  Cardiovascular status: acceptable  Respiratory status: acceptable  Hydration status: acceptable  Post Neuraxial Block status: No signs or symptoms of PDPH

## 2019-02-20 NOTE — ANESTHESIA PREPROCEDURE EVALUATION
Anesthesia Evaluation     Patient summary reviewed and Nursing notes reviewed   NPO Solid Status: > 8 hours  NPO Liquid Status: > 8 hours           Airway   Mallampati: II  TM distance: >3 FB  Neck ROM: full  No difficulty expected  Dental - normal exam     Pulmonary - negative pulmonary ROS and normal exam   Cardiovascular - normal exam    (+) hypertension,       Neuro/Psych- negative ROS  GI/Hepatic/Renal/Endo      Musculoskeletal     Abdominal  - normal exam   Substance History      OB/GYN    (+) Pregnant, Preeclampsia, pregnancy induced hypertension        Other                        Anesthesia Plan    ASA 2     epidural     Anesthetic plan, all risks, benefits, and alternatives have been provided, discussed and informed consent has been obtained with: patient and spouse/significant other.    Plan discussed with CRNA.

## 2019-02-20 NOTE — PLAN OF CARE
Problem: Patient Care Overview  Goal: Plan of Care Review  Outcome: Ongoing (interventions implemented as appropriate)   19 [id number]   Coping/Psychosocial   Plan of Care Reviewed With patient   Plan of Care Review   Progress improving   OTHER   Outcome Summary , VSS, fundus firm/-1/lochia light, voids spontaneously, pain controlled with motin     Goal: Individualization and Mutuality  Outcome: Ongoing (interventions implemented as appropriate)    Goal: Discharge Needs Assessment  Outcome: Ongoing (interventions implemented as appropriate)    Goal: Interprofessional Rounds/Family Conf  Outcome: Ongoing (interventions implemented as appropriate)      Problem: Hypertensive Disorders in Pregnancy (Adult,Obstetrics,Pediatric)  Goal: Signs and Symptoms of Listed Potential Problems Will be Absent, Minimized or Managed (Hypertensive Disorders in Pregnancy)  Outcome: Ongoing (interventions implemented as appropriate)      Problem: Postpartum (Vaginal Delivery) (Adult,Obstetrics,Pediatric)  Goal: Signs and Symptoms of Listed Potential Problems Will be Absent, Minimized or Managed (Postpartum)  Outcome: Ongoing (interventions implemented as appropriate)

## 2019-02-20 NOTE — PLAN OF CARE
Problem: Patient Care Overview  Goal: Plan of Care Review  Outcome: Ongoing (interventions implemented as appropriate)   02/20/19 8267   Coping/Psychosocial   Plan of Care Reviewed With patient;spouse;father;family   Plan of Care Review   Progress improving   OTHER   Outcome Summary induction pitocin infusing, magnesium sulfate infusing, BPs stable overnight, no IV labetalol needed, pt given IV pain meds-effective, states she will prob want an epidural later     Goal: Individualization and Mutuality  Outcome: Ongoing (interventions implemented as appropriate)    Goal: Discharge Needs Assessment  Outcome: Ongoing (interventions implemented as appropriate)    Goal: Interprofessional Rounds/Family Conf  Outcome: Ongoing (interventions implemented as appropriate)      Problem: Labor (Cervical Ripen, Induct, Augment) (Adult,Obstetrics,Pediatric)  Goal: Signs and Symptoms of Listed Potential Problems Will be Absent, Minimized or Managed (Labor)  Outcome: Ongoing (interventions implemented as appropriate)      Problem: Hypertensive Disorders in Pregnancy (Adult,Obstetrics,Pediatric)  Goal: Signs and Symptoms of Listed Potential Problems Will be Absent, Minimized or Managed (Hypertensive Disorders in Pregnancy)  Outcome: Ongoing (interventions implemented as appropriate)

## 2019-02-21 LAB
BASOPHILS # BLD AUTO: 0.03 10*3/MM3 (ref 0–0.2)
BASOPHILS NFR BLD AUTO: 0.2 % (ref 0–1.5)
DEPRECATED RDW RBC AUTO: 41.3 FL (ref 37–54)
EOSINOPHIL # BLD AUTO: 0.06 10*3/MM3 (ref 0–0.4)
EOSINOPHIL NFR BLD AUTO: 0.5 % (ref 0.3–6.2)
ERYTHROCYTE [DISTWIDTH] IN BLOOD BY AUTOMATED COUNT: 13.5 % (ref 12.3–15.4)
HCT VFR BLD AUTO: 29.2 % (ref 34–46.6)
HGB BLD-MCNC: 10.1 G/DL (ref 12–15.9)
IMM GRANULOCYTES # BLD AUTO: 0.1 10*3/MM3 (ref 0–0.05)
IMM GRANULOCYTES NFR BLD AUTO: 0.8 % (ref 0–0.5)
LYMPHOCYTES # BLD AUTO: 3.04 10*3/MM3 (ref 0.7–3.1)
LYMPHOCYTES NFR BLD AUTO: 23.6 % (ref 19.6–45.3)
MCH RBC QN AUTO: 29 PG (ref 26.6–33)
MCHC RBC AUTO-ENTMCNC: 34.6 G/DL (ref 31.5–35.7)
MCV RBC AUTO: 83.9 FL (ref 79–97)
MONOCYTES # BLD AUTO: 1 10*3/MM3 (ref 0.1–0.9)
MONOCYTES NFR BLD AUTO: 7.8 % (ref 5–12)
NEUTROPHILS # BLD AUTO: 8.67 10*3/MM3 (ref 1.4–7)
NEUTROPHILS NFR BLD AUTO: 67.1 % (ref 42.7–76)
NRBC BLD AUTO-RTO: 0 /100 WBC (ref 0–0)
PLATELET # BLD AUTO: 137 10*3/MM3 (ref 140–450)
PMV BLD AUTO: 10.7 FL (ref 6–12)
RBC # BLD AUTO: 3.48 10*6/MM3 (ref 3.77–5.28)
WBC NRBC COR # BLD: 12.9 10*3/MM3 (ref 3.4–10.8)

## 2019-02-21 PROCEDURE — 85025 COMPLETE CBC W/AUTO DIFF WBC: CPT | Performed by: OBSTETRICS & GYNECOLOGY

## 2019-02-21 RX ORDER — PRENATAL VIT/IRON FUM/FOLIC AC 27MG-0.8MG
TABLET ORAL
Status: COMPLETED
Start: 2019-02-21 | End: 2019-02-21

## 2019-02-21 RX ORDER — IBUPROFEN 600 MG/1
TABLET ORAL
Status: COMPLETED
Start: 2019-02-21 | End: 2019-02-21

## 2019-02-21 RX ORDER — OXYTOCIN/0.9 % SODIUM CHLORIDE 30/500 ML
PLASTIC BAG, INJECTION (ML) INTRAVENOUS
Status: COMPLETED
Start: 2019-02-21 | End: 2019-02-21

## 2019-02-21 RX ORDER — DOCUSATE SODIUM 100 MG/1
CAPSULE, LIQUID FILLED ORAL
Status: COMPLETED
Start: 2019-02-21 | End: 2019-02-21

## 2019-02-21 RX ORDER — MAGNESIUM SULFATE HEPTAHYDRATE 40 MG/ML
2 INJECTION, SOLUTION INTRAVENOUS CONTINUOUS
Status: DISCONTINUED | OUTPATIENT
Start: 2019-02-21 | End: 2019-02-21

## 2019-02-21 RX ADMIN — DOCUSATE SODIUM 100 MG: 100 CAPSULE, LIQUID FILLED ORAL at 09:44

## 2019-02-21 RX ADMIN — Medication 75 ML/HR: at 04:43

## 2019-02-21 RX ADMIN — PRENATAL VIT W/ FE FUMARATE-FA TAB 27-0.8 MG 1 TABLET: 27-0.8 TAB at 09:44

## 2019-02-21 RX ADMIN — MAGNESIUM SULFATE HEPTAHYDRATE 2 G/HR: 40 INJECTION, SOLUTION INTRAVENOUS at 01:52

## 2019-02-21 RX ADMIN — IBUPROFEN 600 MG: 600 TABLET ORAL at 01:11

## 2019-02-21 RX ADMIN — IBUPROFEN 600 MG: 600 TABLET ORAL at 21:20

## 2019-02-21 RX ADMIN — OXYTOCIN-SODIUM CHLORIDE 0.9% IV SOLN 30 UNIT/500ML 75 ML/HR: 30-0.9/5 SOLUTION at 04:43

## 2019-02-21 NOTE — PROGRESS NOTES
[facility] Gabrielle Moody  : 1995  MRN: [id number]  CSN: [id number]    Postpartum Day #1  Subjective   Ms. Moody states that she is having some abdominal cramping, but overall she is having little pain. Her lochia is about the amount of period and decreasing since yesterday. No nausea or vomiting. She has no problems with urination or bowel movements. She is having trouble having the baby latch for breast feeding. She is bonding well with the baby. Her pregnancy was complicated by pre-eclampsia and her blood pressures are still labile. Patient is tolerating magnesium well.      Objective     Min/max vitals past 24 hours:   Temp  Min: 97.3 °F (36.3 °C)  Max: 99.6 °F (37.6 °C)  BP  Min: 108/66  Max: 160/93  Pulse  Min: 87  Max: 142  Pulse  Min: 87  Max: 142        Abdomen: soft, non-tender;   fundus firm and non-tender       Pelvic: deferred     Lab Results   Component Value Date    WBC 14.77 (H) 2019    HGB 10.7 (L) 2019    HCT 30.3 (L) 2019    MCV 82.3 2019     2019    RH Positive 2019    HEPBSAG Negative 08/10/2018        Assessment   1. Postpartum Day #1 S/P vaginal delivery     Plan   1. Continue routine postpartum care  2. Ambulate and OOB  3. Discontinue Magnesium after 24hrs if blood pressure continue to stay well controlled  4. Expect Discharge in 1-2 days.       This document has been electronically signed by Bret Swenson on 2019 6:54 AM    I have seen and evaluated the patient.  I have discussed the case with the resident. I have reviewed the notes, assessment and plan, and/or procedures performed by the resident. I concur with the resident’s documentation.        This document has been electronically signed by Diego Tate MD on 2019 8:59 PM   Patient did well overnight on magnesium.  DTRs 2+/4 no headaches visual changes epigastric pain felt stable for discharge after 24 hours of magnesium to floor

## 2019-02-21 NOTE — PLAN OF CARE
Problem: Patient Care Overview  Goal: Plan of Care Review  Outcome: Ongoing (interventions implemented as appropriate)   02/21/19 2920   Coping/Psychosocial   Plan of Care Reviewed With patient;spouse   Plan of Care Review   Progress improving   OTHER   Outcome Summary vss, fundus firm with bleeding light, voiding well, pain well controlled with motrin, ambulating in room to bathroom, not succesfully latching baby.      Goal: Individualization and Mutuality  Outcome: Ongoing (interventions implemented as appropriate)    Goal: Discharge Needs Assessment  Outcome: Ongoing (interventions implemented as appropriate)    Goal: Interprofessional Rounds/Family Conf  Outcome: Ongoing (interventions implemented as appropriate)      Problem: Hypertensive Disorders in Pregnancy (Adult,Obstetrics,Pediatric)  Goal: Signs and Symptoms of Listed Potential Problems Will be Absent, Minimized or Managed (Hypertensive Disorders in Pregnancy)  Outcome: Ongoing (interventions implemented as appropriate)      Problem: Postpartum (Vaginal Delivery) (Adult,Obstetrics,Pediatric)  Goal: Signs and Symptoms of Listed Potential Problems Will be Absent, Minimized or Managed (Postpartum)  Outcome: Ongoing (interventions implemented as appropriate)

## 2019-02-21 NOTE — PLAN OF CARE
Problem: Patient Care Overview  Goal: Plan of Care Review  Outcome: Ongoing (interventions implemented as appropriate)   02/21/19 9036   Coping/Psychosocial   Plan of Care Reviewed With patient   Plan of Care Review   Progress improving   OTHER   Outcome Summary denies pain, no complaints at this time     Goal: Individualization and Mutuality  Outcome: Ongoing (interventions implemented as appropriate)    Goal: Discharge Needs Assessment  Outcome: Ongoing (interventions implemented as appropriate)      Problem: Hypertensive Disorders in Pregnancy (Adult,Obstetrics,Pediatric)  Goal: Signs and Symptoms of Listed Potential Problems Will be Absent, Minimized or Managed (Hypertensive Disorders in Pregnancy)  Outcome: Ongoing (interventions implemented as appropriate)      Problem: Postpartum (Vaginal Delivery) (Adult,Obstetrics,Pediatric)  Goal: Signs and Symptoms of Listed Potential Problems Will be Absent, Minimized or Managed (Postpartum)  Outcome: Ongoing (interventions implemented as appropriate)      Problem: Breastfeeding (Adult,Obstetrics,Pediatric)  Goal: Signs and Symptoms of Listed Potential Problems Will be Absent, Minimized or Managed (Breastfeeding)  Outcome: Ongoing (interventions implemented as appropriate)

## 2019-02-21 NOTE — PLAN OF CARE
Problem: Breastfeeding (Adult,Obstetrics,Pediatric)  Goal: Signs and Symptoms of Listed Potential Problems Will be Absent, Minimized or Managed (Breastfeeding)  Outcome: Ongoing (interventions implemented as appropriate)  Still no latch from infant at this time. Mother is doing very well with expressing milk and feeding the baby via cup or spoon. Mother has a great attitude towards breast feeding and states she will pump as long as she needs to until babe is latching.

## 2019-02-21 NOTE — L&D DELIVERY NOTE
[facility]  Vaginal Delivery Note    Delivery     Delivery: Vaginal, Spontaneous  37-1/7 weeks    Preeclampsia   YOB: 2021    Time of Birth: 12:35 PM      Anesthesia: Epidural     Delivering clinician: Diego Tate       Delivery narrative: Patient progressed and delivered prepped and draped viable infant without episiotomy.  Shoulders came without difficulty infant placed on maternal abdomen for Kangaroo care.  Cord clamped in a delayed fashion.  Cut by father at maternal request.  Cord blood obtained placenta delivered spontaneously appeared intact there were some abrasions but nothing felt to need suturing.  Plan for postpartum magnesium    Complications  hypertension    Infant    Findings: female  infant     Infant observations: Weight: 2807 g (6 lb 3 oz)   Length: 18.5  in  Observations/Comments:         Apgars: 8   @ 1 minute /    10   @ 5 minutes     Placenta, Cord, and Fluid    Placenta delivered  Spontaneous  at   2/20/2019 12:45 PM     Cord: 3 vessels  present.   Nuchal Cord?  no   Cord blood obtained: Yes    Cord gases obtained:  No      Repair    Episiotomy: None    Lacerations: No   Estimated Blood Loss: Est. Blood Loss (mL): 300 mL(Filed from Delivery Summary) (02/20/19 2075)     Suture used for repair: n/a n/a and n/a n/a     Disposition  Mother to Remain in LD  in stable condition currently.  Baby to remains with mom  in stable condition currently.              This document has been electronically signed by Diego Tate MD on February 20, 2019 8:47 PM

## 2019-02-21 NOTE — PLAN OF CARE
Problem: Patient Care Overview  Goal: Plan of Care Review  Outcome: Ongoing (interventions implemented as appropriate)   02/21/19 5484   Coping/Psychosocial   Plan of Care Reviewed With patient   Plan of Care Review   Progress improving   OTHER   Outcome Summary VSS, fundus firm and lochia light, voids spontaneously, output appropriate, denies pain, up ad emanuel, transferred to MBU     Goal: Individualization and Mutuality  Outcome: Ongoing (interventions implemented as appropriate)    Goal: Discharge Needs Assessment  Outcome: Ongoing (interventions implemented as appropriate)    Goal: Interprofessional Rounds/Family Conf  Outcome: Ongoing (interventions implemented as appropriate)      Problem: Hypertensive Disorders in Pregnancy (Adult,Obstetrics,Pediatric)  Goal: Signs and Symptoms of Listed Potential Problems Will be Absent, Minimized or Managed (Hypertensive Disorders in Pregnancy)  Outcome: Ongoing (interventions implemented as appropriate)      Problem: Postpartum (Vaginal Delivery) (Adult,Obstetrics,Pediatric)  Goal: Signs and Symptoms of Listed Potential Problems Will be Absent, Minimized or Managed (Postpartum)  Outcome: Ongoing (interventions implemented as appropriate)      Problem: Breastfeeding (Adult,Obstetrics,Pediatric)  Goal: Signs and Symptoms of Listed Potential Problems Will be Absent, Minimized or Managed (Breastfeeding)  Outcome: Ongoing (interventions implemented as appropriate)

## 2019-02-21 NOTE — LACTATION NOTE
This note was copied from a baby's chart.  Infant did latch briefly this feeding. Mom was so excited. She did have 2-3 swallows but lost her latch easily. Mom did pump and I cup fed the 7.5ml she expressed. Please avoid giving infant bottles and pacifiers.

## 2019-02-22 VITALS
DIASTOLIC BLOOD PRESSURE: 77 MMHG | SYSTOLIC BLOOD PRESSURE: 123 MMHG | RESPIRATION RATE: 16 BRPM | OXYGEN SATURATION: 99 % | HEART RATE: 84 BPM | BODY MASS INDEX: 32.89 KG/M2 | TEMPERATURE: 97.8 F | WEIGHT: 210 LBS

## 2019-02-22 RX ORDER — IBUPROFEN 600 MG/1
600 TABLET ORAL EVERY 8 HOURS PRN
Qty: 90 TABLET | Refills: 1 | Status: SHIPPED | OUTPATIENT
Start: 2019-02-22 | End: 2019-10-16

## 2019-02-22 RX ADMIN — IBUPROFEN 600 MG: 600 TABLET ORAL at 06:18

## 2019-02-22 RX ADMIN — PRENATAL VIT W/ FE FUMARATE-FA TAB 27-0.8 MG 1 TABLET: 27-0.8 TAB at 09:09

## 2019-02-22 RX ADMIN — DOCUSATE SODIUM 100 MG: 100 CAPSULE, LIQUID FILLED ORAL at 09:09

## 2019-02-22 NOTE — DISCHARGE SUMMARY
[facility]  Vidhi Moody  : 1995  MRN: [id number]  CSN: [id number]    Discharge Summary:    Date of Admission: 2019  Date of Discharge:  2019    Admitting Diagnosis:  1. Intrauterine Pregnancy @ 37w1d  2. For induction of labor   3. Preeclampsia without severe features     Discharge Diagnosis:  1. S/P   2. Preeclampsia without severe features     History and Hospital Course:  Patient is a 23 y.o.   who presents for induction of labor.  Her pregnancy was complicated by preeclampsia without severe features.  Please see History and Physical for full details.       She was admitted and progressed in labor with pitocin augmentation and epidural analgesia to completely dilated. She had a vaginal delivery of a  viable female   infant who weighed 2807 g (6 lb 3 oz)  and APGARs of        APGARS  One minute Five minutes Ten minutes Fifteen minutes Twenty minutes   Skin color: 0   2             Heart rate: 2   2             Grimace: 2   2              Muscle tone: 2   2              Breathin   2              Totals: 8   10              . No immediate complications were encountered. Please see procedure note for full details.      Her postpartum course has been unremarkable. She had no signs or symptoms of acute blood loss anemia or worsening hypertensive disease. Her blood pressures were normotensive during her postpartum period. She was ambulating well, voiding without difficulty and lochia was within normal limits. She was breast feeding without difficulty. She was stable for discharge on postpartum day 2 and desired to be discharged home.      Precautions and instructions were discussed with her including but not limited to maintaining a regular diet at home, practicing local hygiene, pelvic rest, and signs and symptoms to report including heavy bleeding, frequent passage of clots, fainting or dizziness, foul odor of lochia, increasing pain, fever, or any other  concerns.    Discharge Medications:     Discharge Medications      New Medications      Instructions Start Date   ibuprofen 600 MG tablet  Commonly known as:  ADVIL,MOTRIN   600 mg, Oral, Every 8 Hours PRN         Continue These Medications      Instructions Start Date   prenatal vitamin 27-0.8 27-0.8 MG tablet tablet   Oral, Daily             Patient will restart all home medications including prenatal vitamins.    Discharged home in good condition.  Vaginal rest for 6 weeks.  Otherwise activity as tolerated.  Regular diet.  She was asked to follow up in the office in 2 weeks.     This document has been electronically signed by Michelle Harrington MD on February 22, 2019 7:06 AM    I have seen and evaluated the patient.  I have discussed the case with the resident. I have reviewed the notes, assessment and plan, and/or procedures performed by the resident. I concur with the resident’s documentation.        This document has been electronically signed by Diego aTte MD on February 22, 2019 1:56 PM

## 2019-02-22 NOTE — PROGRESS NOTES
[facility] Gabrielle Moody  : 1995  MRN: [id number]  CSN: [id number]    Postpartum Day #2  Subjective   Mrs. Moody is having some cramping pain when pumping breast milk, but is doing well. Her lochia is decreased drastically and significantly less than a period. No nausea or vomiting. She has been breast feeding, but the baby is not consistently latching yet. She is bonding well with the baby. Her pregnancy was complicated with pre-eclampsia, but her blood pressures have been stable in the post-partum period.      Objective     Min/max vitals past 24 hours:   Temp  Min: 97.5 °F (36.4 °C)  Max: 99 °F (37.2 °C)  BP  Min: 115/68  Max: 136/83  Pulse  Min: 93  Max: 110  Pulse  Min: 93  Max: 110        General: Well developed; well nourished.  No acute distress.   Abdomen: Fundus firm and non-tender.   Pelvic: Not performed.         Lab Results   Component Value Date    WBC 12.90 (H) 2019    HGB 10.1 (L) 2019    HCT 29.2 (L) 2019    MCV 83.9 2019     (L) 2019    RH Positive 2019    HEPBSAG Negative 08/10/2018        Assessment   1. Postpartum Day #2 S/P vaginal delivery     Plan   1. Discharge to home.  2. Advised nothing in the vagina for 6 weeks.  3. Discharge questions all answered.  4. Follow-up appointment in 2 week(s).    This document has been electronically signed by Bret Swenson on 2019 7:06 AM

## 2019-02-22 NOTE — PLAN OF CARE
Problem: Patient Care Overview  Goal: Plan of Care Review  Outcome: Ongoing (interventions implemented as appropriate)   02/22/19 4218   Coping/Psychosocial   Plan of Care Reviewed With patient   Plan of Care Review   Progress improving   OTHER   Outcome Summary VSS, pain controlled with motrin, fundus firm, lochia light, attempts to latch baby, pumping.      Goal: Individualization and Mutuality  Outcome: Ongoing (interventions implemented as appropriate)    Goal: Discharge Needs Assessment  Outcome: Ongoing (interventions implemented as appropriate)    Goal: Interprofessional Rounds/Family Conf  Outcome: Ongoing (interventions implemented as appropriate)      Problem: Postpartum (Vaginal Delivery) (Adult,Obstetrics,Pediatric)  Goal: Signs and Symptoms of Listed Potential Problems Will be Absent, Minimized or Managed (Postpartum)  Outcome: Ongoing (interventions implemented as appropriate)      Problem: Breastfeeding (Adult,Obstetrics,Pediatric)  Goal: Signs and Symptoms of Listed Potential Problems Will be Absent, Minimized or Managed (Breastfeeding)  Outcome: Ongoing (interventions implemented as appropriate)

## 2019-03-01 DIAGNOSIS — Z3A.36 36 WEEKS GESTATION OF PREGNANCY: ICD-10-CM

## 2019-03-01 DIAGNOSIS — O14.03 MILD PREECLAMPSIA, THIRD TRIMESTER: ICD-10-CM

## 2019-03-19 ENCOUNTER — TELEPHONE (OUTPATIENT)
Dept: OBSTETRICS AND GYNECOLOGY | Facility: HOSPITAL | Age: 24
End: 2019-03-19

## 2019-03-22 ENCOUNTER — OFFICE VISIT (OUTPATIENT)
Dept: OBSTETRICS AND GYNECOLOGY | Facility: CLINIC | Age: 24
End: 2019-03-22

## 2019-03-22 VITALS
BODY MASS INDEX: 30.07 KG/M2 | DIASTOLIC BLOOD PRESSURE: 80 MMHG | SYSTOLIC BLOOD PRESSURE: 116 MMHG | HEIGHT: 67 IN | WEIGHT: 191.6 LBS

## 2019-03-22 DIAGNOSIS — Z12.4 CERVICAL CANCER SCREENING: Primary | ICD-10-CM

## 2019-03-22 PROCEDURE — 88141 CYTOPATH C/V INTERPRET: CPT | Performed by: PATHOLOGY

## 2019-03-22 PROCEDURE — 0503F POSTPARTUM CARE VISIT: CPT | Performed by: OBSTETRICS & GYNECOLOGY

## 2019-03-22 PROCEDURE — 87625 HPV TYPES 16 & 18 ONLY: CPT | Performed by: OBSTETRICS & GYNECOLOGY

## 2019-03-22 PROCEDURE — 88142 CYTOPATH C/V THIN LAYER: CPT | Performed by: OBSTETRICS & GYNECOLOGY

## 2019-03-22 RX ORDER — ACETAMINOPHEN AND CODEINE PHOSPHATE 120; 12 MG/5ML; MG/5ML
1 SOLUTION ORAL DAILY
Qty: 28 TABLET | Refills: 12 | Status: SHIPPED | OUTPATIENT
Start: 2019-03-22 | End: 2019-04-19

## 2019-03-23 NOTE — PROGRESS NOTES
Vidhi Moody is a 24 y.o. y/o female.     Chief Complaint: Postpartum visit    HPI:   24 y.o. y/o .  No LMP recorded (within days)..  Patient postpartum doing well breast-feeding contraceptive options discussed wants to use oral contraceptive will give progesterone only pills          Review of Systems   ROS:  CNS: No history of brain malignancy  HEENT: No history of throat cancer  Eye: No history of retinal cancer  Pulmonary: No history of lung cancer                                                                                 Cardiovascular: No history of cardiac tumors  Gastrointestinal: No history of small bowel tumors  Renal: No history of kidney  Musculoskeletal: No history of smooth muscle tumors  Lymphatics: No history of of Hodgkin's disease  Endocrine: No history of thyroid malignancy    The following portions of the patient's history were reviewed and updated as appropriate: allergies, current medications, past family history, past medical history, past social history, past surgical history and problem list.    Allergies   Allergen Reactions   • Hydrocodone Nausea And Vomiting   • Adhesive Tape Hives   • Dilaudid [Hydromorphone] Irritability        Outpatient Medications Prior to Visit   Medication Sig Dispense Refill   • Fenugreek 610 MG capsule Take 3 capsules by mouth 3 (Three) Times a Day.     • ibuprofen (ADVIL,MOTRIN) 600 MG tablet Take 1 tablet by mouth Every 8 (Eight) Hours As Needed for Mild Pain. 90 tablet 1   • Prenatal Vit-Fe Fumarate-FA (PRENATAL VITAMIN 27-0.8) 27-0.8 MG tablet tablet Take  by mouth Daily.       No facility-administered medications prior to visit.         The patient has a family history of   Family History   Problem Relation Age of Onset   • Hypertension Mother    • Hypertension Maternal Grandfather    • Hyperlipidemia Maternal Grandfather    • Heart disease Maternal Grandfather    • Breast cancer Paternal Grandmother 50   • Colon cancer Paternal Uncle  "50   • Diabetes Paternal Aunt         unsure which type or age of onset        Past Medical History:   Diagnosis Date   • Enlarged tonsils    • Miscarriage    • Sore throat, chronic         OB History      Para Term  AB Living    3 1 1 0 2 1    SAB TAB Ectopic Molar Multiple Live Births    0 0 0 0 0 1           Social History     Socioeconomic History   • Marital status:      Spouse name: Not on file   • Number of children: Not on file   • Years of education: Not on file   • Highest education level: Not on file   Tobacco Use   • Smoking status: Never Smoker   • Smokeless tobacco: Never Used   Substance and Sexual Activity   • Alcohol use: Yes     Comment: occassional   • Drug use: No   • Sexual activity: Defer     Partners: Male     Birth control/protection: Condom        Past Surgical History:   Procedure Laterality Date   • DILATATION AND CURETTAGE     • SHOULDER SURGERY Right    • TONSILLECTOMY AND ADENOIDECTOMY N/A 2018    Procedure: TONSILLECTOMY AND NASOPHARYNGOSCOPY   (PLEASE CALL PAT,);  Surgeon: Severiano Marshall MD;  Location: [facility];  Service: ENT   • WISDOM TOOTH EXTRACTION          Patient Active Problem List   Diagnosis   • Non morbid obesity due to excess calories   • Migraine without aura and without status migrainosus, not intractable   • PCOS (polycystic ovarian syndrome)   • Gestational hypertension, third trimester   • Gestational hypertension without significant proteinuria, antepartum   • Menorrhagia with regular cycle   • 33 weeks gestation of pregnancy   • Mild preeclampsia, third trimester   • 35 weeks gestation of pregnancy   • Preeclampsia, third trimester   • Preeclampsia        Documented Vitals    19 1119   BP: 116/80   Weight: 86.9 kg (191 lb 9.6 oz)   Height: 170.2 cm (67\")   PainSc: 0-No pain        Body mass index is 30.01 kg/m².    Physical Exam  Constitutional: Appears to be in no acute distress; Eyes: sclera normal; Endocrine system: thyroid " palpate is normal; Pulmonary system: lungs clear; Cardiovascular system: heart regular rate and rhythm; Gastrointestinal system: abdomen soft nontender, active bowel sounds; Urologic system: CVA negative; Psychiatric: appropriate insight; Neurologic: gait within normal limits external genitalia perineum well-healed speculum exam normal Pap smear performed bimanual examination negative    Assessment        Diagnosis Plan   1. Cervical cancer screening  Liquid-based Pap Smear, Screening         Plan       No orders of the defined types were placed in this encounter.          This document has been electronically signed by Diego Tate MD on March 22, 2019 9:04 PM

## 2019-03-27 LAB
GEN CATEG CVX/VAG CYTO-IMP: ABNORMAL
LAB AP CASE REPORT: ABNORMAL
LAB AP GYN ADDITIONAL INFORMATION: ABNORMAL
PATH INTERP SPEC-IMP: ABNORMAL
STAT OF ADQ CVX/VAG CYTO-IMP: ABNORMAL

## 2019-04-06 ENCOUNTER — TELEPHONE (OUTPATIENT)
Dept: OBSTETRICS AND GYNECOLOGY | Facility: CLINIC | Age: 24
End: 2019-04-06

## 2019-04-06 NOTE — TELEPHONE ENCOUNTER
Will have lab do high risk HPV.  Patient's been called made aware of results and will schedule colposcopy to

## 2019-04-08 ENCOUNTER — TELEPHONE (OUTPATIENT)
Dept: OBSTETRICS AND GYNECOLOGY | Facility: CLINIC | Age: 24
End: 2019-04-08

## 2019-04-08 DIAGNOSIS — R87.610 ATYPICAL SQUAMOUS CELLS OF UNDETERMINED SIGNIFICANCE (ASCUS) ON PAPANICOLAOU SMEAR OF CERVIX: Primary | ICD-10-CM

## 2019-04-08 NOTE — TELEPHONE ENCOUNTER
The pt returned my call.  I told her that her pap was abnormal, ASCUS and that we have added HPV testing on to her pap test and that it will be a few days, but we will call her with results.  We will schedule her for a colposcopy if needed when her results come in.

## 2019-04-10 LAB
HPV16 DNA SPEC QL NAA+PROBE: NEGATIVE
HPV18+45 E6+E7 MRNA CVX QL NAA+PROBE: NEGATIVE

## 2019-04-13 ENCOUNTER — TELEPHONE (OUTPATIENT)
Dept: OBSTETRICS AND GYNECOLOGY | Facility: CLINIC | Age: 24
End: 2019-04-13

## 2019-04-13 NOTE — TELEPHONE ENCOUNTER
Patient HPV testing got done for 14 and 18.  Complete high risk panel however review things with her I think with these out of the way it would be reasonable to wait and do a Pap smear in 6 months which is what she strongly wants to do risk benefits alternatives reviewed

## 2019-04-15 ENCOUNTER — TELEPHONE (OUTPATIENT)
Dept: OBSTETRICS AND GYNECOLOGY | Facility: CLINIC | Age: 24
End: 2019-04-15

## 2019-04-19 DIAGNOSIS — Z30.019 ENCOUNTER FOR FEMALE BIRTH CONTROL: Primary | ICD-10-CM

## 2019-06-25 ENCOUNTER — OFFICE VISIT (OUTPATIENT)
Dept: PODIATRY | Facility: CLINIC | Age: 24
End: 2019-06-25

## 2019-06-25 VITALS — HEIGHT: 67 IN | WEIGHT: 191 LBS | OXYGEN SATURATION: 98 % | BODY MASS INDEX: 29.98 KG/M2 | HEART RATE: 81 BPM

## 2019-06-25 DIAGNOSIS — L60.0 INGROWN TOENAIL: Primary | ICD-10-CM

## 2019-06-25 PROCEDURE — 11730 AVULSION NAIL PLATE SIMPLE 1: CPT | Performed by: PODIATRIST

## 2019-06-25 NOTE — PROGRESS NOTES
Vidhi Moody  1995  24 y.o. female     Patient presents today with a complaint of her right great toe pain. She states she was rocking her baby and her toe got pushed into the carpet.    6/25/2019  Chief Complaint   Patient presents with   • Right Foot - Toe Injury           History of Present Illness    Vidhi Moody is a 24 y.o.female who presents withchief complaint of right great toe pain.  Rates the pain as a 0 out of 10 currently.  However, pain is aggravated with pressure.  Relates to redness and drainage.  States that she has been soaking it which is helped.  She has no other pedal complaints.    Past Medical History:   Diagnosis Date   • Enlarged tonsils    • Miscarriage    • Sore throat, chronic          Past Surgical History:   Procedure Laterality Date   • DILATATION AND CURETTAGE     • SHOULDER SURGERY Right    • TONSILLECTOMY AND ADENOIDECTOMY N/A 5/11/2018    Procedure: TONSILLECTOMY AND NASOPHARYNGOSCOPY   (PLEASE CALL PAT,);  Surgeon: Severiano Marshall MD;  Location: [facility];  Service: ENT   • WISDOM TOOTH EXTRACTION           Family History   Problem Relation Age of Onset   • Hypertension Mother    • Hypertension Maternal Grandfather    • Hyperlipidemia Maternal Grandfather    • Heart disease Maternal Grandfather    • Breast cancer Paternal Grandmother 50   • Colon cancer Paternal Uncle 50   • Diabetes Paternal Aunt         unsure which type or age of onset       Allergies   Allergen Reactions   • Hydrocodone Nausea And Vomiting   • Adhesive Tape Hives   • Dilaudid [Hydromorphone] Irritability       Social History     Socioeconomic History   • Marital status:      Spouse name: Not on file   • Number of children: Not on file   • Years of education: Not on file   • Highest education level: Not on file   Tobacco Use   • Smoking status: Never Smoker   • Smokeless tobacco: Never Used   Substance and Sexual Activity   • Alcohol use: Yes     Comment: occassional   • Drug  "use: No   • Sexual activity: Defer     Partners: Male     Birth control/protection: Condom         Current Outpatient Medications   Medication Sig Dispense Refill   • Fenugreek 610 MG capsule Take 3 capsules by mouth 3 (Three) Times a Day.     • ibuprofen (ADVIL,MOTRIN) 600 MG tablet Take 1 tablet by mouth Every 8 (Eight) Hours As Needed for Mild Pain. 90 tablet 1   • norethindrone-ethinyl estradiol (ORTHO-NOVUM 1/35, 28,) 1-35 MG-MCG per tablet Take 1 tablet by mouth Daily. 28 tablet 12   • Prenatal Vit-Fe Fumarate-FA (PRENATAL VITAMIN 27-0.8) 27-0.8 MG tablet tablet Take  by mouth Daily.       No current facility-administered medications for this visit.      Review of Systems   Constitutional: Negative.    HENT: Negative.    Respiratory: Negative.    Gastrointestinal: Negative.    Musculoskeletal:        Right great toe pain    Psychiatric/Behavioral: Negative.        OBJECTIVE    Pulse 81   Ht 170.2 cm (67\")   Wt 86.6 kg (191 lb)   SpO2 98%   BMI 29.91 kg/m²         Physical Exam   Constitutional: She is oriented to person, place, and time. She appears well-developed and well-nourished. No distress.   HENT:   Head: Normocephalic and atraumatic.   Nose: Nose normal.   Eyes: Conjunctivae and EOM are normal. Pupils are equal, round, and reactive to light.   Pulmonary/Chest: Effort normal. No respiratory distress. She has no wheezes.   Neurological: She is alert and oriented to person, place, and time.   Skin: Skin is warm and dry. Capillary refill takes less than 2 seconds.   Psychiatric: She has a normal mood and affect. Her behavior is normal.   Vitals reviewed.    Right Lower Extremity:      Cardiovascular:    DP/PT pulses palpable    CFT brisk  to all digits     Musculoskeletal:  Muscle strength is 5/5 for all muscle groups tested   ROM of the 1st MTP is full without pain or crepitus  ROM of the ankle joint is full without pain or crepitus      Dermatological:   Skin is warm, dry and intact    Webspaces 1-4 " are clean, dry and intact.   No subcutaneous nodules or masses noted    Right hallux nail appears to be incurvated and ingrowing on the medial border.  There is erythema and edema.  Pain on direct palpation.  Scant drainage.    Neurological:   Sensation intact to light touch          Nail Removal  Date/Time: 6/25/2019 3:16 PM  Performed by: Max Juarez DPM  Authorized by: Max Juarez DPM   Consent: Verbal consent obtained. Written consent obtained.  Risks and benefits: risks, benefits and alternatives were discussed  Consent given by: patient  Patient identity confirmed: verbally with patient  Location: right foot  Location details: right big toe  Anesthesia: digital block    Anesthesia:  Local Anesthetic: lidocaine 2% with epinephrine and lidocaine 2% without epinephrine    Sedation:  Patient sedated: no    Preparation: skin prepped with Betadine  Amount removed: partial  Side: medial  Nail matrix removed: none  Dressing: antibiotic ointment and dressing applied  Patient tolerance: Patient tolerated the procedure well with no immediate complications              ASSESSMENT AND PLAN    Vidhi was seen today for toe injury.    Diagnoses and all orders for this visit:    Ingrown toenail      - Comprehensive foot and ankle exam performed  - Diagnosis, prevention and treatment of ingrown toenails discussed with patient, including risks and potential benefits of nail avulsion both temporary and permanent versus simple debridement.  - Patient elected for a partial temporary nail avulsion  - Dispensed aftercare instruction sheet  - All questions were answered and the patient is in agreement with the current treatment plan.  - RTC in 2 weeks           This document has been electronically signed by Max Juarez DPM on June 25, 2019 2:33 PM     6/25/2019  2:33 PM

## 2019-10-16 ENCOUNTER — LAB REQUISITION (OUTPATIENT)
Dept: LAB | Facility: HOSPITAL | Age: 24
End: 2019-10-16

## 2019-10-16 ENCOUNTER — OFFICE VISIT (OUTPATIENT)
Dept: FAMILY MEDICINE CLINIC | Facility: CLINIC | Age: 24
End: 2019-10-16

## 2019-10-16 VITALS
WEIGHT: 192.9 LBS | SYSTOLIC BLOOD PRESSURE: 138 MMHG | DIASTOLIC BLOOD PRESSURE: 90 MMHG | HEIGHT: 67 IN | BODY MASS INDEX: 30.28 KG/M2

## 2019-10-16 DIAGNOSIS — J02.9 ACUTE PHARYNGITIS, UNSPECIFIED ETIOLOGY: ICD-10-CM

## 2019-10-16 DIAGNOSIS — Z00.00 ENCOUNTER FOR GENERAL ADULT MEDICAL EXAMINATION WITHOUT ABNORMAL FINDINGS: ICD-10-CM

## 2019-10-16 DIAGNOSIS — R05.9 COUGH: ICD-10-CM

## 2019-10-16 DIAGNOSIS — J06.9 ACUTE URI: Primary | ICD-10-CM

## 2019-10-16 PROBLEM — O14.93 PREECLAMPSIA, THIRD TRIMESTER: Status: RESOLVED | Noted: 2019-02-15 | Resolved: 2019-10-16

## 2019-10-16 PROBLEM — O13.9 GESTATIONAL HYPERTENSION WITHOUT SIGNIFICANT PROTEINURIA, ANTEPARTUM: Status: RESOLVED | Noted: 2019-01-20 | Resolved: 2019-10-16

## 2019-10-16 PROBLEM — O14.03 MILD PREECLAMPSIA, THIRD TRIMESTER: Status: RESOLVED | Noted: 2019-02-03 | Resolved: 2019-10-16

## 2019-10-16 PROBLEM — O13.3 GESTATIONAL HYPERTENSION, THIRD TRIMESTER: Status: RESOLVED | Noted: 2019-01-11 | Resolved: 2019-10-16

## 2019-10-16 PROBLEM — E66.09 NON MORBID OBESITY DUE TO EXCESS CALORIES: Status: RESOLVED | Noted: 2017-01-17 | Resolved: 2019-10-16

## 2019-10-16 PROBLEM — E28.2 PCOS (POLYCYSTIC OVARIAN SYNDROME): Chronic | Status: ACTIVE | Noted: 2017-09-13

## 2019-10-16 PROBLEM — O14.90 PREECLAMPSIA: Status: RESOLVED | Noted: 2019-02-19 | Resolved: 2019-10-16

## 2019-10-16 PROBLEM — Z3A.33 33 WEEKS GESTATION OF PREGNANCY: Status: RESOLVED | Noted: 2019-02-03 | Resolved: 2019-10-16

## 2019-10-16 PROBLEM — N92.0 MENORRHAGIA WITH REGULAR CYCLE: Chronic | Status: ACTIVE | Noted: 2019-02-03

## 2019-10-16 PROBLEM — G43.009 MIGRAINE WITHOUT AURA AND WITHOUT STATUS MIGRAINOSUS, NOT INTRACTABLE: Status: RESOLVED | Noted: 2017-09-13 | Resolved: 2019-10-16

## 2019-10-16 PROBLEM — Z3A.35 35 WEEKS GESTATION OF PREGNANCY: Status: RESOLVED | Noted: 2019-02-09 | Resolved: 2019-10-16

## 2019-10-16 LAB
EXPIRATION DATE: NORMAL
INTERNAL CONTROL: NORMAL
Lab: NORMAL
S PYO AG THROAT QL: NEGATIVE

## 2019-10-16 PROCEDURE — 87880 STREP A ASSAY W/OPTIC: CPT | Performed by: FAMILY MEDICINE

## 2019-10-16 PROCEDURE — 99202 OFFICE O/P NEW SF 15 MIN: CPT | Performed by: FAMILY MEDICINE

## 2019-10-16 PROCEDURE — 36415 COLL VENOUS BLD VENIPUNCTURE: CPT | Performed by: PEDIATRICS

## 2019-10-16 RX ORDER — ALBUTEROL SULFATE 90 UG/1
2 AEROSOL, METERED RESPIRATORY (INHALATION) EVERY 4 HOURS PRN
Qty: 1 INHALER | Refills: 2 | OUTPATIENT
Start: 2019-10-16 | End: 2020-05-04

## 2019-10-16 NOTE — PROGRESS NOTES
Subjective   Vidhi Moody is a 24 y.o. female.     History of Present Illness   request evaluation for throat cough congestion coryza to 3 days.  Exposed to same.  Status post tonsillectomy last year.  No smoke exposure.  Recent childbirth noted.    HPI    The following portions of the patient's history were reviewed and updated as appropriate: allergies, current medications, past family history, past medical history, past social history, past surgical history and problem list.    Review of Systems  Review of Systems   Constitutional: Negative for activity change, appetite change, fatigue and unexpected weight change.   HENT: Positive for postnasal drip, rhinorrhea, sinus pressure and sore throat. Negative for trouble swallowing and voice change.    Eyes: Negative for redness and visual disturbance.   Respiratory: Positive for cough and chest tightness. Negative for wheezing.    Cardiovascular: Negative for chest pain and palpitations.   Gastrointestinal: Negative for abdominal pain, constipation, diarrhea, nausea and vomiting.   Genitourinary: Negative for urgency.   Musculoskeletal: Negative for joint swelling.   Neurological: Negative for syncope and headaches.   Hematological: Negative for adenopathy.   Psychiatric/Behavioral: Negative for sleep disturbance.       Objective   Physical Exam  Physical Exam   Constitutional: She is oriented to person, place, and time. She appears well-developed.   HENT:   Head: Normocephalic.   Right Ear: External ear normal.   Left Ear: External ear normal.   Nose: Nose normal.   Mouth/Throat: Posterior oropharyngeal erythema present.   Marked posterior pharyngeal wall erythema.   Eyes: Pupils are equal, round, and reactive to light.   Neck: Normal range of motion. No thyromegaly present.   Cardiovascular: Normal rate, regular rhythm, normal heart sounds and intact distal pulses. Exam reveals no gallop and no friction rub.   No murmur heard.  Pulmonary/Chest: Breath  "sounds normal.   Abdominal: Soft. She exhibits no distension and no mass. There is no tenderness.   Musculoskeletal: Normal range of motion.   Lymphadenopathy:     She has cervical adenopathy (Shotty anterior nodes).   Neurological: She is alert and oriented to person, place, and time. She has normal reflexes.   Skin: Skin is warm and dry.   Psychiatric: She has a normal mood and affect.     Strep negative    Visit Vitals  /90 (BP Location: Left arm, Patient Position: Standing, Cuff Size: Large Adult)   Ht 170.2 cm (67\")   Wt 87.5 kg (192 lb 14.4 oz)   LMP 10/01/2019 (Approximate)   BMI 30.21 kg/m²     Body mass index is 30.21 kg/m².      Assessment/Plan   Vidhi was seen today for sore throat, cough, generalized body aches and chest feels tight.    Diagnoses and all orders for this visit:    Acute URI    Acute pharyngitis, unspecified etiology  -     POC Rapid Strep A    Cough    Counseled on symptomatic relief gargles fluids clean air Robitussin etc. recheck directed counseled flu vaccine next week  "

## 2019-10-24 ENCOUNTER — TELEPHONE (OUTPATIENT)
Dept: OBSTETRICS AND GYNECOLOGY | Facility: CLINIC | Age: 24
End: 2019-10-24

## 2019-10-25 ENCOUNTER — TELEPHONE (OUTPATIENT)
Dept: OBSTETRICS AND GYNECOLOGY | Facility: CLINIC | Age: 24
End: 2019-10-25

## 2019-10-25 RX ORDER — NORGESTIMATE AND ETHINYL ESTRADIOL 0.25-0.035
1 KIT ORAL DAILY
Qty: 1 TABLET | Refills: 12 | Status: SHIPPED | OUTPATIENT
Start: 2019-10-25 | End: 2019-11-07

## 2019-10-25 NOTE — TELEPHONE ENCOUNTER
Return phone call about oral contraceptives send in for Sprintec.  Reminded her importance follow-up for repeat Pap smear in the near future

## 2019-11-06 ENCOUNTER — OFFICE VISIT (OUTPATIENT)
Dept: OBSTETRICS AND GYNECOLOGY | Facility: CLINIC | Age: 24
End: 2019-11-06

## 2019-11-06 VITALS
SYSTOLIC BLOOD PRESSURE: 120 MMHG | HEIGHT: 67 IN | WEIGHT: 192.2 LBS | DIASTOLIC BLOOD PRESSURE: 84 MMHG | BODY MASS INDEX: 30.17 KG/M2

## 2019-11-06 DIAGNOSIS — R87.610 ATYPICAL SQUAMOUS CELLS OF UNDETERMINED SIGNIFICANCE (ASCUS) ON PAPANICOLAOU SMEAR OF CERVIX: Primary | ICD-10-CM

## 2019-11-06 DIAGNOSIS — Z30.41 ORAL CONTRACEPTIVE PILL SURVEILLANCE: ICD-10-CM

## 2019-11-06 PROCEDURE — 99213 OFFICE O/P EST LOW 20 MIN: CPT | Performed by: OBSTETRICS & GYNECOLOGY

## 2019-11-06 PROCEDURE — G0123 SCREEN CERV/VAG THIN LAYER: HCPCS | Performed by: OBSTETRICS & GYNECOLOGY

## 2019-11-10 PROBLEM — R87.610 ATYPICAL SQUAMOUS CELLS OF UNDETERMINED SIGNIFICANCE (ASCUS) ON PAPANICOLAOU SMEAR OF CERVIX: Status: ACTIVE | Noted: 2019-11-10

## 2019-11-10 PROBLEM — Z30.41 ORAL CONTRACEPTIVE PILL SURVEILLANCE: Status: ACTIVE | Noted: 2019-11-10

## 2019-11-10 NOTE — PROGRESS NOTES
Vidhi Moody is a 24 y.o. y/o female.     Chief Complaint: Follow-up Pap smear ASCUS on oral contraceptives    HPI:   24 y.o. .  Patient's last menstrual period was 10/23/2019..  Patient's last Pap smear was ASCUS 16 1845 negative.  Here in follow-up.  On oral contraceptive Sprintec doing well no complaints normal periods no headaches visual changes other problems all contraceptive review of systems negative          Review of Systems   ROS:  CNS: No history of brain malignancy.  HEENT: No history of throat cancer.  Eye: No history of retinal cancer.  Pulmonary: No history of lung cancer.                                                                                 Cardiovascular: No history of cardiac tumors.  Gastrointestinal: No history of small bowel tumors.  Renal: No history of kidney malignancy.  Musculoskeletal: No history of smooth muscle tumors.  Lymphatics: No history of Hodgkin's disease.  Endocrine: No history of thyroid malignancy.    The following portions of the patient's history were reviewed and updated as appropriate: allergies, current medications, past family history, past medical history, past social history, past surgical history and problem list.    Allergies   Allergen Reactions   • Hydrocodone Nausea And Vomiting   • Adhesive Tape Hives   • Dilaudid [Hydromorphone] Irritability        Outpatient Medications Prior to Visit   Medication Sig Dispense Refill   • albuterol sulfate  (90 Base) MCG/ACT inhaler Inhale 2 puffs Every 4 (Four) Hours As Needed for Wheezing. 1 inhaler 2   • norgestimate-ethinyl estradiol (SPRINTEC 28) 0.25-35 MG-MCG per tablet Take 1 tablet by mouth Daily for 13 doses. 1 tablet 12     No facility-administered medications prior to visit.         The patient has a family history of   Family History   Problem Relation Age of Onset   • Hypertension Mother    • Hyperlipidemia Mother    • No Known Problems Father    • Hypertension Maternal  "Grandfather    • Hyperlipidemia Maternal Grandfather    • Heart disease Maternal Grandfather    • Breast cancer Paternal Grandmother 50   • Colon cancer Paternal Uncle 50   • Diabetes Paternal Aunt         unsure which type or age of onset        Past Medical History:   Diagnosis Date   • Abnormal Pap smear of cervix    • Enlarged tonsils    • Miscarriage    • Sore throat, chronic         OB History      Para Term  AB Living    3 1 1 0 2 1    SAB TAB Ectopic Molar Multiple Live Births    0 0 0 0 0 1           Social History     Socioeconomic History   • Marital status:      Spouse name: Not on file   • Number of children: Not on file   • Years of education: Not on file   • Highest education level: Not on file   Tobacco Use   • Smoking status: Never Smoker   • Smokeless tobacco: Never Used   Substance and Sexual Activity   • Alcohol use: Yes     Comment: occassional   • Drug use: No   • Sexual activity: Defer     Partners: Male     Birth control/protection: Condom        Past Surgical History:   Procedure Laterality Date   • DILATATION AND CURETTAGE     • SHOULDER SURGERY Right    • TONSILLECTOMY     • TONSILLECTOMY AND ADENOIDECTOMY N/A 2018    Procedure: TONSILLECTOMY AND NASOPHARYNGOSCOPY   (PLEASE CALL [facility],);  Surgeon: Severiano Marshall MD;  Location: [facility];  Service: ENT   • WISDOM TOOTH EXTRACTION          Patient Active Problem List   Diagnosis   • PCOS (polycystic ovarian syndrome)   • Menorrhagia with regular cycle   • BMI 30.0-30.9,adult   • Atypical squamous cells of undetermined significance (ASCUS) on Papanicolaou smear of cervix   • Oral contraceptive pill surveillance        Documented Vitals    19 1331   BP: 120/84   Weight: 87.2 kg (192 lb 3.2 oz)   Height: 170.2 cm (67\")   PainSc: 0-No pain        Body mass index is 30.1 kg/m².    Physical Exam  Constitutional: Appears to be in no acute distress; Eyes: sclera normal; Endocrine system: thyroid palpate is normal; " Pulmonary system: lungs clear; Cardiovascular system: heart regular rate and rhythm; Gastrointestinal system: abdomen soft nontender, active bowel sounds; Urologic system: CVA negative; Psychiatric: appropriate insight; Neurologic: gait within normal limits female genital system external genitalia normal vagina normal cervix no gross lesion Pap smear performed bimanual examination negative    Laboratory Data:   Lab Results - Last 18 Months   Lab Units 02/19/19 1917 02/18/19  0840 02/15/19  0747 02/11/19  0848 02/08/19  1024   GLUCOSE mg/dL 103* 109* 78 120* 83   BUN mg/dL 4* 4* 5* 4* 6*   CREATININE mg/dL 0.44* 0.55 0.51 0.44* 0.48*   SODIUM mmol/L 130* 135* 135* 136* 135*   POTASSIUM mmol/L 3.5 4.3 3.9 3.3* 4.0   CHLORIDE mmol/L 100 103 103 104 102   CO2 mmol/L 22.0 22.0 24.0 21.0* 22.0   CALCIUM mg/dL 9.6 9.7 9.4 9.5 10.4*   TOTAL PROTEIN g/dL 6.2* 7.0 6.6 6.5 7.2   ALBUMIN g/dL 3.60 3.70 3.50 3.70 4.20   ALT (SGPT) U/L 10 <6* 8* <6* 13   AST (SGOT) U/L 21 18 19 20 21   ALK PHOS U/L 126 122 113 119 131*   BILIRUBIN mg/dL 0.4 0.3 0.3 0.3 0.4   EGFR IF NONAFRICN AM mL/min/1.73 174* 135 147 174* 158   GLOBULIN gm/dL 2.6 3.3 3.1 2.8 3.0   A/G RATIO g/dL 1.4 1.1 1.1 1.3 1.4   BUN / CREAT RATIO  9.1 7.3 9.8 9.1 12.5   ANION GAP mmol/L 8.0 10.0 8.0 11.0 11.0     Lab Results - Last 18 Months   Lab Units 02/21/19  0547 02/19/19 1917 02/18/19  0840 02/15/19  0747 02/11/19  0848   WBC 10*3/mm3 12.90* 14.77* 12.31* 13.61* 11.24*   RBC 10*6/mm3 3.48* 3.68* 4.12 3.86 4.03   HEMOGLOBIN g/dL 10.1* 10.7* 11.9* 11.4* 11.6*   HEMATOCRIT % 29.2* 30.3* 34.8 32.2* 33.9*   MCV fL 83.9 82.3 84.5 83.4 84.1   MCH pg 29.0 29.1 28.9 29.5 28.8   MCHC g/dL 34.6 35.3 34.2 35.4 34.2   RDW % 13.5 13.2 13.4 13.5 13.5   RDW-SD fl 41.3 38.9 41.0 40.3 41.2   MPV fL 10.7 10.8 10.9 10.6 10.5   PLATELETS 10*3/mm3 137* 142 159 145 126*     No results for input(s): HCGQUAL in the last 04452 hours.    Assessment        Diagnosis Plan   1. Atypical  squamous cells of undetermined significance (ASCUS) on Papanicolaou smear of cervix  Liquid-based Pap Smear, Screening   2. Oral contraceptive pill surveillance           Plan       No orders of the defined types were placed in this encounter.            This document has been electronically signed by Diego Tate MD on November 10, 2019 12:51 PM    Please note that portions of this note were completed with a voice recognition program.

## 2019-11-12 LAB
GEN CATEG CVX/VAG CYTO-IMP: NORMAL
LAB AP CASE REPORT: NORMAL
LAB AP GYN ADDITIONAL INFORMATION: NORMAL
PATH INTERP SPEC-IMP: NORMAL
STAT OF ADQ CVX/VAG CYTO-IMP: NORMAL

## 2020-05-07 ENCOUNTER — TELEPHONE (OUTPATIENT)
Dept: OBSTETRICS AND GYNECOLOGY | Facility: CLINIC | Age: 25
End: 2020-05-07

## 2020-05-08 ENCOUNTER — TELEPHONE (OUTPATIENT)
Dept: OBSTETRICS AND GYNECOLOGY | Facility: CLINIC | Age: 25
End: 2020-05-08

## 2020-05-08 NOTE — TELEPHONE ENCOUNTER
PATIENT WANTED TO KNOW IF SHE COULD TAKE REGLAN AND VITAMIN B6 FOR NAUSEA. I TOLD HER THAT SHE COULD TAKE IT. SHE SAID SHE TOOK THE VITAMIN B6 THIS MORNING. I TOLD HER THAT WOULD BE FINE.

## 2020-05-21 DIAGNOSIS — Z34.90 PREGNANCY, UNSPECIFIED GESTATIONAL AGE: Primary | ICD-10-CM

## 2020-05-27 ENCOUNTER — LAB (OUTPATIENT)
Dept: LAB | Facility: HOSPITAL | Age: 25
End: 2020-05-27

## 2020-05-27 ENCOUNTER — INITIAL PRENATAL (OUTPATIENT)
Dept: OBSTETRICS AND GYNECOLOGY | Facility: CLINIC | Age: 25
End: 2020-05-27

## 2020-05-27 ENCOUNTER — ROUTINE PRENATAL (OUTPATIENT)
Dept: OBSTETRICS AND GYNECOLOGY | Facility: CLINIC | Age: 25
End: 2020-05-27

## 2020-05-27 VITALS — BODY MASS INDEX: 29.23 KG/M2 | DIASTOLIC BLOOD PRESSURE: 76 MMHG | SYSTOLIC BLOOD PRESSURE: 120 MMHG | WEIGHT: 186.6 LBS

## 2020-05-27 DIAGNOSIS — Z34.90 PREGNANCY, UNSPECIFIED GESTATIONAL AGE: ICD-10-CM

## 2020-05-27 DIAGNOSIS — Z3A.08 8 WEEKS GESTATION OF PREGNANCY: ICD-10-CM

## 2020-05-27 DIAGNOSIS — O30.041 DICHORIONIC DIAMNIOTIC TWIN PREGNANCY IN FIRST TRIMESTER: ICD-10-CM

## 2020-05-27 DIAGNOSIS — O09.299 HX OF PREECLAMPSIA, PRIOR PREGNANCY, CURRENTLY PREGNANT: ICD-10-CM

## 2020-05-27 DIAGNOSIS — Z32.01 PREGNANCY CONFIRMED BY POSITIVE URINE TEST: Primary | ICD-10-CM

## 2020-05-27 DIAGNOSIS — O09.299 HX OF PREECLAMPSIA, PRIOR PREGNANCY, CURRENTLY PREGNANT, UNSPECIFIED TRIMESTER: Primary | ICD-10-CM

## 2020-05-27 PROBLEM — O30.001 TWIN GESTATION IN FIRST TRIMESTER: Status: ACTIVE | Noted: 2020-05-27

## 2020-05-27 LAB
ABO GROUP BLD: NORMAL
AMPHET+METHAMPHET UR QL: NEGATIVE
AMPHETAMINES UR QL: NEGATIVE
BARBITURATES UR QL SCN: NEGATIVE
BASOPHILS # BLD AUTO: 0.02 10*3/MM3 (ref 0–0.2)
BASOPHILS NFR BLD AUTO: 0.2 % (ref 0–1.5)
BENZODIAZ UR QL SCN: NEGATIVE
BLD GP AB SCN SERPL QL: NEGATIVE
BUPRENORPHINE SERPL-MCNC: NEGATIVE NG/ML
CANNABINOIDS SERPL QL: NEGATIVE
COCAINE UR QL: NEGATIVE
DEPRECATED RDW RBC AUTO: 36.7 FL (ref 37–54)
EOSINOPHIL # BLD AUTO: 0.04 10*3/MM3 (ref 0–0.4)
EOSINOPHIL NFR BLD AUTO: 0.4 % (ref 0.3–6.2)
ERYTHROCYTE [DISTWIDTH] IN BLOOD BY AUTOMATED COUNT: 12.8 % (ref 12.3–15.4)
HBV SURFACE AG SERPL QL IA: NORMAL
HCT VFR BLD AUTO: 38.2 % (ref 34–46.6)
HCV AB SER DONR QL: NORMAL
HGB BLD-MCNC: 13.5 G/DL (ref 12–15.9)
HIV1+2 AB SER QL: NORMAL
HOLD SPECIMEN: NORMAL
IMM GRANULOCYTES # BLD AUTO: 0.03 10*3/MM3 (ref 0–0.05)
IMM GRANULOCYTES NFR BLD AUTO: 0.3 % (ref 0–0.5)
LYMPHOCYTES # BLD AUTO: 2.52 10*3/MM3 (ref 0.7–3.1)
LYMPHOCYTES NFR BLD AUTO: 26.4 % (ref 19.6–45.3)
Lab: NORMAL
MCH RBC QN AUTO: 28.4 PG (ref 26.6–33)
MCHC RBC AUTO-ENTMCNC: 35.3 G/DL (ref 31.5–35.7)
MCV RBC AUTO: 80.3 FL (ref 79–97)
METHADONE UR QL SCN: NEGATIVE
MONOCYTES # BLD AUTO: 0.38 10*3/MM3 (ref 0.1–0.9)
MONOCYTES NFR BLD AUTO: 4 % (ref 5–12)
NEUTROPHILS # BLD AUTO: 6.55 10*3/MM3 (ref 1.7–7)
NEUTROPHILS NFR BLD AUTO: 68.7 % (ref 42.7–76)
NRBC BLD AUTO-RTO: 0 /100 WBC (ref 0–0.2)
OPIATES UR QL: NEGATIVE
OXYCODONE UR QL SCN: NEGATIVE
PCP UR QL SCN: NEGATIVE
PLATELET # BLD AUTO: 230 10*3/MM3 (ref 140–450)
PMV BLD AUTO: 10.1 FL (ref 6–12)
PROPOXYPH UR QL: NEGATIVE
RBC # BLD AUTO: 4.76 10*6/MM3 (ref 3.77–5.28)
RH BLD: POSITIVE
RPR SER QL: NORMAL
RUBV IGG SERPL IA-ACNC: POSITIVE
TRICYCLICS UR QL SCN: NEGATIVE
WBC NRBC COR # BLD: 9.54 10*3/MM3 (ref 3.4–10.8)

## 2020-05-27 PROCEDURE — 81003 URINALYSIS AUTO W/O SCOPE: CPT

## 2020-05-27 PROCEDURE — 86803 HEPATITIS C AB TEST: CPT

## 2020-05-27 PROCEDURE — 80306 DRUG TEST PRSMV INSTRMNT: CPT

## 2020-05-27 PROCEDURE — 36415 COLL VENOUS BLD VENIPUNCTURE: CPT

## 2020-05-27 PROCEDURE — 0502F SUBSEQUENT PRENATAL CARE: CPT | Performed by: OBSTETRICS & GYNECOLOGY

## 2020-05-27 PROCEDURE — 87086 URINE CULTURE/COLONY COUNT: CPT

## 2020-05-27 PROCEDURE — 80081 OBSTETRIC PANEL INC HIV TSTG: CPT

## 2020-05-27 RX ORDER — DIPHENHYDRAMINE HYDROCHLORIDE 25 MG/1
25 CAPSULE ORAL DAILY
Qty: 60 TABLET | Refills: 0 | Status: SHIPPED | OUTPATIENT
Start: 2020-05-27 | End: 2020-09-24

## 2020-05-27 NOTE — PROGRESS NOTES
I spent approximately 45 minutes with the patient acquiring the health and history intake and discussing topics related to healthy lifestyle. This is her fourth  Pregnancy. She had 2 miscarriages. Her last pregnancy, she was induced at 37wks due to preeclampsia.  A newob bag is given. The 1st trimester teaching was done with the patient. We discussed a healthy diet and exercise and what is recommended. I also discussed Listeriosis and Toxoplasmosis. The patient states she does not eat any kind of fish, so mercury was not discussed. Informed patient not to be in hot tubs, saunas, or tanning beds. We discussed that spotting may occur after intercourse which is common, but if heavy bleeding like a period occurs to [facility] or hospital if clinic is closed.  I encouraged her to make an appointment with the dentist if she has not had a dental exam and cleaning in the last 6 months. The patient denies use of alcohol, illicit drug use, and tobacco smoking. We discussed the policy of a UDS being done at the first prenatal visit and at the time of delivery. She is unsure if she will try to breastfeed this time. She says her daughter did not want to latch, so she pumped for 7 weeks before switching to formula. She had seen Rohini Parsons, lactation consultant, during that time. I informed the patient that Rohini Parsons is still here and will be happy to help her if she decides to breastfeed. She filled out the health department referral form and depression screening questionnaire. I informed patient that group prenatal education classes are offered here. I instructed patient to let the provider know if she would like to join a group after EDC is established. A Centering Pregnancy pamphlet is given.  I discussed with the patient that a pediatrician needs to be chosen prior to delivery for the infant to have an appointment scheduled before leaving the hospital. She says her daughter sees Felecia Walsh and will probably  continue with her. She had labs today prior to her appointment with me. Due to her history of preeclampsia, a 24hr urine protein and a CMP are added. All questions were answered at this time.

## 2020-05-28 LAB
BACTERIA SPEC AEROBE CULT: NORMAL
BILIRUB UR QL STRIP: NEGATIVE
CLARITY UR: CLEAR
COLOR UR: YELLOW
GLUCOSE UR STRIP-MCNC: NEGATIVE MG/DL
HGB UR QL STRIP.AUTO: NEGATIVE
KETONES UR QL STRIP: NEGATIVE
LEUKOCYTE ESTERASE UR QL STRIP.AUTO: NEGATIVE
NITRITE UR QL STRIP: NEGATIVE
PH UR STRIP.AUTO: 6.5 [PH] (ref 5–8)
PROT UR QL STRIP: NEGATIVE
SP GR UR STRIP: 1.02 (ref 1–1.03)
UROBILINOGEN UR QL STRIP: NORMAL

## 2020-05-28 NOTE — PROGRESS NOTES
[facility]   Obstetrics Visit    CHIEF COMPLAINT:  New prenatal visit    HISTORY OF PRESENT ILLNESS:  Vidhi Moody is a 25 y.o. y/o  at 8w4d by LMP (Patient's last menstrual period was 2020 (exact date).).  This was a  planned pregnancy and the patient is supported by her  .  Reports   nausea with  vomiting.   Requests vitamin B6 reports breast tenderness.  She denies any vaginal bleeding.  She has   started taking a prenatal vitamin.    REVIEW OF SYSTEMS  Review of Systems 10 point review of system negative except as above    PRENATAL RISK FACTORS   Problems (from 20 to present)     Problem Noted Resolved    Twin gestation in first trimester 2020 by Diego Tate MD No          DATING CRITERIA:  LMP (3/26/20) -- PABLO 2020  1TUS (2020at 8w4d) -- PABLO 2021    OBSTETRIC HISTORY:  OB History    Para Term  AB Living   4 1 1 0 2 1   SAB TAB Ectopic Molar Multiple Live Births   0 0 0 0 0 1      # Outcome Date GA Lbr Tano/2nd Weight Sex Delivery Anes PTL Lv   4 Current            3 Term 19 37w1d / 00:29 2807 g (6 lb 3 oz) F Vag-Spont EPI N CAYLA   2 AB  5w0d    SAB      1 AB 2016 9w0d    SAB         Birth Comments: D&C performed at [facility] in [place]     GYN HISTORY:  Denies h/o sexually transmitted infections/pelvic inflammatory disease  Denies h/o abnormal pap smears  Last pap smear:   Last Completed Pap Smear       Status Date      PAP SMEAR Done 2019 LIQUID-BASED PAP SMEAR, SCREENING     Patient has more history with this topic...        Denies h/o gynecologic surgeries, including biopsies of the cervix    PAST MEDICAL HISTORY:  Past Medical History:   Diagnosis Date   • Abnormal Pap smear of cervix    • Enlarged tonsils    • Miscarriage    • Ovarian cyst    • Preeclampsia    • Sore throat, chronic    • Varicella      PAST SURGICAL HISTORY:  Past Surgical History:   Procedure Laterality Date   • DILATATION AND  CURETTAGE     • SHOULDER SURGERY Right    • TONSILLECTOMY     • TONSILLECTOMY AND ADENOIDECTOMY N/A 5/11/2018    Procedure: TONSILLECTOMY AND NASOPHARYNGOSCOPY   (PLEASE CALL PAT,);  Surgeon: Severiano Marshall MD;  Location: [facility];  Service: ENT   • WISDOM TOOTH EXTRACTION       FAMILY HISTORY:  Family History   Problem Relation Age of Onset   • Hypertension Mother    • Hyperlipidemia Mother    • Thyroid nodules Mother    • No Known Problems Father    • No Known Problems Maternal Grandmother    • Hypertension Maternal Grandfather    • Hyperlipidemia Maternal Grandfather    • Heart disease Maternal Grandfather    • Breast cancer Paternal Grandmother 50   • No Known Problems Paternal Grandfather    • Colon cancer Paternal Uncle 50   • Diabetes Paternal Aunt         unsure which type or age of onset   • No Known Problems Daughter      SOCIAL HISTORY:  Social History     Socioeconomic History   • Marital status:      Spouse name: Not on file   • Number of children: Not on file   • Years of education: Not on file   • Highest education level: Not on file   Tobacco Use   • Smoking status: Former Smoker   • Smokeless tobacco: Never Used   Substance and Sexual Activity   • Alcohol use: Not Currently     Comment: occassional   • Drug use: No   • Sexual activity: Yes     Partners: Male     Birth control/protection: Condom     Comment: last pap smear 11/6/2019 negative      GENETIC SCREENING:  Age >34 yo as of PABLO: Not applicable  Thalassemia: Denies  NTD: Denies  CHD: Denies  Down Syndrome/MR/Fragile X/Autism: Denies  Ashkenazi Orthodoxy with Brayan-Sachs, [place], familial dysautonomia: Denies  Sickle cell disease or trait: Denies  Hemophilia: Denies  Muscular dystrophy: Denies  Cystic fibrosis: Denies  Sobia's chorea: Denies  Birth defects: Denied  Genetic/chromosomal disorders: Denies    INFECTION HISTORY:  TB exposure: Denies  HSV: Denies  Illness since LMP: Denies  Prior GBS infected child: Denies  STIs:  Denies    ALLERGIES:  Allergies   Allergen Reactions   • Hydrocodone Nausea And Vomiting   • Adhesive Tape Hives   • Dilaudid [Hydromorphone] Irritability       MEDICATIONS:  Prior to Admission medications    Medication Sig Start Date End Date Taking? Authorizing Provider   Prenatal Vit-Fe Fumarate-FA (PRENATAL, CLASSIC, VITAMIN) 28-0.8 MG tablet tablet Take  by mouth Daily.    Emergency, Nurse Phil, RN   vitamin B-6 (PYRIDOXINE) 25 MG tablet Take 1 tablet by mouth Daily. 5/27/20   Diego Tate MD       PHYSICAL EXAM:   LMP 03/26/2020 (Exact Date)   General: Alert, healthy, no distress, well nourished and well developed.  Neurologic: Alert, oriented to person, place, and time.  Gait normal.  Cranial nerves II-XII grossly intact.  HEENT: Normocephalic, atraumatic.  Extraocular muscles intact, pupils equal and reactive x2.    Teeth: Normal hygiene.  Neck: Supple, no adenopathy, thyroid normal size, non-tender, without nodularity, trachea midline.  Breasts: No masses, skin dimpling, skin retraction, nipple discharge, or asymmetry bilaterally.  Lungs: Normal respiratory effort.  Clear to auscultation bilaterally.  No wheezes, rhonci, or rales.  Heart: Regular rate and rhythm.  No murmer, rub or gallop.  Abdomen: Soft, non-tender, non-distended,no masses, no hepatosplenomegaly, no hernia.  Skin: No rash, no lesions.  Extremities: No cyanosis, clubbing or edema.  PELVIC EXAM:  External Genitalia/Vulva: Anatomy is normal, no significant redness of labia, no discharge on vulvar tissues, Seabeck's and Bartholin's glands are normal, no ulcers, no condylomatous lesions.  Urethra: Normal, no lesions.  Vagina: Vaginal tissues are not inflamed, normal color and texture, no significant discharge present.  Cervix: Normal in appearance without lesions or purulent discharge, no cervical motion tenderness.  Uterus: Normal size, shape, and consistency.  Adnexa: Normal size and shape bilaterally, no palpable mass bilaterally and  non-tender bilaterally.    Bedside ultrasound performed by myself which shows the findings below:      IMPRESSION:  Vidhi Moody is a 25 y.o.  at 8w4d for a new prenatal visit.    PLAN:  1.  IUP at 8w4d  - Options counseling performed and patient desires continuation of pregnancy to term   - Prenatal labs ordered  - Genetic testing, including cystic fibrosis, was discussed and patient and offered let us know  - Continue  prenatal vitamins  - Weight gain counseling performed.   - Pregravid BMI 25-29.9: Recommend 15-25 lb  - Return to clinic in 4 weeks for return prenatal visit  - Reviewed COVID-19 visitation policy  - Reviewed COVID-19 precautions     Diagnosis Plan   1. Pregnancy confirmed by positive urine test  US Ob Transvaginal   2. Dichorionic diamniotic twin pregnancy in first trimester   ultrasound is strongly suggestive of dichorionic diamniotic twins.  We will plan for ultrasound in a couple of weeks for confirmation   3. 8 weeks gestation of pregnancy     History of preeclampsia developed at 32 weeks delivered at 37 last pregnancy will plan for aspirin beginning about 36 weeks  Diego Tate MD  2020  20:45

## 2020-06-17 ENCOUNTER — ROUTINE PRENATAL (OUTPATIENT)
Dept: OBSTETRICS AND GYNECOLOGY | Facility: CLINIC | Age: 25
End: 2020-06-17

## 2020-06-17 ENCOUNTER — LAB (OUTPATIENT)
Dept: LAB | Facility: HOSPITAL | Age: 25
End: 2020-06-17

## 2020-06-17 VITALS — DIASTOLIC BLOOD PRESSURE: 78 MMHG | WEIGHT: 186.6 LBS | BODY MASS INDEX: 29.23 KG/M2 | SYSTOLIC BLOOD PRESSURE: 124 MMHG

## 2020-06-17 DIAGNOSIS — O09.299 HX OF PREECLAMPSIA, PRIOR PREGNANCY, CURRENTLY PREGNANT, UNSPECIFIED TRIMESTER: ICD-10-CM

## 2020-06-17 DIAGNOSIS — O30.041 DICHORIONIC DIAMNIOTIC TWIN PREGNANCY IN FIRST TRIMESTER: ICD-10-CM

## 2020-06-17 DIAGNOSIS — Z3A.11 11 WEEKS GESTATION OF PREGNANCY: ICD-10-CM

## 2020-06-17 DIAGNOSIS — Z34.90 PREGNANCY, UNSPECIFIED GESTATIONAL AGE: Primary | ICD-10-CM

## 2020-06-17 DIAGNOSIS — O09.299 HX OF PREECLAMPSIA, PRIOR PREGNANCY, CURRENTLY PREGNANT: ICD-10-CM

## 2020-06-17 LAB
ALBUMIN SERPL-MCNC: 4.2 G/DL (ref 3.5–5.2)
ALBUMIN/GLOB SERPL: 1.4 G/DL
ALP SERPL-CCNC: 62 U/L (ref 39–117)
ALT SERPL W P-5'-P-CCNC: 16 U/L (ref 1–33)
ANION GAP SERPL CALCULATED.3IONS-SCNC: 10.9 MMOL/L (ref 5–15)
AST SERPL-CCNC: 16 U/L (ref 1–32)
BILIRUB SERPL-MCNC: 0.4 MG/DL (ref 0.2–1.2)
BUN BLD-MCNC: 6 MG/DL (ref 6–20)
BUN/CREAT SERPL: 9.1 (ref 7–25)
CALCIUM SPEC-SCNC: 9.8 MG/DL (ref 8.6–10.5)
CHLORIDE SERPL-SCNC: 102 MMOL/L (ref 98–107)
CO2 SERPL-SCNC: 21.1 MMOL/L (ref 22–29)
CREAT BLD-MCNC: 0.66 MG/DL (ref 0.57–1)
GFR SERPL CREATININE-BSD FRML MDRD: 107 ML/MIN/1.73
GLOBULIN UR ELPH-MCNC: 3 GM/DL
GLUCOSE BLD-MCNC: 119 MG/DL (ref 65–99)
POTASSIUM BLD-SCNC: 4.1 MMOL/L (ref 3.5–5.2)
PROT 24H UR-MRATE: 193.5 MG/24HOURS (ref 0–150)
PROT SERPL-MCNC: 7.2 G/DL (ref 6–8.5)
SODIUM BLD-SCNC: 134 MMOL/L (ref 136–145)

## 2020-06-17 PROCEDURE — 87491 CHLMYD TRACH DNA AMP PROBE: CPT | Performed by: OBSTETRICS & GYNECOLOGY

## 2020-06-17 PROCEDURE — 80053 COMPREHEN METABOLIC PANEL: CPT

## 2020-06-17 PROCEDURE — 36415 COLL VENOUS BLD VENIPUNCTURE: CPT

## 2020-06-17 PROCEDURE — 0502F SUBSEQUENT PRENATAL CARE: CPT | Performed by: OBSTETRICS & GYNECOLOGY

## 2020-06-17 PROCEDURE — 84156 ASSAY OF PROTEIN URINE: CPT

## 2020-06-17 PROCEDURE — 81050 URINALYSIS VOLUME MEASURE: CPT

## 2020-06-17 PROCEDURE — 87591 N.GONORRHOEAE DNA AMP PROB: CPT | Performed by: OBSTETRICS & GYNECOLOGY

## 2020-06-17 PROCEDURE — 87661 TRICHOMONAS VAGINALIS AMPLIF: CPT | Performed by: OBSTETRICS & GYNECOLOGY

## 2020-06-17 NOTE — PROGRESS NOTES
CC: Prenatal visit    Vidhi Moody is a 25 y.o.  at 11w4d.  Doing well.  Denies contractions, LOF, or VB.  Reports good FM.    /78   Wt 84.6 kg (186 lb 9.6 oz)   LMP 2020 (Exact Date)   BMI 29.23 kg/m²   SVE: Not done     Fetal Heart Rate: 160/165     Problems (from 20 to present)     Problem Noted Resolved    Hx of preeclampsia, prior pregnancy, currently pregnant 2020 by Diego Tate MD No    Priority:  High      Twin gestation in first trimester 2020 by Diego Tate MD No          A/P: Vidhi Moody is a 25 y.o.  at 11w4d.  - RTC in 4 weeks  - Reviewed COVID-19 visitation policy  - Reviewed COVID-19 precautions     Diagnosis Plan   1. Pregnancy, unspecified gestational age  Chlamydia trachomatis, Neisseria gonorrhoeae, Trichomonas vaginalis, PCR - Urine, Urine, Clean Catch   2. Dichorionic diamniotic twin pregnancy in first trimester   confirmed by perinatology scan today first trimester   3. Hx of preeclampsia, prior pregnancy, currently pregnant     4. 11 weeks gestation of pregnancy       Diego Tate MD  2020  08:50

## 2020-06-22 DIAGNOSIS — O30.041 DICHORIONIC DIAMNIOTIC TWIN PREGNANCY IN FIRST TRIMESTER: Primary | ICD-10-CM

## 2020-06-23 DIAGNOSIS — O30.041 DICHORIONIC DIAMNIOTIC TWIN PREGNANCY IN FIRST TRIMESTER: ICD-10-CM

## 2020-07-02 ENCOUNTER — TELEPHONE (OUTPATIENT)
Dept: OBSTETRICS AND GYNECOLOGY | Facility: CLINIC | Age: 25
End: 2020-07-02

## 2020-07-02 RX ORDER — ONDANSETRON 4 MG/1
4 TABLET, FILM COATED ORAL EVERY 6 HOURS
Qty: 30 TABLET | Refills: 1 | Status: SHIPPED | OUTPATIENT
Start: 2020-07-02 | End: 2020-07-17

## 2020-07-08 ENCOUNTER — ROUTINE PRENATAL (OUTPATIENT)
Dept: OBSTETRICS AND GYNECOLOGY | Facility: CLINIC | Age: 25
End: 2020-07-08

## 2020-07-08 VITALS — SYSTOLIC BLOOD PRESSURE: 118 MMHG | DIASTOLIC BLOOD PRESSURE: 74 MMHG | WEIGHT: 183.6 LBS | BODY MASS INDEX: 28.76 KG/M2

## 2020-07-08 DIAGNOSIS — Z3A.14 14 WEEKS GESTATION OF PREGNANCY: Primary | ICD-10-CM

## 2020-07-08 DIAGNOSIS — O09.299 HX OF PREECLAMPSIA, PRIOR PREGNANCY, CURRENTLY PREGNANT: ICD-10-CM

## 2020-07-08 DIAGNOSIS — O30.041 DICHORIONIC DIAMNIOTIC TWIN PREGNANCY IN FIRST TRIMESTER: ICD-10-CM

## 2020-07-08 PROCEDURE — 0502F SUBSEQUENT PRENATAL CARE: CPT | Performed by: OBSTETRICS & GYNECOLOGY

## 2020-07-08 RX ORDER — ACETAMINOPHEN 500 MG
500 TABLET ORAL EVERY 6 HOURS PRN
COMMUNITY
End: 2020-11-30 | Stop reason: HOSPADM

## 2020-07-08 NOTE — PROGRESS NOTES
CC: Prenatal visit    Vidhi Moody is a 25 y.o.  at 14w4d.  Doing well.  Denies contractions, LOF, or VB.  Reports good FM.    /74   Wt 83.3 kg (183 lb 9.6 oz)   LMP 2020 (Exact Date)   BMI 28.76 kg/m²   SVE: Not done  Fundal Height (cm): 16 cm  Fetal Heart Rate: 150/149     Problems (from 20 to present)     Problem Noted Resolved    Hx of preeclampsia, prior pregnancy, currently pregnant 2020 by Diego Tate MD No    Priority:  High      Twin gestation in first trimester 2020 by Diego Tate MD No        Vidhi Moody is a 25 y.o. y/o female.     Chief Complaint: Here for prenatal visit    HPI:   25 y.o. .  Patient's last menstrual period was 2020 (exact date)..  Patient has had some nausea and vomiting.  Send in prescription for Zofran has not gotten filled yet          Review of Systems   ROS:  CNS: No history of brain malignancy.  HEENT: No history of throat cancer.  Eye: No history of retinal cancer.  Pulmonary: No history of lung cancer.                                                                                 Cardiovascular: No history of cardiac tumors.  Gastrointestinal: No history of small bowel tumors.  Renal: No history of kidney malignancy.  Musculoskeletal: No history of smooth muscle tumors.  Lymphatics: No history of Hodgkin's disease.  Endocrine: No history of thyroid malignancy.    The following portions of the patient's history were reviewed and updated as appropriate: allergies, current medications, past family history, past medical history, past social history, past surgical history and problem list.    Allergies   Allergen Reactions   • Hydrocodone Nausea And Vomiting   • Adhesive Tape Hives   • Dilaudid [Hydromorphone] Irritability        Outpatient Medications Prior to Visit   Medication Sig Dispense Refill   • acetaminophen (TYLENOL) 500 MG tablet Take 500 mg by mouth Every 6 (Six) Hours As Needed for  Mild Pain .     • ondansetron (Zofran) 4 MG tablet Take 1 tablet by mouth Every 6 (Six) Hours for 15 days. 30 tablet 1   • Prenatal Vit-Fe Fumarate-FA (PRENATAL, CLASSIC, VITAMIN) 28-0.8 MG tablet tablet Take  by mouth Daily.     • vitamin B-6 (PYRIDOXINE) 25 MG tablet Take 1 tablet by mouth Daily. 60 tablet 0     No facility-administered medications prior to visit.         The patient has a family history of   Family History   Problem Relation Age of Onset   • Hypertension Mother    • Hyperlipidemia Mother    • Thyroid nodules Mother    • No Known Problems Father    • No Known Problems Maternal Grandmother    • Hypertension Maternal Grandfather    • Hyperlipidemia Maternal Grandfather    • Heart disease Maternal Grandfather    • Breast cancer Paternal Grandmother 50   • No Known Problems Paternal Grandfather    • Colon cancer Paternal Uncle 50   • Diabetes Paternal Aunt         unsure which type or age of onset   • No Known Problems Daughter         Past Medical History:   Diagnosis Date   • Abnormal Pap smear of cervix    • Enlarged tonsils    • Miscarriage    • Ovarian cyst    • Preeclampsia    • Sore throat, chronic    • Varicella         OB History        4    Para   1    Term   1       0    AB   2    Living   1       SAB   0    TAB   0    Ectopic   0    Molar   0    Multiple   0    Live Births   1                 Social History     Socioeconomic History   • Marital status:      Spouse name: Not on file   • Number of children: Not on file   • Years of education: Not on file   • Highest education level: Not on file   Tobacco Use   • Smoking status: Former Smoker   • Smokeless tobacco: Never Used   Substance and Sexual Activity   • Alcohol use: Not Currently     Comment: occassional   • Drug use: No   • Sexual activity: Yes     Partners: Male     Birth control/protection: Condom     Comment: last pap smear 2019 negative         Past Surgical History:   Procedure Laterality Date   •  DILATATION AND CURETTAGE     • SHOULDER SURGERY Right    • TONSILLECTOMY     • TONSILLECTOMY AND ADENOIDECTOMY N/A 5/11/2018    Procedure: TONSILLECTOMY AND NASOPHARYNGOSCOPY   (PLEASE CALL PAT,);  Surgeon: Severiano Marshall MD;  Location: [facility];  Service: ENT   • WISDOM TOOTH EXTRACTION          Patient Active Problem List   Diagnosis   • PCOS (polycystic ovarian syndrome)   • Menorrhagia with regular cycle   • BMI 30.0-30.9,adult   • Atypical squamous cells of undetermined significance (ASCUS) on Papanicolaou smear of cervix   • Oral contraceptive pill surveillance   • Twin gestation in first trimester   • Pregnancy confirmed by positive urine test   • 8 weeks gestation of pregnancy   • Hx of preeclampsia, prior pregnancy, currently pregnant   • 11 weeks gestation of pregnancy   • Pregnancy        Documented Vitals    07/08/20 0838   BP: 118/74   Weight: 83.3 kg (183 lb 9.6 oz)        Body mass index is 28.76 kg/m².    Physical Exam  Constitutional: Appears to be in no acute distress; Eyes: sclera normal; Endocrine system: thyroid palpate is normal; Pulmonary system: lungs clear; Cardiovascular system: heart regular rate and rhythm; Gastrointestinal system: abdomen soft nontender, active bowel sounds; Urologic system: CVA negative; Psychiatric: appropriate insight; Neurologic: gait within normal limits    Laboratory Data:   Lab Results - Last 18 Months   Lab Units 06/17/20  0747 02/19/19  1917 02/18/19  0840 02/15/19  0747 02/11/19  0848   GLUCOSE mg/dL 119* 103* 109* 78 120*   BUN mg/dL 6 4* 4* 5* 4*   CREATININE mg/dL 0.66 0.44* 0.55 0.51 0.44*   SODIUM mmol/L 134* 130* 135* 135* 136*   POTASSIUM mmol/L 4.1 3.5 4.3 3.9 3.3*   CHLORIDE mmol/L 102 100 103 103 104   CO2 mmol/L 21.1* 22.0 22.0 24.0 21.0*   CALCIUM mg/dL 9.8 9.6 9.7 9.4 9.5   TOTAL PROTEIN g/dL 7.2 6.2* 7.0 6.6 6.5   ALBUMIN g/dL 4.20 3.60 3.70 3.50 3.70   ALT (SGPT) U/L 16 10 <6* 8* <6*   AST (SGOT) U/L 16 21 18 19 20   ALK PHOS U/L 62 126 122  113 119   BILIRUBIN mg/dL 0.4 0.4 0.3 0.3 0.3   EGFR IF NONAFRICN AM mL/min/1.73 107 174* 135 147 174*   GLOBULIN gm/dL 3.0 2.6 3.3 3.1 2.8   A/G RATIO g/dL 1.4 1.4 1.1 1.1 1.3   BUN / CREAT RATIO  9.1 9.1 7.3 9.8 9.1   ANION GAP mmol/L 10.9 8.0 10.0 8.0 11.0     Lab Results - Last 18 Months   Lab Units 20  0749 19  0547 19  1917 19  0840 02/15/19  0747   WBC 10*3/mm3 9.54 12.90* 14.77* 12.31* 13.61*   RBC 10*6/mm3 4.76 3.48* 3.68* 4.12 3.86   HEMOGLOBIN g/dL 13.5 10.1* 10.7* 11.9* 11.4*   HEMATOCRIT % 38.2 29.2* 30.3* 34.8 32.2*   MCV fL 80.3 83.9 82.3 84.5 83.4   MCH pg 28.4 29.0 29.1 28.9 29.5   MCHC g/dL 35.3 34.6 35.3 34.2 35.4   RDW % 12.8 13.5 13.2 13.4 13.5   RDW-SD fl 36.7* 41.3 38.9 41.0 40.3   MPV fL 10.1 10.7 10.8 10.9 10.6   PLATELETS 10*3/mm3 230 137* 142 159 145     No results for input(s): HCGQUAL in the last 13500 hours.    Assessment        Diagnosis Plan   1. 14 weeks gestation of pregnancy     2. Dichorionic diamniotic twin pregnancy in first trimester     3. Hx of preeclampsia, prior pregnancy, currently pregnant           Plan       No orders of the defined types were placed in this encounter.            This document has been electronically signed by Diego Tate MD on 2020 11:37    Please note that portions of this note were completed with a voice recognition program.           A/P: Vidhi Moody is a 25 y.o.  at 14w4d.  - RTC in 3 weeks  - Reviewed COVID-19 visitation policy  - Reviewed COVID-19 precautions     Diagnosis Plan   1. 14 weeks gestation of pregnancy     2. Dichorionic diamniotic twin pregnancy in first trimester     3. Hx of preeclampsia, prior pregnancy, currently pregnant       Diego Tate MD  2020  11:36

## 2020-07-10 ENCOUNTER — TELEPHONE (OUTPATIENT)
Dept: OBSTETRICS AND GYNECOLOGY | Facility: CLINIC | Age: 25
End: 2020-07-10

## 2020-07-10 NOTE — TELEPHONE ENCOUNTER
PATIENT CALLED WANTING TO KNOW IF SHE SHOULD START TAKING AN ASPIRIN. I SPOKE WITH DR. LEUNG AND HE SAID SHE NEEDED TO START IT AT 16 WEEKS. I TOLD THE PATIENT THAT SHE WOULD BE 15 WEEKS TOMORROW SO A WEEK FROM TOMORROW SHE CAN START IT. PATIENT VERBALIZED UNDERSTANDING.

## 2020-07-13 ENCOUNTER — TELEPHONE (OUTPATIENT)
Dept: OBSTETRICS AND GYNECOLOGY | Facility: CLINIC | Age: 25
End: 2020-07-13

## 2020-07-13 ENCOUNTER — ROUTINE PRENATAL (OUTPATIENT)
Dept: OBSTETRICS AND GYNECOLOGY | Facility: CLINIC | Age: 25
End: 2020-07-13

## 2020-07-13 VITALS — SYSTOLIC BLOOD PRESSURE: 130 MMHG | BODY MASS INDEX: 28.32 KG/M2 | WEIGHT: 180.8 LBS | DIASTOLIC BLOOD PRESSURE: 82 MMHG

## 2020-07-13 DIAGNOSIS — R10.9 CRAMPING AFFECTING PREGNANCY, ANTEPARTUM: Primary | ICD-10-CM

## 2020-07-13 DIAGNOSIS — O21.9 NAUSEA AND VOMITING DURING PREGNANCY: ICD-10-CM

## 2020-07-13 DIAGNOSIS — O30.041 DICHORIONIC DIAMNIOTIC TWIN PREGNANCY IN FIRST TRIMESTER: ICD-10-CM

## 2020-07-13 DIAGNOSIS — O26.899 CRAMPING AFFECTING PREGNANCY, ANTEPARTUM: Primary | ICD-10-CM

## 2020-07-13 DIAGNOSIS — Z3A.15 15 WEEKS GESTATION OF PREGNANCY: ICD-10-CM

## 2020-07-13 DIAGNOSIS — O09.299 HX OF PREECLAMPSIA, PRIOR PREGNANCY, CURRENTLY PREGNANT: ICD-10-CM

## 2020-07-13 PROCEDURE — 0502F SUBSEQUENT PRENATAL CARE: CPT | Performed by: OBSTETRICS & GYNECOLOGY

## 2020-07-13 RX ORDER — ONDANSETRON 4 MG/1
4 TABLET, ORALLY DISINTEGRATING ORAL EVERY 8 HOURS PRN
Qty: 30 TABLET | Refills: 2 | Status: SHIPPED | OUTPATIENT
Start: 2020-07-13 | End: 2021-01-21

## 2020-07-13 NOTE — TELEPHONE ENCOUNTER
Patient called and said she was cramping and couldn't hold anything down, including zofran. I told her to come in to the office so Dr. Tate could see her. I put her on the schedule and she verbalized understanding.

## 2020-07-13 NOTE — TELEPHONE ENCOUNTER
Patient said she woke at 230 this morning with serve cramping,,,she isnt bleeding..but the pain was so bad she was throwing up

## 2020-07-14 PROBLEM — R10.9 CRAMPING AFFECTING PREGNANCY, ANTEPARTUM: Status: ACTIVE | Noted: 2020-07-14

## 2020-07-14 PROBLEM — O26.899 CRAMPING AFFECTING PREGNANCY, ANTEPARTUM: Status: ACTIVE | Noted: 2020-07-14

## 2020-07-14 PROBLEM — O21.9 NAUSEA AND VOMITING DURING PREGNANCY: Status: ACTIVE | Noted: 2020-07-14

## 2020-07-14 NOTE — PROGRESS NOTES
CC: Prenatal visit    Vidhi Moody is a 25 y.o.  at 15w3d.  Doing well.  Denies contractions, LOF, or VB.  Reports good FM.    /82   Wt 82 kg (180 lb 12.8 oz)   LMP 2020 (Exact Date)   BMI 28.32 kg/m²   SVE: Not done  Fundal Height (cm): 18 cm  Fetal Heart Rate: 149/131     Problems (from 20 to present)     Problem Noted Resolved    Hx of preeclampsia, prior pregnancy, currently pregnant 2020 by Diego Tate MD No    Priority:  High      Twin gestation in first trimester 2020 by Diego Tate MD No          A/P: Vidhi Moody is a 25 y.o.  at 15w3d.  - RTC in 1 weeks  - Reviewed COVID-19 visitation policy  - Reviewed COVID-19 precautions     Diagnosis Plan   1. Cramping affecting pregnancy, antepartum  US Ob Limited 1 + Fetuses   2. Dichorionic diamniotic twin pregnancy in first trimester     3. Hx of preeclampsia, prior pregnancy, currently pregnant     4. 15 weeks gestation of pregnancy   also having some cramping today we got ultrasound that was reassuring   5. Nausea and vomiting during pregnancy   patient with exacerbation of nausea and vomiting Zofran usually helps but not able to hold down.  Discussed Zofran ODT wants to try this.     Diego Tate MD  2020  16:19

## 2020-07-22 ENCOUNTER — TELEPHONE (OUTPATIENT)
Dept: OBSTETRICS AND GYNECOLOGY | Facility: CLINIC | Age: 25
End: 2020-07-22

## 2020-07-22 DIAGNOSIS — R11.2 NAUSEA AND VOMITING, INTRACTABILITY OF VOMITING NOT SPECIFIED, UNSPECIFIED VOMITING TYPE: Primary | ICD-10-CM

## 2020-07-22 RX ORDER — PROMETHAZINE HYDROCHLORIDE 12.5 MG/1
12.5 TABLET ORAL EVERY 6 HOURS PRN
Qty: 30 TABLET | Refills: 2 | Status: SHIPPED | OUTPATIENT
Start: 2020-07-22 | End: 2020-08-19 | Stop reason: SDUPTHER

## 2020-07-22 NOTE — TELEPHONE ENCOUNTER
----- Message from Rolf Bone sent at 7/22/2020  9:01 AM CDT -----  PT SAYS THAT THE ZOFRAN IS NOT WORKING, SHE IS STILL PUKING. PT WANTS TO KNOW IF THERE IS ANYTHING ELSE SHE CAN TAKE.

## 2020-07-22 NOTE — TELEPHONE ENCOUNTER
I called the patient to let her know that Dr. Tate was sending in Phenergan and that she can take it with the Zofran because they work differently. Patient verbalized understanding.

## 2020-07-29 ENCOUNTER — ROUTINE PRENATAL (OUTPATIENT)
Dept: OBSTETRICS AND GYNECOLOGY | Facility: CLINIC | Age: 25
End: 2020-07-29

## 2020-07-29 VITALS — WEIGHT: 186.2 LBS | SYSTOLIC BLOOD PRESSURE: 126 MMHG | BODY MASS INDEX: 29.16 KG/M2 | DIASTOLIC BLOOD PRESSURE: 70 MMHG

## 2020-07-29 DIAGNOSIS — Z36.3 ANTENATAL SCREENING FOR MALFORMATION USING ULTRASONICS: Primary | ICD-10-CM

## 2020-07-29 DIAGNOSIS — O09.299 HX OF PREECLAMPSIA, PRIOR PREGNANCY, CURRENTLY PREGNANT: ICD-10-CM

## 2020-07-29 DIAGNOSIS — O30.041 DICHORIONIC DIAMNIOTIC TWIN PREGNANCY IN FIRST TRIMESTER: ICD-10-CM

## 2020-07-29 DIAGNOSIS — Z3A.17 17 WEEKS GESTATION OF PREGNANCY: ICD-10-CM

## 2020-07-29 PROCEDURE — 0502F SUBSEQUENT PRENATAL CARE: CPT | Performed by: OBSTETRICS & GYNECOLOGY

## 2020-07-29 NOTE — PROGRESS NOTES
CC: Prenatal visit    Vidhi Moody is a 25 y.o.  at 17w4d.  Doing well.  Denies contractions, LOF, or VB.  Reports good FM.    /70   Wt 84.5 kg (186 lb 3.2 oz)   LMP 2020 (Exact Date)   BMI 29.16 kg/m²   SVE: Not done           Problems (from 20 to present)     Problem Noted Resolved    Hx of preeclampsia, prior pregnancy, currently pregnant 2020 by Diego Tate MD No    Priority:  High      Twin gestation in first trimester 2020 by Diego Tate MD No          A/P: Vidhi Moody is a 25 y.o.  at 17w4d.  - RTC in 3 weeks  - Reviewed COVID-19 visitation policy  - Reviewed COVID-19 precautions     Diagnosis Plan   1.  screening for malformation using ultrasonics  US Ob 14 + Weeks Single or First Gestation   2. Dichorionic diamniotic twin pregnancy in first trimester  Glucose, Post 50 Gm Glucola    CBC Auto Differential    Ferritin    Iron Profile    US Ob 14 + Weeks Single or First Gestation   3. Hx of preeclampsia, prior pregnancy, currently pregnant     4. 17 weeks gestation of pregnancy       Diego Tate MD  2020  18:59

## 2020-07-31 ENCOUNTER — TELEPHONE (OUTPATIENT)
Dept: OBSTETRICS AND GYNECOLOGY | Facility: CLINIC | Age: 25
End: 2020-07-31

## 2020-07-31 RX ORDER — ASPIRIN 81 MG/1
81 TABLET, CHEWABLE ORAL DAILY
Qty: 30 TABLET | Refills: 12
Start: 2020-07-31 | End: 2021-01-21

## 2020-08-19 ENCOUNTER — LAB (OUTPATIENT)
Dept: LAB | Facility: HOSPITAL | Age: 25
End: 2020-08-19

## 2020-08-19 ENCOUNTER — ROUTINE PRENATAL (OUTPATIENT)
Dept: OBSTETRICS AND GYNECOLOGY | Facility: CLINIC | Age: 25
End: 2020-08-19

## 2020-08-19 ENCOUNTER — TELEPHONE (OUTPATIENT)
Dept: OBSTETRICS AND GYNECOLOGY | Facility: CLINIC | Age: 25
End: 2020-08-19

## 2020-08-19 VITALS — SYSTOLIC BLOOD PRESSURE: 128 MMHG | DIASTOLIC BLOOD PRESSURE: 82 MMHG | WEIGHT: 190.6 LBS | BODY MASS INDEX: 29.85 KG/M2

## 2020-08-19 DIAGNOSIS — Z3A.20 20 WEEKS GESTATION OF PREGNANCY: ICD-10-CM

## 2020-08-19 DIAGNOSIS — R11.2 NAUSEA AND VOMITING, INTRACTABILITY OF VOMITING NOT SPECIFIED, UNSPECIFIED VOMITING TYPE: ICD-10-CM

## 2020-08-19 DIAGNOSIS — O09.299 HX OF PREECLAMPSIA, PRIOR PREGNANCY, CURRENTLY PREGNANT: ICD-10-CM

## 2020-08-19 DIAGNOSIS — O30.041 DICHORIONIC DIAMNIOTIC TWIN PREGNANCY IN FIRST TRIMESTER: ICD-10-CM

## 2020-08-19 DIAGNOSIS — Z36.2 ENCOUNTER FOR OTHER ANTENATAL SCREENING FOLLOW-UP: Primary | ICD-10-CM

## 2020-08-19 DIAGNOSIS — O35.09X2: ICD-10-CM

## 2020-08-19 LAB
BASOPHILS # BLD AUTO: 0.02 10*3/MM3 (ref 0–0.2)
BASOPHILS NFR BLD AUTO: 0.2 % (ref 0–1.5)
DEPRECATED RDW RBC AUTO: 41.4 FL (ref 37–54)
EOSINOPHIL # BLD AUTO: 0.05 10*3/MM3 (ref 0–0.4)
EOSINOPHIL NFR BLD AUTO: 0.5 % (ref 0.3–6.2)
ERYTHROCYTE [DISTWIDTH] IN BLOOD BY AUTOMATED COUNT: 13.6 % (ref 12.3–15.4)
FERRITIN SERPL-MCNC: 25 NG/ML (ref 13–150)
GLUCOSE 1H P 100 G GLC PO SERPL-MCNC: 106 MG/DL (ref 60–140)
HCT VFR BLD AUTO: 33.3 % (ref 34–46.6)
HGB BLD-MCNC: 11.8 G/DL (ref 12–15.9)
IMM GRANULOCYTES # BLD AUTO: 0.05 10*3/MM3 (ref 0–0.05)
IMM GRANULOCYTES NFR BLD AUTO: 0.5 % (ref 0–0.5)
IRON 24H UR-MRATE: 56 MCG/DL (ref 37–145)
IRON SATN MFR SERPL: 11 % (ref 20–50)
LYMPHOCYTES # BLD AUTO: 2.45 10*3/MM3 (ref 0.7–3.1)
LYMPHOCYTES NFR BLD AUTO: 23.7 % (ref 19.6–45.3)
MCH RBC QN AUTO: 29.5 PG (ref 26.6–33)
MCHC RBC AUTO-ENTMCNC: 35.4 G/DL (ref 31.5–35.7)
MCV RBC AUTO: 83.3 FL (ref 79–97)
MONOCYTES # BLD AUTO: 0.5 10*3/MM3 (ref 0.1–0.9)
MONOCYTES NFR BLD AUTO: 4.8 % (ref 5–12)
NEUTROPHILS NFR BLD AUTO: 7.28 10*3/MM3 (ref 1.7–7)
NEUTROPHILS NFR BLD AUTO: 70.3 % (ref 42.7–76)
NRBC BLD AUTO-RTO: 0 /100 WBC (ref 0–0.2)
PLATELET # BLD AUTO: 182 10*3/MM3 (ref 140–450)
PMV BLD AUTO: 10.9 FL (ref 6–12)
RBC # BLD AUTO: 4 10*6/MM3 (ref 3.77–5.28)
TIBC SERPL-MCNC: 519 MCG/DL (ref 298–536)
TRANSFERRIN SERPL-MCNC: 348 MG/DL (ref 200–360)
WBC # BLD AUTO: 10.35 10*3/MM3 (ref 3.4–10.8)

## 2020-08-19 PROCEDURE — 84466 ASSAY OF TRANSFERRIN: CPT

## 2020-08-19 PROCEDURE — 36415 COLL VENOUS BLD VENIPUNCTURE: CPT | Performed by: OBSTETRICS & GYNECOLOGY

## 2020-08-19 PROCEDURE — 83540 ASSAY OF IRON: CPT

## 2020-08-19 PROCEDURE — 0502F SUBSEQUENT PRENATAL CARE: CPT | Performed by: OBSTETRICS & GYNECOLOGY

## 2020-08-19 PROCEDURE — 85025 COMPLETE CBC W/AUTO DIFF WBC: CPT | Performed by: OBSTETRICS & GYNECOLOGY

## 2020-08-19 PROCEDURE — 82950 GLUCOSE TEST: CPT

## 2020-08-19 PROCEDURE — 82728 ASSAY OF FERRITIN: CPT | Performed by: OBSTETRICS & GYNECOLOGY

## 2020-08-19 RX ORDER — PROMETHAZINE HYDROCHLORIDE 12.5 MG/1
12.5 TABLET ORAL EVERY 6 HOURS PRN
Qty: 30 TABLET | Refills: 2 | Status: SHIPPED | OUTPATIENT
Start: 2020-08-19 | End: 2020-11-30 | Stop reason: HOSPADM

## 2020-08-20 ENCOUNTER — TELEPHONE (OUTPATIENT)
Dept: OBSTETRICS AND GYNECOLOGY | Facility: CLINIC | Age: 25
End: 2020-08-20

## 2020-08-20 DIAGNOSIS — O35.09X2: Primary | ICD-10-CM

## 2020-08-20 PROBLEM — Z36.2 ENCOUNTER FOR OTHER ANTENATAL SCREENING FOLLOW-UP: Status: ACTIVE | Noted: 2020-08-20

## 2020-08-20 PROBLEM — R11.2 NAUSEA AND VOMITING: Status: ACTIVE | Noted: 2020-08-20

## 2020-08-20 PROBLEM — O35.09X0 PREGNANCY COMPLICATED BY FETAL CEREBRAL VENTRICULOMEGALY: Status: ACTIVE | Noted: 2020-08-20

## 2020-08-20 NOTE — TELEPHONE ENCOUNTER
I spoke with Dr. Jaimes at [facility].  Dr. Lombardi was not available.  Recommendations were for serology for toxoplasmosis, CMV.  Consideration for genetic evaluation cell free DNA versus amniocentesis she recommended starting out with cell free DNA organ to have this drawn.  Also she has made arrangements for Ms. Moody to see Dr. Lombardi in consultation on Tuesday.  I reviewed the above at length with Ms. Moody and answered her questions.  I reviewed the generally favorable outlook for the condition again

## 2020-08-20 NOTE — PROGRESS NOTES
CC: Prenatal visit    Vidhi Moody is a 25 y.o.  at 20w5d.  Doing well.  Denies contractions, LOF, or VB.  Reports good FM.    /82   Wt 86.5 kg (190 lb 9.6 oz)   LMP 2020 (Exact Date)   BMI 29.85 kg/m²   SVE: Not done  Fundal Height (cm): 25 cm  Fetal Heart Rate: 139/153     Problems (from 20 to present)     Problem Noted Resolved    Hx of preeclampsia, prior pregnancy, currently pregnant 2020 by Diego Tate MD No    Priority:  High      Twin gestation in first trimester 2020 by Diego Tate MD No          A/P: Vidhi Moody is a 25 y.o.  at 20w5d.  - RTC in 2 weeks  - Reviewed COVID-19 visitation policy  - Reviewed COVID-19 precautions     Diagnosis Plan   1. Encounter for other  screening follow-up  US Ob Follow Up Transabdominal Approach   2. Dichorionic diamniotic twin pregnancy in first trimester     3. Hx of preeclampsia, prior pregnancy, currently pregnant     4. Nausea and vomiting, intractability of vomiting not specified, unspecified vomiting type  promethazine (PHENERGAN) 12.5 MG tablet   5. 20 weeks gestation of pregnancy     6. Pregnancy complicated by fetal cerebral ventriculomegaly, fetus 2 of multiple gestation   I had a long discussion with the patient and her  about this.  I am going to wait till the final reading and discuss the situation with the perinatologist at the  group in [place] and go from there.     Diego Tate MD  2020  14:52

## 2020-08-21 ENCOUNTER — LAB (OUTPATIENT)
Dept: LAB | Facility: HOSPITAL | Age: 25
End: 2020-08-21

## 2020-08-21 DIAGNOSIS — O35.09X2: ICD-10-CM

## 2020-08-21 PROCEDURE — 36415 COLL VENOUS BLD VENIPUNCTURE: CPT

## 2020-08-21 PROCEDURE — 86645 CMV ANTIBODY IGM: CPT

## 2020-08-21 PROCEDURE — 86778 TOXOPLASMA ANTIBODY IGM: CPT

## 2020-08-21 PROCEDURE — 86777 TOXOPLASMA ANTIBODY: CPT

## 2020-08-21 PROCEDURE — 86644 CMV ANTIBODY: CPT

## 2020-08-22 LAB
CMV IGG SERPL IA-ACNC: >10 U/ML (ref 0–0.59)
CMV IGM SERPL IA-ACNC: 47.7 AU/ML (ref 0–29.9)
LABORATORY COMMENT REPORT: NORMAL
T GONDII IGG SERPL IA-ACNC: <3 IU/ML (ref 0–7.1)
T GONDII IGM SER IA-ACNC: <3 AU/ML (ref 0–7.9)

## 2020-08-23 ENCOUNTER — TELEPHONE (OUTPATIENT)
Dept: OBSTETRICS AND GYNECOLOGY | Facility: CLINIC | Age: 25
End: 2020-08-23

## 2020-08-23 NOTE — TELEPHONE ENCOUNTER
Reviewed the patient's toxoplasmosis titer with her it was negative.    Reviewed the CMV titer both IgM and IgG were positive.  Reviewed limitations of the test and difficulties with test.  She has an appointment on Tuesday with perinatology group and I am going to defer to the a.m. recommendations on work-up as on review of up-to-date it can be rather complicated.have discussed with patient and made this clear

## 2020-08-24 ENCOUNTER — LAB (OUTPATIENT)
Dept: LAB | Facility: HOSPITAL | Age: 25
End: 2020-08-24

## 2020-08-24 ENCOUNTER — TELEPHONE (OUTPATIENT)
Dept: OBSTETRICS AND GYNECOLOGY | Facility: CLINIC | Age: 25
End: 2020-08-24

## 2020-08-24 DIAGNOSIS — O35.09X2: ICD-10-CM

## 2020-08-24 NOTE — TELEPHONE ENCOUNTER
I called the patient to see if she could come to the office to get blood work done. Patient said after she got back from lunch she would be over here.

## 2020-08-25 DIAGNOSIS — Z36.2 ENCOUNTER FOR OTHER ANTENATAL SCREENING FOLLOW-UP: ICD-10-CM

## 2020-08-25 DIAGNOSIS — O35.09X2: Primary | ICD-10-CM

## 2020-08-25 LAB — SPECIMEN STATUS: NORMAL

## 2020-08-31 ENCOUNTER — PREP FOR SURGERY (OUTPATIENT)
Dept: OTHER | Facility: HOSPITAL | Age: 25
End: 2020-08-31

## 2020-08-31 ENCOUNTER — TELEPHONE (OUTPATIENT)
Dept: OBSTETRICS AND GYNECOLOGY | Facility: CLINIC | Age: 25
End: 2020-08-31

## 2020-08-31 NOTE — TELEPHONE ENCOUNTER
I spoke with patient and reviewed report from [facility]  group and I reviewed cell free DNA and its limitations in twin gestation

## 2020-09-08 ENCOUNTER — HOSPITAL ENCOUNTER (OUTPATIENT)
Facility: HOSPITAL | Age: 25
Discharge: HOME | End: 2020-09-08
Attending: OBSTETRICS & GYNECOLOGY | Admitting: OBSTETRICS & GYNECOLOGY

## 2020-09-08 ENCOUNTER — HOSPITAL ENCOUNTER (OUTPATIENT)
Facility: HOSPITAL | Age: 25
End: 2020-09-08
Attending: OBSTETRICS & GYNECOLOGY | Admitting: OBSTETRICS & GYNECOLOGY

## 2020-09-08 VITALS
HEART RATE: 90 BPM | DIASTOLIC BLOOD PRESSURE: 71 MMHG | TEMPERATURE: 98.2 F | RESPIRATION RATE: 20 BRPM | SYSTOLIC BLOOD PRESSURE: 120 MMHG

## 2020-09-08 LAB
BACTERIA UR QL AUTO: ABNORMAL /HPF
BILIRUB UR QL STRIP: NEGATIVE
CANDIDA ALBICANS: NEGATIVE
CLARITY UR: ABNORMAL
COLOR UR: YELLOW
GARDNERELLA VAGINALIS: NEGATIVE
GLUCOSE UR STRIP-MCNC: NEGATIVE MG/DL
HGB UR QL STRIP.AUTO: NEGATIVE
HYALINE CASTS UR QL AUTO: ABNORMAL /LPF
KETONES UR QL STRIP: NEGATIVE
LEUKOCYTE ESTERASE UR QL STRIP.AUTO: ABNORMAL
NITRITE UR QL STRIP: NEGATIVE
PH UR STRIP.AUTO: 6 [PH] (ref 5–9)
PROT UR QL STRIP: NEGATIVE
RBC # UR: ABNORMAL /HPF
REF LAB TEST METHOD: ABNORMAL
SP GR UR STRIP: 1.01 (ref 1–1.03)
SQUAMOUS #/AREA URNS HPF: ABNORMAL /HPF
T VAGINALIS DNA VAG QL PROBE+SIG AMP: NEGATIVE
UROBILINOGEN UR QL STRIP: ABNORMAL
WBC UR QL AUTO: ABNORMAL /HPF

## 2020-09-08 PROCEDURE — 87510 GARDNER VAG DNA DIR PROBE: CPT | Performed by: FAMILY MEDICINE

## 2020-09-08 PROCEDURE — G0463 HOSPITAL OUTPT CLINIC VISIT: HCPCS

## 2020-09-08 PROCEDURE — 87660 TRICHOMONAS VAGIN DIR PROBE: CPT | Performed by: FAMILY MEDICINE

## 2020-09-08 PROCEDURE — 87480 CANDIDA DNA DIR PROBE: CPT | Performed by: FAMILY MEDICINE

## 2020-09-08 PROCEDURE — 81001 URINALYSIS AUTO W/SCOPE: CPT | Performed by: OBSTETRICS & GYNECOLOGY

## 2020-09-08 NOTE — DISCHARGE INSTR - ACTIVITY
Drink plenty of fluids, take over the counter Tylenol for pain , rest . Keep appointment for cervical length tomorrow as previously scheduled.

## 2020-09-09 ENCOUNTER — ROUTINE PRENATAL (OUTPATIENT)
Dept: OBSTETRICS AND GYNECOLOGY | Facility: CLINIC | Age: 25
End: 2020-09-09

## 2020-09-09 VITALS — BODY MASS INDEX: 31.2 KG/M2 | SYSTOLIC BLOOD PRESSURE: 118 MMHG | DIASTOLIC BLOOD PRESSURE: 66 MMHG | WEIGHT: 199.2 LBS

## 2020-09-09 DIAGNOSIS — O09.299 HX OF PREECLAMPSIA, PRIOR PREGNANCY, CURRENTLY PREGNANT: ICD-10-CM

## 2020-09-09 DIAGNOSIS — O30.041 DICHORIONIC DIAMNIOTIC TWIN PREGNANCY IN FIRST TRIMESTER: ICD-10-CM

## 2020-09-09 DIAGNOSIS — Z3A.23 23 WEEKS GESTATION OF PREGNANCY: Primary | ICD-10-CM

## 2020-09-09 DIAGNOSIS — O47.00 PRETERM UTERINE CONTRACTIONS, ANTEPARTUM: ICD-10-CM

## 2020-09-09 PROCEDURE — 0502F SUBSEQUENT PRENATAL CARE: CPT | Performed by: OBSTETRICS & GYNECOLOGY

## 2020-09-10 DIAGNOSIS — O35.09X2: Primary | ICD-10-CM

## 2020-09-11 NOTE — PROGRESS NOTES
CC: Prenatal visit    Vidhi Moody is a 25 y.o.  at 23w6d.  Doing well.  Denies contractions, LOF, or VB.  Reports good FM.    /66   Wt 90.4 kg (199 lb 3.2 oz)   LMP 2020 (Exact Date)   BMI 31.20 kg/m²   SVE: Not done  Fundal Height (cm): 29 cm  Fetal Heart Rate: 158/139      Problems (from 20 to present)     Problem Noted Resolved    Hx of preeclampsia, prior pregnancy, currently pregnant 2020 by Diego Tate MD No    Priority:  High      Twin gestation in first trimester 2020 by Diego Tate MD No          A/P: Vidhi Moody is a 25 y.o.  at 23w6d.  - RTC in 1 weeks  - Reviewed COVID-19 visitation policy  - Reviewed COVID-19 precautions     Diagnosis Plan   1. 23 weeks gestation of pregnancy     2. Dichorionic diamniotic twin pregnancy in first trimester   patient is leaning toward  section on maternal request if not indicated in light of ventricular megaly though this is resolved reviewed risk benefits alternatives with patient and also concerned about CMV   3. Hx of preeclampsia, prior pregnancy, currently pregnant     4.  uterine contractions, antepartum      Cervix 3.1 cm 2020     Diego Tate MD  2020  15:43

## 2020-09-16 ENCOUNTER — ROUTINE PRENATAL (OUTPATIENT)
Dept: OBSTETRICS AND GYNECOLOGY | Facility: CLINIC | Age: 25
End: 2020-09-16

## 2020-09-16 ENCOUNTER — TELEPHONE (OUTPATIENT)
Dept: OBSTETRICS AND GYNECOLOGY | Facility: CLINIC | Age: 25
End: 2020-09-16

## 2020-09-16 VITALS — WEIGHT: 201.6 LBS | BODY MASS INDEX: 31.58 KG/M2 | DIASTOLIC BLOOD PRESSURE: 80 MMHG | SYSTOLIC BLOOD PRESSURE: 128 MMHG

## 2020-09-16 DIAGNOSIS — O09.299 HX OF PREECLAMPSIA, PRIOR PREGNANCY, CURRENTLY PREGNANT: ICD-10-CM

## 2020-09-16 DIAGNOSIS — O47.00 PRETERM UTERINE CONTRACTIONS, ANTEPARTUM: Primary | ICD-10-CM

## 2020-09-16 DIAGNOSIS — O30.041 DICHORIONIC DIAMNIOTIC TWIN PREGNANCY IN FIRST TRIMESTER: ICD-10-CM

## 2020-09-16 DIAGNOSIS — Z3A.24 24 WEEKS GESTATION OF PREGNANCY: ICD-10-CM

## 2020-09-16 LAB
BLOODY SPECIMEN?: NO
FIBRONECTIN FETAL VAG QL: NEGATIVE

## 2020-09-16 PROCEDURE — 0502F SUBSEQUENT PRENATAL CARE: CPT | Performed by: OBSTETRICS & GYNECOLOGY

## 2020-09-16 PROCEDURE — 82731 ASSAY OF FETAL FIBRONECTIN: CPT | Performed by: OBSTETRICS & GYNECOLOGY

## 2020-09-16 NOTE — PROGRESS NOTES
CC: Prenatal visit    Vidhi Moody is a 25 y.o.  at 24w4d.  Doing well.  Denies contractions, LOF, or VB.  Reports good FM.    /80   Wt 91.4 kg (201 lb 9.6 oz)   LMP 2020 (Exact Date)   BMI 31.58 kg/m²   SVE: Closed           Problems (from 20 to present)     Problem Noted Resolved    Hx of preeclampsia, prior pregnancy, currently pregnant 2020 by Diego Tate MD No    Priority:  High      Twin gestation in first trimester 2020 by Diego Tate MD No          A/P: Vidhi Moody is a 25 y.o.  at 24w4d.  - RTC in 1 weeks  - Reviewed COVID-19 visitation policy  - Reviewed COVID-19 precautions     Diagnosis Plan   1.  uterine contractions, antepartum  Fetal Fibronectin - Vaginal Fluid, Cervix    Fetal Fibronectin - Vaginal Fluid, Cervix patient complains of some occasional contractions about 1 or 2 an hour we will went ahead and did a fetal fibronectin today patient says nothing in vagina for last 24 hours   2. Dichorionic diamniotic twin pregnancy in first trimester     3. Hx of preeclampsia, prior pregnancy, currently pregnant   no headaches visual changes epigastric pain initial blood pressure 138/82 repeat not borderline she is going to take her blood pressure twice a day let us know if elevations   4. 24 weeks gestation of pregnancy       Diego Tate MD  2020  17:00 CDT

## 2020-09-21 ENCOUNTER — ROUTINE PRENATAL (OUTPATIENT)
Dept: OBSTETRICS AND GYNECOLOGY | Facility: CLINIC | Age: 25
End: 2020-09-21

## 2020-09-21 VITALS — BODY MASS INDEX: 31.51 KG/M2 | WEIGHT: 201.2 LBS | SYSTOLIC BLOOD PRESSURE: 126 MMHG | DIASTOLIC BLOOD PRESSURE: 84 MMHG

## 2020-09-21 DIAGNOSIS — O09.299 HX OF PREECLAMPSIA, PRIOR PREGNANCY, CURRENTLY PREGNANT: ICD-10-CM

## 2020-09-21 DIAGNOSIS — Z36.3 ANTENATAL SCREENING FOR MALFORMATION USING ULTRASONICS: ICD-10-CM

## 2020-09-21 DIAGNOSIS — O30.041 DICHORIONIC DIAMNIOTIC TWIN PREGNANCY IN FIRST TRIMESTER: ICD-10-CM

## 2020-09-21 DIAGNOSIS — R10.2 PELVIC PAIN IN PREGNANCY: Primary | ICD-10-CM

## 2020-09-21 DIAGNOSIS — O47.00 PRETERM UTERINE CONTRACTIONS, ANTEPARTUM: ICD-10-CM

## 2020-09-21 DIAGNOSIS — Z3A.25 25 WEEKS GESTATION OF PREGNANCY: ICD-10-CM

## 2020-09-21 DIAGNOSIS — O26.899 PELVIC PAIN IN PREGNANCY: Primary | ICD-10-CM

## 2020-09-21 PROCEDURE — 0502F SUBSEQUENT PRENATAL CARE: CPT | Performed by: OBSTETRICS & GYNECOLOGY

## 2020-09-24 ENCOUNTER — ROUTINE PRENATAL (OUTPATIENT)
Dept: OBSTETRICS AND GYNECOLOGY | Facility: CLINIC | Age: 25
End: 2020-09-24

## 2020-09-24 VITALS — BODY MASS INDEX: 31.54 KG/M2 | WEIGHT: 201.4 LBS | DIASTOLIC BLOOD PRESSURE: 82 MMHG | SYSTOLIC BLOOD PRESSURE: 124 MMHG

## 2020-09-24 DIAGNOSIS — Z20.828 EXPOSURE TO CYTOMEGALOVIRUS (CMV): Primary | ICD-10-CM

## 2020-09-24 DIAGNOSIS — O35.09X0 PREGNANCY COMPLICATED BY FETAL CEREBRAL VENTRICULOMEGALY, SINGLE OR UNSPECIFIED FETUS: ICD-10-CM

## 2020-09-24 DIAGNOSIS — O30.041 DICHORIONIC DIAMNIOTIC TWIN PREGNANCY IN FIRST TRIMESTER: ICD-10-CM

## 2020-09-24 DIAGNOSIS — O09.299 HX OF PREECLAMPSIA, PRIOR PREGNANCY, CURRENTLY PREGNANT: ICD-10-CM

## 2020-09-24 PROCEDURE — 0502F SUBSEQUENT PRENATAL CARE: CPT | Performed by: OBSTETRICS & GYNECOLOGY

## 2020-09-25 NOTE — PROGRESS NOTES
CC: Prenatal visit    Vidhi Moody is a 25 y.o.  at 25w6d.  Doing well.  Denies contractions, LOF, or VB.  Reports good FM.    /84   Wt 91.3 kg (201 lb 3.2 oz)   LMP 2020 (Exact Date)   BMI 31.51 kg/m²   SVE: Not done  Fundal Height (cm): 32 cm  Fetal Heart Rate: 140/150     Problems (from 20 to present)     Problem Noted Resolved    Pregnancy complicated by fetal cerebral ventriculomegaly 2020 by Diego Tate MD No    Priority:  High      Hx of preeclampsia, prior pregnancy, currently pregnant 2020 by Diego Tate MD No    Priority:  High      Exposure to cytomegalovirus (CMV) 2020 by Diego Tate MD No    Twin gestation in first trimester 2020 by Diego Tate MD No          A/P: Vidhi Moody is a 25 y.o.  at 25w6d.  - RTC in 3 days  - Reviewed COVID-19 visitation policy  - Reviewed COVID-19 precautions     Diagnosis Plan   1. Pelvic pain in pregnancy  US Ob Transvaginal   2. Dichorionic diamniotic twin pregnancy in first trimester     3. Hx of preeclampsia, prior pregnancy, currently pregnant     4. 25 weeks gestation of pregnancy     5.  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  Having fair number of contractions got a cervical length reassuring 3.5.  But I am going to put her at bed rest is pretty symptomatic I think she may not be able to work as symptomatic as she is particularly even though cervical length reassuring I do not think is it is reassuring is in twin twins     Diego Tate MD  2020  15:03 CDT

## 2020-09-25 NOTE — PROGRESS NOTES
CC: Prenatal visit     Vidhi Moody is a 25 y.o.  at 25w5d.  Doing well.  Denies contractions, LOF, or VB.  Reports good FM.    /82   Wt 91.4 kg (201 lb 6.4 oz)   LMP 2020 (Exact Date)   BMI 31.54 kg/m²   SVE: Not done  Fundal Height (cm): 33 cm  Fetal Heart Rate: 160/150     Problems (from 20 to present)     Problem Noted Resolved    Pregnancy complicated by fetal cerebral ventriculomegaly 2020 by Diego Tate MD No    Priority:  High      Hx of preeclampsia, prior pregnancy, currently pregnant 2020 by Diego Tate MD No    Priority:  High      Exposure to cytomegalovirus (CMV) 2020 by Diego Tate MD No    Twin gestation in first trimester 2020 by Diego Tate MD No          A/P: Vidhi Moody is a 25 y.o.  at 25w5d.  - RTC in 1 weeks  - Reviewed COVID-19 visitation policy  - Reviewed COVID-19 precautions     Diagnosis Plan   1. Exposure to cytomegalovirus (CMV)   avidity te by  group sting reviewed with patient   2. Dichorionic diamniotic twin pregnancy in first trimester     3. Hx of preeclampsia, prior pregnancy, currently pregnant     4. Pregnancy complicated by fetal cerebral ventriculomegaly, single or unspecified fetus   patient has follow-up with  group in about 1 week     Diego Tate MD  2020  22:17 CDT

## 2020-09-29 DIAGNOSIS — O26.899 PELVIC PAIN IN PREGNANCY: ICD-10-CM

## 2020-09-29 DIAGNOSIS — R10.2 PELVIC PAIN IN PREGNANCY: ICD-10-CM

## 2020-10-01 ENCOUNTER — HOSPITAL ENCOUNTER (OUTPATIENT)
Facility: HOSPITAL | Age: 25
Discharge: HOME | End: 2020-10-01
Attending: OBSTETRICS & GYNECOLOGY | Admitting: OBSTETRICS & GYNECOLOGY

## 2020-10-01 ENCOUNTER — HOSPITAL ENCOUNTER (OUTPATIENT)
Facility: HOSPITAL | Age: 25
End: 2020-10-01
Attending: OBSTETRICS & GYNECOLOGY | Admitting: OBSTETRICS & GYNECOLOGY

## 2020-10-01 ENCOUNTER — ROUTINE PRENATAL (OUTPATIENT)
Dept: OBSTETRICS AND GYNECOLOGY | Facility: CLINIC | Age: 25
End: 2020-10-01

## 2020-10-01 VITALS
HEART RATE: 88 BPM | OXYGEN SATURATION: 98 % | TEMPERATURE: 98 F | SYSTOLIC BLOOD PRESSURE: 120 MMHG | RESPIRATION RATE: 18 BRPM | DIASTOLIC BLOOD PRESSURE: 79 MMHG

## 2020-10-01 VITALS — BODY MASS INDEX: 32.33 KG/M2 | SYSTOLIC BLOOD PRESSURE: 122 MMHG | DIASTOLIC BLOOD PRESSURE: 80 MMHG | WEIGHT: 206.4 LBS

## 2020-10-01 DIAGNOSIS — O16.2 ELEVATED BLOOD PRESSURE AFFECTING PREGNANCY IN SECOND TRIMESTER, ANTEPARTUM: ICD-10-CM

## 2020-10-01 DIAGNOSIS — Z20.828 EXPOSURE TO CYTOMEGALOVIRUS (CMV): ICD-10-CM

## 2020-10-01 DIAGNOSIS — O16.3 HIGH BLOOD PRESSURE AFFECTING PREGNANCY IN THIRD TRIMESTER, ANTEPARTUM: Primary | ICD-10-CM

## 2020-10-01 DIAGNOSIS — O35.09X0 PREGNANCY COMPLICATED BY FETAL CEREBRAL VENTRICULOMEGALY, SINGLE OR UNSPECIFIED FETUS: ICD-10-CM

## 2020-10-01 DIAGNOSIS — O09.299 HX OF PREECLAMPSIA, PRIOR PREGNANCY, CURRENTLY PREGNANT: ICD-10-CM

## 2020-10-01 DIAGNOSIS — Z3A.26 26 WEEKS GESTATION OF PREGNANCY: Primary | ICD-10-CM

## 2020-10-01 DIAGNOSIS — O30.041 DICHORIONIC DIAMNIOTIC TWIN PREGNANCY IN FIRST TRIMESTER: ICD-10-CM

## 2020-10-01 LAB
ALBUMIN SERPL-MCNC: 3.5 G/DL (ref 3.5–5.2)
ALBUMIN/GLOB SERPL: 1.3 G/DL
ALP SERPL-CCNC: 94 U/L (ref 39–117)
ALT SERPL W P-5'-P-CCNC: 6 U/L (ref 1–33)
ANION GAP SERPL CALCULATED.3IONS-SCNC: 11 MMOL/L (ref 5–15)
AST SERPL-CCNC: 14 U/L (ref 1–32)
BASOPHILS # BLD AUTO: 0.02 10*3/MM3 (ref 0–0.2)
BASOPHILS NFR BLD AUTO: 0.2 % (ref 0–1.5)
BILIRUB SERPL-MCNC: 0.2 MG/DL (ref 0–1.2)
BUN SERPL-MCNC: 5 MG/DL (ref 6–20)
BUN/CREAT SERPL: 9.8 (ref 7–25)
CALCIUM SPEC-SCNC: 8.9 MG/DL (ref 8.6–10.5)
CHLORIDE SERPL-SCNC: 104 MMOL/L (ref 98–107)
CO2 SERPL-SCNC: 21 MMOL/L (ref 22–29)
CREAT SERPL-MCNC: 0.51 MG/DL (ref 0.57–1)
CREAT UR-MCNC: 160.2 MG/DL
DEPRECATED RDW RBC AUTO: 42.7 FL (ref 37–54)
EOSINOPHIL # BLD AUTO: 0.06 10*3/MM3 (ref 0–0.4)
EOSINOPHIL NFR BLD AUTO: 0.5 % (ref 0.3–6.2)
ERYTHROCYTE [DISTWIDTH] IN BLOOD BY AUTOMATED COUNT: 14.1 % (ref 12.3–15.4)
GFR SERPL CREATININE-BSD FRML MDRD: 145 ML/MIN/1.73
GLOBULIN UR ELPH-MCNC: 2.6 GM/DL
GLUCOSE SERPL-MCNC: 113 MG/DL (ref 65–99)
HCT VFR BLD AUTO: 31.8 % (ref 34–46.6)
HGB BLD-MCNC: 11.3 G/DL (ref 12–15.9)
HOLD SPECIMEN: NORMAL
IMM GRANULOCYTES # BLD AUTO: 0.11 10*3/MM3 (ref 0–0.05)
IMM GRANULOCYTES NFR BLD AUTO: 1 % (ref 0–0.5)
LYMPHOCYTES # BLD AUTO: 3.13 10*3/MM3 (ref 0.7–3.1)
LYMPHOCYTES NFR BLD AUTO: 27.9 % (ref 19.6–45.3)
MCH RBC QN AUTO: 29.8 PG (ref 26.6–33)
MCHC RBC AUTO-ENTMCNC: 35.5 G/DL (ref 31.5–35.7)
MCV RBC AUTO: 83.9 FL (ref 79–97)
MONOCYTES # BLD AUTO: 0.62 10*3/MM3 (ref 0.1–0.9)
MONOCYTES NFR BLD AUTO: 5.5 % (ref 5–12)
NEUTROPHILS NFR BLD AUTO: 64.9 % (ref 42.7–76)
NEUTROPHILS NFR BLD AUTO: 7.27 10*3/MM3 (ref 1.7–7)
NRBC BLD AUTO-RTO: 0 /100 WBC (ref 0–0.2)
PLATELET # BLD AUTO: 157 10*3/MM3 (ref 140–450)
PMV BLD AUTO: 10.4 FL (ref 6–12)
POTASSIUM SERPL-SCNC: 3.2 MMOL/L (ref 3.5–5.2)
PROT SERPL-MCNC: 6.1 G/DL (ref 6–8.5)
PROT UR-MCNC: 15 MG/DL
PROT/CREAT UR: 93.6 MG/G CREA (ref 0–200)
RBC # BLD AUTO: 3.79 10*6/MM3 (ref 3.77–5.28)
SODIUM SERPL-SCNC: 136 MMOL/L (ref 136–145)
WBC # BLD AUTO: 11.21 10*3/MM3 (ref 3.4–10.8)
WHOLE BLOOD HOLD SPECIMEN: NORMAL

## 2020-10-01 PROCEDURE — 84156 ASSAY OF PROTEIN URINE: CPT | Performed by: OBSTETRICS & GYNECOLOGY

## 2020-10-01 PROCEDURE — 82570 ASSAY OF URINE CREATININE: CPT | Performed by: OBSTETRICS & GYNECOLOGY

## 2020-10-01 PROCEDURE — 59025 FETAL NON-STRESS TEST: CPT | Performed by: OBSTETRICS & GYNECOLOGY

## 2020-10-01 PROCEDURE — 85025 COMPLETE CBC W/AUTO DIFF WBC: CPT | Performed by: OBSTETRICS & GYNECOLOGY

## 2020-10-01 PROCEDURE — 80053 COMPREHEN METABOLIC PANEL: CPT | Performed by: OBSTETRICS & GYNECOLOGY

## 2020-10-01 PROCEDURE — 36415 COLL VENOUS BLD VENIPUNCTURE: CPT | Performed by: OBSTETRICS & GYNECOLOGY

## 2020-10-01 PROCEDURE — 99213 OFFICE O/P EST LOW 20 MIN: CPT | Performed by: OBSTETRICS & GYNECOLOGY

## 2020-10-01 PROCEDURE — 59025 FETAL NON-STRESS TEST: CPT

## 2020-10-01 PROCEDURE — 0502F SUBSEQUENT PRENATAL CARE: CPT | Performed by: OBSTETRICS & GYNECOLOGY

## 2020-10-01 PROCEDURE — G0463 HOSPITAL OUTPT CLINIC VISIT: HCPCS

## 2020-10-01 NOTE — DISCHARGE INSTRUCTIONS
Return to labor and delivery for contractions, if your water breaks, vaginal bleeding, decreased fetal movement.  Report or return for headache not relieved with tylenol, visual disturbances, swelling,  pain in upper abdomen (not heartburn).   Keep next scheduled appt and call [phone] for any concerns or problems.        Remember to continue to collect 24 hour urine as discussed with Dr Tate and bring to lab tomorrow.   Stop collection tomorrow @ 1:50pm and bring to lab as discussed with Dr Tate

## 2020-10-01 NOTE — PROGRESS NOTES
CC: Prenatal visit    Vidhi Moody is a 25 y.o.  at 26w5d.  Doing well.  Denies contractions, LOF, or VB.  Reports good FM.    /80   Wt 93.6 kg (206 lb 6.4 oz)   LMP 2020 (Exact Date)   BMI 32.33 kg/m²   SVE: Not done  Fundal Height (cm): 33 cm  Fetal Heart Rate: 150/130     Problems (from 20 to present)     Problem Noted Resolved    Pregnancy complicated by fetal cerebral ventriculomegaly 2020 by Diego Tate MD No    Priority:  High      Hx of preeclampsia, prior pregnancy, currently pregnant 2020 by Diego Tate MD No    Priority:  High      Exposure to cytomegalovirus (CMV) 2020 by Diego Tate MD No    Twin gestation in first trimester 2020 by Diego Tate MD No          A/P: Vidhi Moody is a 25 y.o.  at 26w5d.  - RTC sent to the labor and delivery  - Reviewed COVID-19 visitation policy  - Reviewed COVID-19 precautions     Diagnosis Plan   1. 26 weeks gestation of pregnancy     2. Exposure to cytomegalovirus (CMV)   counseled in regard to situation by Dr. Lombardi who saw on Tuesday his report pending   3. Pregnancy complicated by fetal cerebral ventriculomegaly, single or unspecified fetus     4. Hx of preeclampsia, prior pregnancy, currently pregnant     5. Dichorionic diamniotic twin pregnancy in first trimester   undergoing ongoing evaluation by  group per their protocol   6. Elevated blood pressure affecting pregnancy in second trimester, antepartum   patient elevated blood pressure 140/92.  Follow-up 122/80 work and send to labor and delivery for lab work further evaluation will go from there     Diego Tate MD  10/1/2020  16:27 CDT

## 2020-10-01 NOTE — NON STRESS TEST
Vidhi Moody, tushar  at 26w5d with an PABLO of 2021, by Ultrasound, was seen at [facility] for a nonstress test.    Chief Complaint   Patient presents with   • Elevated Blood Pressure     sent from office    • other     denies leaking. denies bleeding. reports normal fetal movement        Patient Active Problem List   Diagnosis   • PCOS (polycystic ovarian syndrome)   • Menorrhagia with regular cycle   • BMI 30.0-30.9,adult   • Atypical squamous cells of undetermined significance (ASCUS) on Papanicolaou smear of cervix   • Oral contraceptive pill surveillance   • Twin gestation in first trimester   • Pregnancy confirmed by positive urine test   • 8 weeks gestation of pregnancy   • Hx of preeclampsia, prior pregnancy, currently pregnant   • 11 weeks gestation of pregnancy   • Pregnancy   • Cramping affecting pregnancy, antepartum   • Nausea and vomiting during pregnancy   •  screening for malformation using ultrasonics   • Encounter for other  screening follow-up   • Nausea and vomiting   • Pregnancy complicated by fetal cerebral ventriculomegaly   •  uterine contractions, antepartum   • Exposure to cytomegalovirus (CMV)   • Elevated blood pressure affecting pregnancy in second trimester, antepartum       Start Time:  Stop Time:       Interpretation A  Nonstress Test Interpretation A: Reactive (10/01/20 1710 : Ba Andrea, RN)  Comments A: strip reviewed with EFFIE SALINAS,  Baby B Also reactive reviewed with EFFIE SALINAS also (10/01/20 1710 : Ba Andrea, RN)    Sent home with 24hour u/a collection  Fetal heart rate strip reviewed agree reactive.    Diagnosis is dichorionic diamniotic twin gestation in second trimester    1 elevated blood pressure

## 2020-10-01 NOTE — PROGRESS NOTES
[facility] 1995  MRN: [id number]  CSN: [id number]    Progress note    Patient is a 25 y.o. female  currently at 26w5d, who presents with patient sent from the office for 1 elevated blood pressure.  Denies any headaches visual change epigastric pain history of preeclampsia with first pregnancy.  Now pregnant with twins.    All her pressures in labor and delivery have been good she has been up out of bed pressures in various positions have been good      Min/max vitals past 24 hours:  Temp  Min: 98 °F (36.7 °C)  Max: 98 °F (36.7 °C)   BP  Min: 108/72  Max: 140/92(elevated blood pressure in office)   Pulse  Min: 83  Max: 100   Resp  Min: 18  Max: 18      Physical findings: Lungs clear heart regular rate and rhythm abdomen soft nontender neurologic DTRs 2+/4 no clonus extremities trace edema        Lab Results (last 24 hours)     Procedure Component Value Units Date/Time    Extra Tubes [[id number]] Collected: 10/01/20 1422    Specimen: Blood, Venous Line Updated: 10/01/20 1530    Narrative:      The following orders were created for panel order Extra Tubes.  Procedure                               Abnormality         Status                     ---------                               -----------         ------                     Light Blue Top[[id number]]                                   Final result               Gold Top - SST[[id number]]                                   Final result                 Please view results for these tests on the individual orders.    Light Blue Top [[id number]] Collected: 10/01/20 142    Specimen: Blood Updated: 10/01/20 1530     Extra Tube hold for add-on     Comment: Auto resulted       Gold Top - SST [[id number]] Collected: 10/01/20 1422    Specimen: Blood Updated: 10/01/20 153     Extra Tube Hold for add-ons.     Comment: Auto resulted.       Comprehensive Metabolic Panel [[id number]]  (Abnormal) Collected: 10/01/20 1416    Specimen:  Blood Updated: 10/01/20 1446     Glucose 113 mg/dL      BUN 5 mg/dL      Creatinine 0.51 mg/dL      Sodium 136 mmol/L      Potassium 3.2 mmol/L      Chloride 104 mmol/L      CO2 21.0 mmol/L      Calcium 8.9 mg/dL      Total Protein 6.1 g/dL      Albumin 3.50 g/dL      ALT (SGPT) 6 U/L      AST (SGOT) 14 U/L      Alkaline Phosphatase 94 U/L      Total Bilirubin 0.2 mg/dL      eGFR Non African Amer 145 mL/min/1.73      Globulin 2.6 gm/dL      A/G Ratio 1.3 g/dL      BUN/Creatinine Ratio 9.8     Anion Gap 11.0 mmol/L     Narrative:      GFR Normal >60  Chronic Kidney Disease <60  Kidney Failure <15      CBC & Differential [[id number]]  (Abnormal) Collected: 10/01/20 1416    Specimen: Blood Updated: 10/01/20 1425    Narrative:      The following orders were created for panel order CBC & Differential.  Procedure                               Abnormality         Status                     ---------                               -----------         ------                     CBC Auto Differential[[id number]]        Abnormal            Final result                 Please view results for these tests on the individual orders.    CBC Auto Differential [[id number]]  (Abnormal) Collected: 10/01/20 1416    Specimen: Blood Updated: 10/01/20 1425     WBC 11.21 10*3/mm3      RBC 3.79 10*6/mm3      Hemoglobin 11.3 g/dL      Hematocrit 31.8 %      MCV 83.9 fL      MCH 29.8 pg      MCHC 35.5 g/dL      RDW 14.1 %      RDW-SD 42.7 fl      MPV 10.4 fL      Platelets 157 10*3/mm3      Neutrophil % 64.9 %      Lymphocyte % 27.9 %      Monocyte % 5.5 %      Eosinophil % 0.5 %      Basophil % 0.2 %      Immature Grans % 1.0 %      Neutrophils, Absolute 7.27 10*3/mm3      Lymphocytes, Absolute 3.13 10*3/mm3      Monocytes, Absolute 0.62 10*3/mm3      Eosinophils, Absolute 0.06 10*3/mm3      Basophils, Absolute 0.02 10*3/mm3      Immature Grans, Absolute 0.11 10*3/mm3      nRBC 0.0 /100 WBC     Protein / Creatinine Ratio, Urine - Urine, Clean  Catch [[id number]] Collected: 10/01/20 1354    Specimen: Urine, Clean Catch Updated: 10/01/20 1403          Imaging Results (Last 24 Hours)     ** No results found for the last 24 hours. **          Assessment  Plan   1. 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  She is going to return for systolic over 100 diastolics over 150 headaches visual changes rupture of membranes decreased fetal movement or any other problems.  Appointment to be reevaluated on Monday.  Will finish 24-hour urine out at home and bring in tomorrow          This document has been electronically signed by Diego Tate MD on October 1, 2020 17:11 CDT

## 2020-10-02 ENCOUNTER — TELEPHONE (OUTPATIENT)
Dept: OBSTETRICS AND GYNECOLOGY | Facility: CLINIC | Age: 25
End: 2020-10-02

## 2020-10-02 ENCOUNTER — LAB (OUTPATIENT)
Dept: LAB | Facility: HOSPITAL | Age: 25
End: 2020-10-02

## 2020-10-02 PROCEDURE — 84156 ASSAY OF PROTEIN URINE: CPT | Performed by: OBSTETRICS & GYNECOLOGY

## 2020-10-02 PROCEDURE — 81050 URINALYSIS VOLUME MEASURE: CPT | Performed by: OBSTETRICS & GYNECOLOGY

## 2020-10-02 NOTE — TELEPHONE ENCOUNTER
Called let patient know about results of her urine protein creatinine ratio which was negative.  Says her blood pressures at home have been good no headaches visual changes epigastric

## 2020-10-03 LAB — PROT 24H UR-MRATE: 126 MG/24HOURS (ref 0–150)

## 2020-10-05 ENCOUNTER — ROUTINE PRENATAL (OUTPATIENT)
Dept: OBSTETRICS AND GYNECOLOGY | Facility: CLINIC | Age: 25
End: 2020-10-05

## 2020-10-05 VITALS — WEIGHT: 204.4 LBS | BODY MASS INDEX: 32.01 KG/M2 | SYSTOLIC BLOOD PRESSURE: 120 MMHG | DIASTOLIC BLOOD PRESSURE: 82 MMHG

## 2020-10-05 DIAGNOSIS — O30.041 DICHORIONIC DIAMNIOTIC TWIN PREGNANCY IN FIRST TRIMESTER: ICD-10-CM

## 2020-10-05 DIAGNOSIS — Z20.828 EXPOSURE TO CYTOMEGALOVIRUS (CMV): ICD-10-CM

## 2020-10-05 DIAGNOSIS — Z3A.27 27 WEEKS GESTATION OF PREGNANCY: Primary | ICD-10-CM

## 2020-10-05 DIAGNOSIS — O35.09X0 PREGNANCY COMPLICATED BY FETAL CEREBRAL VENTRICULOMEGALY, SINGLE OR UNSPECIFIED FETUS: ICD-10-CM

## 2020-10-05 DIAGNOSIS — O09.299 HX OF PREECLAMPSIA, PRIOR PREGNANCY, CURRENTLY PREGNANT: ICD-10-CM

## 2020-10-05 PROCEDURE — 0502F SUBSEQUENT PRENATAL CARE: CPT | Performed by: OBSTETRICS & GYNECOLOGY

## 2020-10-05 NOTE — PROGRESS NOTES
CC: Prenatal visit    Vidhi Moody is a 25 y.o.  at 27w2d.  Doing well.  Denies contractions, LOF, or VB.  Reports good FM.    /82   Wt 92.7 kg (204 lb 6.4 oz)   LMP 2020 (Exact Date)   BMI 32.01 kg/m²   SVE: Not done  Fundal Height (cm): 33 cm  Fetal Heart Rate: 160/140     Problems (from 20 to present)     Problem Noted Resolved    Pregnancy complicated by fetal cerebral ventriculomegaly 2020 by Diego Tate MD No    Priority:  High      Hx of preeclampsia, prior pregnancy, currently pregnant 2020 by Diego Tate MD No    Priority:  High      Exposure to cytomegalovirus (CMV) 2020 by Diego Tate MD No    Twin gestation in first trimester 2020 by Diego aTte MD No          A/P: Vidhi Moody is a 25 y.o.  at 27w2d.  - RTC in 1 weeks  - Reviewed COVID-19 visitation policy  - Reviewed COVID-19 precautions     Diagnosis Plan   1. 27 weeks gestation of pregnancy     2. Exposure to cytomegalovirus (CMV)     3. Pregnancy complicated by fetal cerebral ventriculomegaly, single or unspecified fetus   recent perinatology consult with Dr. Lombardi   4. Hx of preeclampsia, prior pregnancy, currently pregnant     5. Dichorionic diamniotic twin pregnancy in first trimester   recent perinatology consultation Dr. Lombardi   Patient's blood pressure is good today says blood pressures been good at home denies any headaches visual changes epigastric pain DTRs 2+  Diego Tate MD  10/5/2020  17:25 CDT

## 2020-10-08 LAB — CYTOMEGALOVIRUS IGG AB AVIDITY: 0.89

## 2020-10-13 DIAGNOSIS — Z36.2 ENCOUNTER FOR OTHER ANTENATAL SCREENING FOLLOW-UP: Primary | ICD-10-CM

## 2020-10-14 DIAGNOSIS — Z36.2 ENCOUNTER FOR OTHER ANTENATAL SCREENING FOLLOW-UP: ICD-10-CM

## 2020-10-19 ENCOUNTER — ROUTINE PRENATAL (OUTPATIENT)
Dept: OBSTETRICS AND GYNECOLOGY | Facility: CLINIC | Age: 25
End: 2020-10-19

## 2020-10-19 VITALS — SYSTOLIC BLOOD PRESSURE: 128 MMHG | WEIGHT: 209 LBS | DIASTOLIC BLOOD PRESSURE: 82 MMHG | BODY MASS INDEX: 32.73 KG/M2

## 2020-10-19 DIAGNOSIS — Z3A.29 29 WEEKS GESTATION OF PREGNANCY: Primary | ICD-10-CM

## 2020-10-19 DIAGNOSIS — O30.041 DICHORIONIC DIAMNIOTIC TWIN PREGNANCY IN FIRST TRIMESTER: ICD-10-CM

## 2020-10-19 DIAGNOSIS — O35.09X0 PREGNANCY COMPLICATED BY FETAL CEREBRAL VENTRICULOMEGALY, SINGLE OR UNSPECIFIED FETUS: ICD-10-CM

## 2020-10-19 DIAGNOSIS — O09.299 HX OF PREECLAMPSIA, PRIOR PREGNANCY, CURRENTLY PREGNANT: ICD-10-CM

## 2020-10-19 DIAGNOSIS — Z20.828 EXPOSURE TO CYTOMEGALOVIRUS (CMV): ICD-10-CM

## 2020-10-19 PROCEDURE — 0502F SUBSEQUENT PRENATAL CARE: CPT | Performed by: OBSTETRICS & GYNECOLOGY

## 2020-10-19 NOTE — PROGRESS NOTES
CC: Prenatal visit    Vidhi Moody is a 25 y.o.  at 29w2d.  Doing well.  Denies contractions, LOF, or VB.  Reports good FM.    /82   Wt 94.8 kg (209 lb)   LMP 2020 (Exact Date)   BMI 32.73 kg/m²   SVE: Not done  Fundal Height (cm): 33 cm  Fetal Heart Rate: 150/130     Problems (from 20 to present)     Problem Noted Resolved    Pregnancy complicated by fetal cerebral ventriculomegaly 2020 by Diego Tate MD No    Priority:  High      Hx of preeclampsia, prior pregnancy, currently pregnant 2020 by Diego Tate MD No    Priority:  High      Exposure to cytomegalovirus (CMV) 2020 by Diego Tate MD No    Twin gestation in first trimester 2020 by Diego Tate MD No          A/P: Vidhi Moody is a 25 y.o.  at 29w2d.  - RTC in 2 weeks  - Reviewed COVID-19 visitation policy  - Reviewed COVID-19 precautions     Diagnosis Plan   1. 29 weeks gestation of pregnancy   patient denies contractions or pressure symptoms doing better since not working   2. Exposure to cytomegalovirus (CMV)     3. Pregnancy complicated by fetal cerebral ventriculomegaly, single or unspecified fetus     4. Hx of preeclampsia, prior pregnancy, currently pregnant     5. Dichorionic diamniotic twin pregnancy in first trimester       Diego Tate MD  10/19/2020  09:03 CDT

## 2020-10-28 ENCOUNTER — ROUTINE PRENATAL (OUTPATIENT)
Dept: OBSTETRICS AND GYNECOLOGY | Facility: CLINIC | Age: 25
End: 2020-10-28

## 2020-10-28 DIAGNOSIS — O35.09X0 PREGNANCY COMPLICATED BY FETAL CEREBRAL VENTRICULOMEGALY, SINGLE OR UNSPECIFIED FETUS: ICD-10-CM

## 2020-10-28 DIAGNOSIS — O30.041 DICHORIONIC DIAMNIOTIC TWIN PREGNANCY IN FIRST TRIMESTER: ICD-10-CM

## 2020-10-28 DIAGNOSIS — O09.299 HX OF PREECLAMPSIA, PRIOR PREGNANCY, CURRENTLY PREGNANT: ICD-10-CM

## 2020-10-28 DIAGNOSIS — Z20.828 EXPOSURE TO CYTOMEGALOVIRUS (CMV): ICD-10-CM

## 2020-10-28 DIAGNOSIS — O36.5931 POOR FETAL GROWTH AFFECTING MANAGEMENT OF MOTHER IN THIRD TRIMESTER, FETUS 1 OF MULTIPLE GESTATION: Primary | ICD-10-CM

## 2020-10-28 PROCEDURE — 0502F SUBSEQUENT PRENATAL CARE: CPT | Performed by: OBSTETRICS & GYNECOLOGY

## 2020-10-28 RX ORDER — GUAIFENESIN 600 MG/1
600 TABLET, EXTENDED RELEASE ORAL 2 TIMES DAILY
COMMUNITY
End: 2020-11-25

## 2020-10-28 RX ORDER — CHOLECALCIFEROL (VITAMIN D3) 125 MCG
10 CAPSULE ORAL NIGHTLY
COMMUNITY
End: 2021-01-21

## 2020-11-01 VITALS — WEIGHT: 207.2 LBS | DIASTOLIC BLOOD PRESSURE: 76 MMHG | SYSTOLIC BLOOD PRESSURE: 122 MMHG | BODY MASS INDEX: 32.45 KG/M2

## 2020-11-01 PROBLEM — O36.5930 POOR FETAL GROWTH AFFECTING MANAGEMENT OF MOTHER IN THIRD TRIMESTER: Status: ACTIVE | Noted: 2020-11-01

## 2020-11-01 NOTE — PROGRESS NOTES
CC: Prenatal visit    Vidhi Moody is a 25 y.o.  at 31w1d.  Doing well.  Denies contractions, LOF, or VB.  Reports good FM.    /76   Wt 94 kg (207 lb 3.2 oz)   LMP 2020 (Exact Date)   BMI 32.45 kg/m²   SVE: Not done  Fundal Height (cm): 34 cm  Fetal Heart Rate: 146/146     Problems (from 20 to present)     Problem Noted Resolved    Pregnancy complicated by fetal cerebral ventriculomegaly 2020 by Diego Tate MD No    Priority:  High      Hx of preeclampsia, prior pregnancy, currently pregnant 2020 by Diego Tate MD No    Priority:  High      Exposure to cytomegalovirus (CMV) 2020 by Diego Tate MD No    Twin gestation in first trimester 2020 by Diego Tate MD No          A/P: Vidhi Moody is a 25 y.o.  at 31w1d.  - RTC in 1 weeks  - Reviewed COVID-19 visitation policy  - Reviewed COVID-19 precautions     Diagnosis Plan   1. Poor fetal growth affecting management of mother in third trimester, fetus 1 of multiple gestation  US Fetal Biophysical Profile;Without Non-Stress Testing baby A is at the 10th percentile there is no discordance however.  I read the official report from Dr. Lombardi we have a biophysical profile set up for next week   2. Exposure to cytomegalovirus (CMV)   avidity reassuring   3. Pregnancy complicated by fetal cerebral ventriculomegaly, single or unspecified fetus     4. Hx of preeclampsia, prior pregnancy, currently pregnant   denies headaches visual changes or epigastric pain blood pressures are good at home she has been monitoring them   5. Dichorionic diamniotic twin pregnancy in first trimester       Diego Tate MD  2020  13:20 CST

## 2020-11-04 ENCOUNTER — ROUTINE PRENATAL (OUTPATIENT)
Dept: OBSTETRICS AND GYNECOLOGY | Facility: CLINIC | Age: 25
End: 2020-11-04

## 2020-11-04 VITALS — DIASTOLIC BLOOD PRESSURE: 70 MMHG | WEIGHT: 209 LBS | SYSTOLIC BLOOD PRESSURE: 122 MMHG | BODY MASS INDEX: 32.73 KG/M2

## 2020-11-04 DIAGNOSIS — M54.9 BACK PAIN AFFECTING PREGNANCY IN THIRD TRIMESTER: Primary | ICD-10-CM

## 2020-11-04 DIAGNOSIS — O35.09X0 PREGNANCY COMPLICATED BY FETAL CEREBRAL VENTRICULOMEGALY, SINGLE OR UNSPECIFIED FETUS: ICD-10-CM

## 2020-11-04 DIAGNOSIS — O30.041 DICHORIONIC DIAMNIOTIC TWIN PREGNANCY IN FIRST TRIMESTER: ICD-10-CM

## 2020-11-04 DIAGNOSIS — Z20.828 EXPOSURE TO CYTOMEGALOVIRUS (CMV): ICD-10-CM

## 2020-11-04 DIAGNOSIS — O09.299 HX OF PREECLAMPSIA, PRIOR PREGNANCY, CURRENTLY PREGNANT: ICD-10-CM

## 2020-11-04 DIAGNOSIS — O99.891 BACK PAIN AFFECTING PREGNANCY IN THIRD TRIMESTER: Primary | ICD-10-CM

## 2020-11-04 PROCEDURE — 0502F SUBSEQUENT PRENATAL CARE: CPT | Performed by: OBSTETRICS & GYNECOLOGY

## 2020-11-05 ENCOUNTER — LAB (OUTPATIENT)
Dept: LAB | Facility: HOSPITAL | Age: 25
End: 2020-11-05

## 2020-11-05 DIAGNOSIS — M54.9 BACK PAIN AFFECTING PREGNANCY IN THIRD TRIMESTER: ICD-10-CM

## 2020-11-05 DIAGNOSIS — O99.891 BACK PAIN AFFECTING PREGNANCY IN THIRD TRIMESTER: ICD-10-CM

## 2020-11-05 LAB
BACTERIA UR QL AUTO: ABNORMAL /HPF
BILIRUB UR QL STRIP: NEGATIVE
CLARITY UR: ABNORMAL
COD CRY URNS QL: ABNORMAL /HPF
COLOR UR: ABNORMAL
GLUCOSE UR STRIP-MCNC: NEGATIVE MG/DL
HGB UR QL STRIP.AUTO: NEGATIVE
HYALINE CASTS UR QL AUTO: ABNORMAL /LPF
KETONES UR QL STRIP: ABNORMAL
LEUKOCYTE ESTERASE UR QL STRIP.AUTO: ABNORMAL
NITRITE UR QL STRIP: NEGATIVE
PH UR STRIP.AUTO: 6.5 [PH] (ref 5–8)
PROT UR QL STRIP: ABNORMAL
RBC # UR: ABNORMAL /HPF
REF LAB TEST METHOD: ABNORMAL
SP GR UR STRIP: 1.02 (ref 1–1.03)
SQUAMOUS #/AREA URNS HPF: ABNORMAL /HPF
UROBILINOGEN UR QL STRIP: ABNORMAL
WBC UR QL AUTO: ABNORMAL /HPF

## 2020-11-05 PROCEDURE — 81001 URINALYSIS AUTO W/SCOPE: CPT

## 2020-11-05 PROCEDURE — 87086 URINE CULTURE/COLONY COUNT: CPT

## 2020-11-06 ENCOUNTER — TELEPHONE (OUTPATIENT)
Dept: OBSTETRICS AND GYNECOLOGY | Facility: CLINIC | Age: 25
End: 2020-11-06

## 2020-11-06 DIAGNOSIS — R30.0 DYSURIA: Primary | ICD-10-CM

## 2020-11-06 LAB — BACTERIA SPEC AEROBE CULT: NORMAL

## 2020-11-06 NOTE — TELEPHONE ENCOUNTER
Urinalysis reviewed with patient contaminated growth on culture.  I Sherron put another one in actually symptoms better will get if she has recurrence of symptoms.    Ultrasound reading reviewed with patient

## 2020-11-06 NOTE — PROGRESS NOTES
CC: Prenatal visit    Vidhi Moody is a 25 y.o.  at 31w6d.  Doing well.  Denies contractions, LOF, or VB.  Reports good FM.    /70   Wt 94.8 kg (209 lb)   LMP 2020 (Exact Date)   BMI 32.73 kg/m²   SVE: Not done           Problems (from 20 to present)     Problem Noted Resolved    Pregnancy complicated by fetal cerebral ventriculomegaly 2020 by Diego Tate MD No    Priority:  High      Hx of preeclampsia, prior pregnancy, currently pregnant 2020 by Diego Tate MD No    Priority:  High      Exposure to cytomegalovirus (CMV) 2020 by Diego Tate MD No    Twin gestation in first trimester 2020 by Diego Tate MD No          A/P: Vidhi Moody is a 25 y.o.  at 31w6d.  - RTC in 1 weeks  - Reviewed COVID-19 visitation policy  - Reviewed COVID-19 precautions     Diagnosis Plan   1. Back pain affecting pregnancy in third trimester  Urine Culture - Urine, Urine, Clean Catch    Urinalysis With Microscopic - Urine, Clean Catch   2. Exposure to cytomegalovirus (CMV)  US Fetal Biophysical Profile;Without Non-Stress Testing    US Fetal Biophysical Profile;Without Non-Stress Testing    US Fetal Biophysical Profile;Without Non-Stress Testing    US Fetal Biophysical Profile;Without Non-Stress Testing    US Fetal Biophysical Profile;Without Non-Stress Testing   3. Pregnancy complicated by fetal cerebral ventriculomegaly, single or unspecified fetus  US Fetal Biophysical Profile;Without Non-Stress Testing    US Fetal Biophysical Profile;Without Non-Stress Testing    US Fetal Biophysical Profile;Without Non-Stress Testing    US Fetal Biophysical Profile;Without Non-Stress Testing    US Fetal Biophysical Profile;Without Non-Stress Testing   4. Hx of preeclampsia, prior pregnancy, currently pregnant  US Fetal Biophysical Profile;Without Non-Stress Testing    US Fetal Biophysical Profile;Without Non-Stress Testing    US Fetal Biophysical  Profile;Without Non-Stress Testing    US Fetal Biophysical Profile;Without Non-Stress Testing    US Fetal Biophysical Profile;Without Non-Stress Testing   5. Dichorionic diamniotic twin pregnancy in first trimester  US Fetal Biophysical Profile;Without Non-Stress Testing    US Fetal Biophysical Profile;Without Non-Stress Testing    US Fetal Biophysical Profile;Without Non-Stress Testing    US Fetal Biophysical Profile;Without Non-Stress Testing    US Fetal Biophysical Profile;Without Non-Stress Testing     Diego Tate MD  11/6/2020  13:21 CST

## 2020-11-11 ENCOUNTER — ROUTINE PRENATAL (OUTPATIENT)
Dept: OBSTETRICS AND GYNECOLOGY | Facility: CLINIC | Age: 25
End: 2020-11-11

## 2020-11-11 VITALS — SYSTOLIC BLOOD PRESSURE: 122 MMHG | BODY MASS INDEX: 32.89 KG/M2 | WEIGHT: 210 LBS | DIASTOLIC BLOOD PRESSURE: 70 MMHG

## 2020-11-11 DIAGNOSIS — O35.09X0 PREGNANCY COMPLICATED BY FETAL CEREBRAL VENTRICULOMEGALY, SINGLE OR UNSPECIFIED FETUS: ICD-10-CM

## 2020-11-11 DIAGNOSIS — Z20.828 EXPOSURE TO CYTOMEGALOVIRUS (CMV): ICD-10-CM

## 2020-11-11 DIAGNOSIS — Z3A.32 32 WEEKS GESTATION OF PREGNANCY: Primary | ICD-10-CM

## 2020-11-11 DIAGNOSIS — O09.299 HX OF PREECLAMPSIA, PRIOR PREGNANCY, CURRENTLY PREGNANT: ICD-10-CM

## 2020-11-11 DIAGNOSIS — O30.041 DICHORIONIC DIAMNIOTIC TWIN PREGNANCY IN FIRST TRIMESTER: ICD-10-CM

## 2020-11-11 PROBLEM — Z3A.08 8 WEEKS GESTATION OF PREGNANCY: Status: RESOLVED | Noted: 2020-05-27 | Resolved: 2020-11-11

## 2020-11-11 PROBLEM — Z3A.11 11 WEEKS GESTATION OF PREGNANCY: Status: RESOLVED | Noted: 2020-06-17 | Resolved: 2020-11-11

## 2020-11-11 PROCEDURE — 0502F SUBSEQUENT PRENATAL CARE: CPT | Performed by: NURSE PRACTITIONER

## 2020-11-11 NOTE — PROGRESS NOTES
CC: Prenatal visit    Vidhi Moody is a 25 y.o.  at 32w4d.  Doing well.  No complaints.  Denies contractions, LOF, or VB.  Reports good FM.    /70   Wt 95.3 kg (210 lb)   LMP 2020 (Exact Date)   BMI 32.89 kg/m²     US prelim- Fetus A BPP 8/8, S/D 3.65, PI 1.23         Fetus B BPP 8/8, S/D 2.61, PI 0.98    Dr. Patiño recommended changing fetal growth scan to every 2 weeks         Fundal Height (cm): 36 cm  Fetal Heart Rate: 141/141     Problems (from 20 to present)     Problem Noted Resolved    Exposure to cytomegalovirus (CMV) 2020 by Diego Tate MD No    Pregnancy complicated by fetal cerebral ventriculomegaly 2020 by Diego Tate MD No    Twin gestation in first trimester 2020 by Diego Tate MD No    Hx of preeclampsia, prior pregnancy, currently pregnant 2020 by Diego Tate MD No          A/P: Vidhi Moody is a 25 y.o.  at 32w4d.  - RTC in 1 weeks for LORE araceli and scan     Diagnosis Plan   1. 32 weeks gestation of pregnancy     2. Dichorionic diamniotic twin pregnancy in first trimester  US Ob Follow Up Transabdominal Approach   3. Exposure to cytomegalovirus (CMV)     4. Pregnancy complicated by fetal cerebral ventriculomegaly, single or unspecified fetus  US Ob Follow Up Transabdominal Approach   5. Hx of preeclampsia, prior pregnancy, currently pregnant  US Ob Follow Up Transabdominal Approach       ADIA Neff  2020  09:03 CST

## 2020-11-18 ENCOUNTER — ROUTINE PRENATAL (OUTPATIENT)
Dept: OBSTETRICS AND GYNECOLOGY | Facility: CLINIC | Age: 25
End: 2020-11-18

## 2020-11-18 VITALS — SYSTOLIC BLOOD PRESSURE: 126 MMHG | DIASTOLIC BLOOD PRESSURE: 78 MMHG | BODY MASS INDEX: 33.52 KG/M2 | WEIGHT: 214 LBS

## 2020-11-18 DIAGNOSIS — Z3A.33 33 WEEKS GESTATION OF PREGNANCY: Primary | ICD-10-CM

## 2020-11-18 DIAGNOSIS — O30.041 DICHORIONIC DIAMNIOTIC TWIN PREGNANCY IN FIRST TRIMESTER: ICD-10-CM

## 2020-11-18 DIAGNOSIS — O35.09X0 PREGNANCY COMPLICATED BY FETAL CEREBRAL VENTRICULOMEGALY, SINGLE OR UNSPECIFIED FETUS: ICD-10-CM

## 2020-11-18 DIAGNOSIS — O09.299 HX OF PREECLAMPSIA, PRIOR PREGNANCY, CURRENTLY PREGNANT: ICD-10-CM

## 2020-11-18 DIAGNOSIS — Z20.828 EXPOSURE TO CYTOMEGALOVIRUS (CMV): ICD-10-CM

## 2020-11-18 PROCEDURE — 0502F SUBSEQUENT PRENATAL CARE: CPT | Performed by: OBSTETRICS & GYNECOLOGY

## 2020-11-20 ENCOUNTER — RESULTS ENCOUNTER (OUTPATIENT)
Dept: OBSTETRICS AND GYNECOLOGY | Facility: CLINIC | Age: 25
End: 2020-11-20

## 2020-11-20 DIAGNOSIS — O09.299 HX OF PREECLAMPSIA, PRIOR PREGNANCY, CURRENTLY PREGNANT: ICD-10-CM

## 2020-11-20 DIAGNOSIS — O35.09X0 PREGNANCY COMPLICATED BY FETAL CEREBRAL VENTRICULOMEGALY, SINGLE OR UNSPECIFIED FETUS: ICD-10-CM

## 2020-11-20 DIAGNOSIS — O30.041 DICHORIONIC DIAMNIOTIC TWIN PREGNANCY IN FIRST TRIMESTER: ICD-10-CM

## 2020-11-22 NOTE — PROGRESS NOTES
CC: Prenatal visit    Vidhi Moody is a 25 y.o.  at 34w0d.  Doing well.  Denies contractions, LOF, or VB.  Reports good FM.    /78   Wt 97.1 kg (214 lb)   LMP 2020 (Exact Date)   BMI 33.52 kg/m²   SVE: Not done  Fundal Height (cm): 37 cm  Fetal Heart Rate: 149/144     Problems (from 20 to present)     Problem Noted Resolved    Pregnancy complicated by fetal cerebral ventriculomegaly 2020 by Diego Tate MD No    Priority:  High      Hx of preeclampsia, prior pregnancy, currently pregnant 2020 by Diego Tate MD No    Priority:  High      Exposure to cytomegalovirus (CMV) 2020 by Diego Tate MD No    Twin gestation in first trimester 2020 by Diego Tate MD No          A/P: Vidhi Moody is a 25 y.o.  at 34w0d.  - RTC in 1 weeks  - Reviewed COVID-19 visitation policy  - Reviewed COVID-19 precautions     Diagnosis Plan   1. 33 weeks gestation of pregnancy     2. Exposure to cytomegalovirus (CMV)     3. Pregnancy complicated by fetal cerebral ventriculomegaly, single or unspecified fetus   recent studies have been okay   4. Hx of preeclampsia, prior pregnancy, currently pregnant     5. Dichorionic diamniotic twin pregnancy in first trimester       Diego Tate MD  2020  19:42 CST

## 2020-11-25 ENCOUNTER — ANESTHESIA (OUTPATIENT)
Dept: LABOR AND DELIVERY | Facility: HOSPITAL | Age: 25
End: 2020-11-25

## 2020-11-25 ENCOUNTER — HOSPITAL ENCOUNTER (INPATIENT)
Facility: HOSPITAL | Age: 25
LOS: 5 days | Discharge: HOME | End: 2020-11-30
Attending: OBSTETRICS & GYNECOLOGY | Admitting: OBSTETRICS & GYNECOLOGY

## 2020-11-25 ENCOUNTER — ROUTINE PRENATAL (OUTPATIENT)
Dept: OBSTETRICS AND GYNECOLOGY | Facility: CLINIC | Age: 25
End: 2020-11-25

## 2020-11-25 ENCOUNTER — ANESTHESIA EVENT (OUTPATIENT)
Dept: LABOR AND DELIVERY | Facility: HOSPITAL | Age: 25
End: 2020-11-25

## 2020-11-25 VITALS — BODY MASS INDEX: 34.3 KG/M2 | SYSTOLIC BLOOD PRESSURE: 152 MMHG | DIASTOLIC BLOOD PRESSURE: 102 MMHG | WEIGHT: 219 LBS

## 2020-11-25 DIAGNOSIS — O14.93 PRE-ECLAMPSIA IN THIRD TRIMESTER: ICD-10-CM

## 2020-11-25 DIAGNOSIS — O16.3 HIGH BLOOD PRESSURE AFFECTING PREGNANCY IN THIRD TRIMESTER, ANTEPARTUM: ICD-10-CM

## 2020-11-25 DIAGNOSIS — Z3A.34 34 WEEKS GESTATION OF PREGNANCY: ICD-10-CM

## 2020-11-25 DIAGNOSIS — O30.041 DICHORIONIC DIAMNIOTIC TWIN PREGNANCY IN FIRST TRIMESTER: ICD-10-CM

## 2020-11-25 DIAGNOSIS — O14.13 SEVERE PRE-ECLAMPSIA IN THIRD TRIMESTER: Primary | ICD-10-CM

## 2020-11-25 DIAGNOSIS — Z98.891 HISTORY OF CESAREAN SECTION: ICD-10-CM

## 2020-11-25 DIAGNOSIS — O09.299 HX OF PREECLAMPSIA, PRIOR PREGNANCY, CURRENTLY PREGNANT: ICD-10-CM

## 2020-11-25 DIAGNOSIS — O35.09X0 PREGNANCY COMPLICATED BY FETAL CEREBRAL VENTRICULOMEGALY, SINGLE OR UNSPECIFIED FETUS: ICD-10-CM

## 2020-11-25 DIAGNOSIS — Z20.828 EXPOSURE TO CYTOMEGALOVIRUS (CMV): Primary | ICD-10-CM

## 2020-11-25 PROBLEM — O14.10 SEVERE PREECLAMPSIA: Status: ACTIVE | Noted: 2020-11-25

## 2020-11-25 PROBLEM — O10.013 PRE-EXISTING ESSENTIAL HYPERTENSION DURING PREGNANCY IN THIRD TRIMESTER: Status: ACTIVE | Noted: 2020-11-25

## 2020-11-25 LAB
ABO GROUP BLD: NORMAL
ALBUMIN SERPL-MCNC: 3.4 G/DL (ref 3.5–5.2)
ALBUMIN/GLOB SERPL: 1.2 G/DL
ALP SERPL-CCNC: 131 U/L (ref 39–117)
ALT SERPL W P-5'-P-CCNC: 13 U/L (ref 1–33)
AMPHET+METHAMPHET UR QL: NEGATIVE
AMPHETAMINES UR QL: NEGATIVE
ANION GAP SERPL CALCULATED.3IONS-SCNC: 12 MMOL/L (ref 5–15)
AST SERPL-CCNC: 33 U/L (ref 1–32)
BACTERIA UR QL AUTO: ABNORMAL /HPF
BARBITURATES UR QL SCN: NEGATIVE
BASOPHILS # BLD MANUAL: 0.15 10*3/MM3 (ref 0–0.2)
BASOPHILS NFR BLD AUTO: 1 % (ref 0–1.5)
BENZODIAZ UR QL SCN: NEGATIVE
BILIRUB SERPL-MCNC: 0.4 MG/DL (ref 0–1.2)
BILIRUB UR QL STRIP: NEGATIVE
BLD GP AB SCN SERPL QL: NEGATIVE
BUN SERPL-MCNC: 6 MG/DL (ref 6–20)
BUN/CREAT SERPL: 9.7 (ref 7–25)
BUPRENORPHINE SERPL-MCNC: NEGATIVE NG/ML
CALCIUM SPEC-SCNC: 9.3 MG/DL (ref 8.6–10.5)
CANNABINOIDS SERPL QL: NEGATIVE
CHLORIDE SERPL-SCNC: 105 MMOL/L (ref 98–107)
CLARITY UR: ABNORMAL
CO2 SERPL-SCNC: 20 MMOL/L (ref 22–29)
COCAINE UR QL: NEGATIVE
COLOR UR: YELLOW
CREAT SERPL-MCNC: 0.62 MG/DL (ref 0.57–1)
CREAT UR-MCNC: 172.3 MG/DL
DEPRECATED RDW RBC AUTO: 43 FL (ref 37–54)
ERYTHROCYTE [DISTWIDTH] IN BLOOD BY AUTOMATED COUNT: 14 % (ref 12.3–15.4)
GFR SERPL CREATININE-BSD FRML MDRD: 115 ML/MIN/1.73
GLOBULIN UR ELPH-MCNC: 2.9 GM/DL
GLUCOSE SERPL-MCNC: 74 MG/DL (ref 65–99)
GLUCOSE UR STRIP-MCNC: NEGATIVE MG/DL
HCT VFR BLD AUTO: 36.8 % (ref 34–46.6)
HGB BLD-MCNC: 12.8 G/DL (ref 12–15.9)
HGB UR QL STRIP.AUTO: NEGATIVE
HYALINE CASTS UR QL AUTO: ABNORMAL /LPF
KETONES UR QL STRIP: ABNORMAL
LEUKOCYTE ESTERASE UR QL STRIP.AUTO: ABNORMAL
LYMPHOCYTES # BLD MANUAL: 3.41 10*3/MM3 (ref 0.7–3.1)
LYMPHOCYTES NFR BLD MANUAL: 22 % (ref 19.6–45.3)
LYMPHOCYTES NFR BLD MANUAL: 7 % (ref 5–12)
Lab: NORMAL
MCH RBC QN AUTO: 29.4 PG (ref 26.6–33)
MCHC RBC AUTO-ENTMCNC: 34.8 G/DL (ref 31.5–35.7)
MCV RBC AUTO: 84.4 FL (ref 79–97)
METHADONE UR QL SCN: NEGATIVE
MONOCYTES # BLD AUTO: 1.08 10*3/MM3 (ref 0.1–0.9)
NEUTROPHILS # BLD AUTO: 10.84 10*3/MM3 (ref 1.7–7)
NEUTROPHILS NFR BLD MANUAL: 70 % (ref 42.7–76)
NITRITE UR QL STRIP: NEGATIVE
OPIATES UR QL: NEGATIVE
OXYCODONE UR QL SCN: NEGATIVE
PCP UR QL SCN: NEGATIVE
PH UR STRIP.AUTO: 6.5 [PH] (ref 5–9)
PLATELET # BLD AUTO: 141 10*3/MM3 (ref 140–450)
PMV BLD AUTO: 11.7 FL (ref 6–12)
POTASSIUM SERPL-SCNC: 4 MMOL/L (ref 3.5–5.2)
PROPOXYPH UR QL: NEGATIVE
PROT SERPL-MCNC: 6.3 G/DL (ref 6–8.5)
PROT UR QL STRIP: ABNORMAL
PROT UR-MCNC: 58 MG/DL
PROT/CREAT UR: 336.6 MG/G CREA (ref 0–200)
RBC # BLD AUTO: 4.36 10*6/MM3 (ref 3.77–5.28)
RBC # UR: ABNORMAL /HPF
RBC MORPH BLD: NORMAL
REF LAB TEST METHOD: ABNORMAL
RH BLD: POSITIVE
SMALL PLATELETS BLD QL SMEAR: ABNORMAL
SODIUM SERPL-SCNC: 137 MMOL/L (ref 136–145)
SP GR UR STRIP: 1.02 (ref 1–1.03)
SQUAMOUS #/AREA URNS HPF: ABNORMAL /HPF
T&S EXPIRATION DATE: NORMAL
TRICYCLICS UR QL SCN: NEGATIVE
UROBILINOGEN UR QL STRIP: ABNORMAL
WBC # BLD AUTO: 15.49 10*3/MM3 (ref 3.4–10.8)
WBC MORPH BLD: NORMAL
WBC UR QL AUTO: ABNORMAL /HPF

## 2020-11-25 PROCEDURE — 25010000002 BETAMETHASONE ACET & SOD PHOS PER 4 MG: Performed by: OBSTETRICS & GYNECOLOGY

## 2020-11-25 PROCEDURE — 85007 BL SMEAR W/DIFF WBC COUNT: CPT | Performed by: OBSTETRICS & GYNECOLOGY

## 2020-11-25 PROCEDURE — 25010000002 MORPHINE PER 10 MG: Performed by: NURSE ANESTHETIST, CERTIFIED REGISTERED

## 2020-11-25 PROCEDURE — 81001 URINALYSIS AUTO W/SCOPE: CPT | Performed by: OBSTETRICS & GYNECOLOGY

## 2020-11-25 PROCEDURE — 82570 ASSAY OF URINE CREATININE: CPT | Performed by: OBSTETRICS & GYNECOLOGY

## 2020-11-25 PROCEDURE — 25010000002 FENTANYL CITRATE (PF) 100 MCG/2ML SOLUTION: Performed by: NURSE ANESTHETIST, CERTIFIED REGISTERED

## 2020-11-25 PROCEDURE — 25010000002 ONDANSETRON PER 1 MG: Performed by: NURSE ANESTHETIST, CERTIFIED REGISTERED

## 2020-11-25 PROCEDURE — 80306 DRUG TEST PRSMV INSTRMNT: CPT | Performed by: OBSTETRICS & GYNECOLOGY

## 2020-11-25 PROCEDURE — 88307 TISSUE EXAM BY PATHOLOGIST: CPT

## 2020-11-25 PROCEDURE — 25010000003 MAGNESIUM SULFATE 20 GM/500ML SOLUTION: Performed by: OBSTETRICS & GYNECOLOGY

## 2020-11-25 PROCEDURE — 25010000002 KETOROLAC TROMETHAMINE PER 15 MG: Performed by: OBSTETRICS & GYNECOLOGY

## 2020-11-25 PROCEDURE — 94799 UNLISTED PULMONARY SVC/PX: CPT

## 2020-11-25 PROCEDURE — 0502F SUBSEQUENT PRENATAL CARE: CPT | Performed by: OBSTETRICS & GYNECOLOGY

## 2020-11-25 PROCEDURE — 86850 RBC ANTIBODY SCREEN: CPT | Performed by: OBSTETRICS & GYNECOLOGY

## 2020-11-25 PROCEDURE — 85027 COMPLETE CBC AUTOMATED: CPT | Performed by: OBSTETRICS & GYNECOLOGY

## 2020-11-25 PROCEDURE — 25010000002 DIPHENHYDRAMINE PER 50 MG: Performed by: OBSTETRICS & GYNECOLOGY

## 2020-11-25 PROCEDURE — 25010000003 CEFAZOLIN PER 500 MG: Performed by: NURSE ANESTHETIST, CERTIFIED REGISTERED

## 2020-11-25 PROCEDURE — 59514 CESAREAN DELIVERY ONLY: CPT | Performed by: SPECIALIST/TECHNOLOGIST, OTHER

## 2020-11-25 PROCEDURE — 59510 CESAREAN DELIVERY: CPT | Performed by: OBSTETRICS & GYNECOLOGY

## 2020-11-25 PROCEDURE — 80053 COMPREHEN METABOLIC PANEL: CPT | Performed by: OBSTETRICS & GYNECOLOGY

## 2020-11-25 PROCEDURE — 84156 ASSAY OF PROTEIN URINE: CPT | Performed by: OBSTETRICS & GYNECOLOGY

## 2020-11-25 PROCEDURE — 86901 BLOOD TYPING SEROLOGIC RH(D): CPT | Performed by: OBSTETRICS & GYNECOLOGY

## 2020-11-25 PROCEDURE — G0463 HOSPITAL OUTPT CLINIC VISIT: HCPCS

## 2020-11-25 PROCEDURE — 51703 INSERT BLADDER CATH COMPLEX: CPT

## 2020-11-25 PROCEDURE — 86900 BLOOD TYPING SEROLOGIC ABO: CPT | Performed by: OBSTETRICS & GYNECOLOGY

## 2020-11-25 DEVICE — SEAL HEMO SURG ARISTA/AH ABS/PWDR 3GM: Type: IMPLANTABLE DEVICE | Site: STOMACH | Status: FUNCTIONAL

## 2020-11-25 RX ORDER — BUPIVACAINE HYDROCHLORIDE 7.5 MG/ML
INJECTION, SOLUTION EPIDURAL; RETROBULBAR
Status: COMPLETED | OUTPATIENT
Start: 2020-11-25 | End: 2020-11-25

## 2020-11-25 RX ORDER — ONDANSETRON 2 MG/ML
INJECTION INTRAMUSCULAR; INTRAVENOUS AS NEEDED
Status: DISCONTINUED | OUTPATIENT
Start: 2020-11-25 | End: 2020-11-25 | Stop reason: SURG

## 2020-11-25 RX ORDER — ONDANSETRON 4 MG/1
4 TABLET, FILM COATED ORAL EVERY 6 HOURS PRN
Status: DISCONTINUED | OUTPATIENT
Start: 2020-11-25 | End: 2020-11-30 | Stop reason: HOSPADM

## 2020-11-25 RX ORDER — BETAMETHASONE SODIUM PHOSPHATE AND BETAMETHASONE ACETATE 3; 3 MG/ML; MG/ML
12 INJECTION, SUSPENSION INTRA-ARTICULAR; INTRALESIONAL; INTRAMUSCULAR; SOFT TISSUE EVERY 24 HOURS
Status: DISCONTINUED | OUTPATIENT
Start: 2020-11-25 | End: 2020-11-26

## 2020-11-25 RX ORDER — FENTANYL CITRATE 50 UG/ML
INJECTION, SOLUTION INTRAMUSCULAR; INTRAVENOUS AS NEEDED
Status: DISCONTINUED | OUTPATIENT
Start: 2020-11-25 | End: 2020-11-25 | Stop reason: SURG

## 2020-11-25 RX ORDER — PROMETHAZINE HYDROCHLORIDE 12.5 MG/1
12.5 SUPPOSITORY RECTAL EVERY 6 HOURS PRN
Status: DISCONTINUED | OUTPATIENT
Start: 2020-11-25 | End: 2020-11-30 | Stop reason: HOSPADM

## 2020-11-25 RX ORDER — ACETAMINOPHEN 325 MG/1
650 TABLET ORAL EVERY 4 HOURS PRN
Status: ACTIVE | OUTPATIENT
Start: 2020-11-25 | End: 2020-11-26

## 2020-11-25 RX ORDER — ONDANSETRON 2 MG/ML
4 INJECTION INTRAMUSCULAR; INTRAVENOUS EVERY 6 HOURS PRN
Status: DISCONTINUED | OUTPATIENT
Start: 2020-11-25 | End: 2020-11-25

## 2020-11-25 RX ORDER — NALOXONE HCL 0.4 MG/ML
0.04 VIAL (ML) INJECTION
Status: DISCONTINUED | OUTPATIENT
Start: 2020-11-25 | End: 2020-11-30 | Stop reason: HOSPADM

## 2020-11-25 RX ORDER — METHYLERGONOVINE MALEATE 0.2 MG/ML
200 INJECTION INTRAVENOUS ONCE AS NEEDED
Status: DISCONTINUED | OUTPATIENT
Start: 2020-11-25 | End: 2020-11-25

## 2020-11-25 RX ORDER — PROMETHAZINE HYDROCHLORIDE 12.5 MG/1
12.5 TABLET ORAL EVERY 6 HOURS PRN
Status: DISCONTINUED | OUTPATIENT
Start: 2020-11-25 | End: 2020-11-25

## 2020-11-25 RX ORDER — ACETAMINOPHEN 500 MG
1000 TABLET ORAL EVERY 8 HOURS
Status: DISCONTINUED | OUTPATIENT
Start: 2020-11-25 | End: 2020-11-30 | Stop reason: HOSPADM

## 2020-11-25 RX ORDER — OXYCODONE HYDROCHLORIDE 5 MG/1
5 TABLET ORAL EVERY 4 HOURS PRN
Status: DISCONTINUED | OUTPATIENT
Start: 2020-11-25 | End: 2020-11-30 | Stop reason: HOSPADM

## 2020-11-25 RX ORDER — CEFAZOLIN SODIUM 1 G/3ML
INJECTION, POWDER, FOR SOLUTION INTRAMUSCULAR; INTRAVENOUS AS NEEDED
Status: DISCONTINUED | OUTPATIENT
Start: 2020-11-25 | End: 2020-11-25 | Stop reason: SURG

## 2020-11-25 RX ORDER — OXYTOCIN/0.9 % SODIUM CHLORIDE 30/500 ML
PLASTIC BAG, INJECTION (ML) INTRAVENOUS
Status: COMPLETED
Start: 2020-11-25 | End: 2020-11-25

## 2020-11-25 RX ORDER — SODIUM CHLORIDE, SODIUM LACTATE, POTASSIUM CHLORIDE, CALCIUM CHLORIDE 600; 310; 30; 20 MG/100ML; MG/100ML; MG/100ML; MG/100ML
75 INJECTION, SOLUTION INTRAVENOUS CONTINUOUS
Status: DISCONTINUED | OUTPATIENT
Start: 2020-11-25 | End: 2020-11-30 | Stop reason: HOSPADM

## 2020-11-25 RX ORDER — IBUPROFEN 800 MG/1
800 TABLET ORAL EVERY 8 HOURS SCHEDULED
Status: DISCONTINUED | OUTPATIENT
Start: 2020-11-26 | End: 2020-11-30 | Stop reason: HOSPADM

## 2020-11-25 RX ORDER — PROMETHAZINE HYDROCHLORIDE 12.5 MG/1
12.5 SUPPOSITORY RECTAL EVERY 6 HOURS PRN
Status: DISCONTINUED | OUTPATIENT
Start: 2020-11-25 | End: 2020-11-25

## 2020-11-25 RX ORDER — PROMETHAZINE HYDROCHLORIDE 12.5 MG/1
12.5 TABLET ORAL EVERY 6 HOURS PRN
Status: DISCONTINUED | OUTPATIENT
Start: 2020-11-25 | End: 2020-11-30 | Stop reason: HOSPADM

## 2020-11-25 RX ORDER — SODIUM CHLORIDE, SODIUM LACTATE, POTASSIUM CHLORIDE, CALCIUM CHLORIDE 600; 310; 30; 20 MG/100ML; MG/100ML; MG/100ML; MG/100ML
INJECTION, SOLUTION INTRAVENOUS
Status: COMPLETED
Start: 2020-11-25 | End: 2020-11-25

## 2020-11-25 RX ORDER — DIPHENHYDRAMINE HYDROCHLORIDE 50 MG/ML
INJECTION INTRAMUSCULAR; INTRAVENOUS
Status: DISPENSED
Start: 2020-11-25 | End: 2020-11-26

## 2020-11-25 RX ORDER — MISOPROSTOL 200 UG/1
800 TABLET ORAL AS NEEDED
Status: DISCONTINUED | OUTPATIENT
Start: 2020-11-25 | End: 2020-11-25

## 2020-11-25 RX ORDER — DIPHENHYDRAMINE HYDROCHLORIDE 50 MG/ML
25 INJECTION INTRAMUSCULAR; INTRAVENOUS EVERY 6 HOURS PRN
Status: DISCONTINUED | OUTPATIENT
Start: 2020-11-25 | End: 2020-11-30 | Stop reason: HOSPADM

## 2020-11-25 RX ORDER — MAGNESIUM SULFATE HEPTAHYDRATE 40 MG/ML
2 INJECTION, SOLUTION INTRAVENOUS CONTINUOUS
Status: DISPENSED | OUTPATIENT
Start: 2020-11-25 | End: 2020-11-28

## 2020-11-25 RX ORDER — SODIUM CHLORIDE 0.9 % (FLUSH) 0.9 %
3-10 SYRINGE (ML) INJECTION AS NEEDED
Status: DISCONTINUED | OUTPATIENT
Start: 2020-11-25 | End: 2020-11-26

## 2020-11-25 RX ORDER — OXYTOCIN/0.9 % SODIUM CHLORIDE 30/500 ML
85 PLASTIC BAG, INJECTION (ML) INTRAVENOUS ONCE
Status: DISCONTINUED | OUTPATIENT
Start: 2020-11-25 | End: 2020-11-25

## 2020-11-25 RX ORDER — ONDANSETRON 2 MG/ML
4 INJECTION INTRAMUSCULAR; INTRAVENOUS EVERY 6 HOURS PRN
Status: DISCONTINUED | OUTPATIENT
Start: 2020-11-25 | End: 2020-11-30 | Stop reason: HOSPADM

## 2020-11-25 RX ORDER — NAPROXEN 500 MG/1
500 TABLET ORAL 2 TIMES DAILY PRN
Status: DISCONTINUED | OUTPATIENT
Start: 2020-11-25 | End: 2020-11-30 | Stop reason: HOSPADM

## 2020-11-25 RX ORDER — SODIUM CHLORIDE, SODIUM LACTATE, POTASSIUM CHLORIDE, CALCIUM CHLORIDE 600; 310; 30; 20 MG/100ML; MG/100ML; MG/100ML; MG/100ML
125 INJECTION, SOLUTION INTRAVENOUS CONTINUOUS
Status: DISCONTINUED | OUTPATIENT
Start: 2020-11-25 | End: 2020-11-30 | Stop reason: HOSPADM

## 2020-11-25 RX ORDER — TRISODIUM CITRATE DIHYDRATE AND CITRIC ACID MONOHYDRATE 500; 334 MG/5ML; MG/5ML
SOLUTION ORAL
Status: COMPLETED
Start: 2020-11-25 | End: 2020-11-25

## 2020-11-25 RX ORDER — POLYETHYLENE GLYCOL 3350 17 G/17G
17 POWDER, FOR SOLUTION ORAL DAILY
Status: DISCONTINUED | OUTPATIENT
Start: 2020-11-25 | End: 2020-11-30 | Stop reason: HOSPADM

## 2020-11-25 RX ORDER — CARBOPROST TROMETHAMINE 250 UG/ML
250 INJECTION, SOLUTION INTRAMUSCULAR AS NEEDED
Status: DISCONTINUED | OUTPATIENT
Start: 2020-11-25 | End: 2020-11-25

## 2020-11-25 RX ORDER — LABETALOL HYDROCHLORIDE 5 MG/ML
20-80 INJECTION, SOLUTION INTRAVENOUS
Status: DISCONTINUED | OUTPATIENT
Start: 2020-11-25 | End: 2020-11-30 | Stop reason: HOSPADM

## 2020-11-25 RX ORDER — MORPHINE SULFATE 1 MG/ML
INJECTION, SOLUTION EPIDURAL; INTRATHECAL; INTRAVENOUS AS NEEDED
Status: DISCONTINUED | OUTPATIENT
Start: 2020-11-25 | End: 2020-11-25 | Stop reason: SURG

## 2020-11-25 RX ORDER — BISACODYL 10 MG
10 SUPPOSITORY, RECTAL RECTAL DAILY PRN
Status: DISCONTINUED | OUTPATIENT
Start: 2020-11-25 | End: 2020-11-30 | Stop reason: HOSPADM

## 2020-11-25 RX ORDER — KETOROLAC TROMETHAMINE 30 MG/ML
30 INJECTION, SOLUTION INTRAMUSCULAR; INTRAVENOUS EVERY 6 HOURS PRN
Status: ACTIVE | OUTPATIENT
Start: 2020-11-25 | End: 2020-11-26

## 2020-11-25 RX ORDER — SODIUM CHLORIDE 0.9 % (FLUSH) 0.9 %
3 SYRINGE (ML) INJECTION EVERY 12 HOURS SCHEDULED
Status: DISCONTINUED | OUTPATIENT
Start: 2020-11-25 | End: 2020-11-26

## 2020-11-25 RX ORDER — ONDANSETRON 2 MG/ML
4 INJECTION INTRAMUSCULAR; INTRAVENOUS ONCE
Status: DISCONTINUED | OUTPATIENT
Start: 2020-11-25 | End: 2020-11-30 | Stop reason: HOSPADM

## 2020-11-25 RX ORDER — OXYTOCIN/0.9 % SODIUM CHLORIDE 30/500 ML
650 PLASTIC BAG, INJECTION (ML) INTRAVENOUS ONCE
Status: DISCONTINUED | OUTPATIENT
Start: 2020-11-25 | End: 2020-11-25

## 2020-11-25 RX ORDER — OXYTOCIN/0.9 % SODIUM CHLORIDE 30/500 ML
PLASTIC BAG, INJECTION (ML) INTRAVENOUS CONTINUOUS PRN
Status: DISCONTINUED | OUTPATIENT
Start: 2020-11-25 | End: 2020-11-25 | Stop reason: SURG

## 2020-11-25 RX ORDER — TRISODIUM CITRATE DIHYDRATE AND CITRIC ACID MONOHYDRATE 500; 334 MG/5ML; MG/5ML
30 SOLUTION ORAL ONCE
Status: DISCONTINUED | OUTPATIENT
Start: 2020-11-25 | End: 2020-11-26

## 2020-11-25 RX ORDER — LIDOCAINE HYDROCHLORIDE 10 MG/ML
5 INJECTION, SOLUTION EPIDURAL; INFILTRATION; INTRACAUDAL; PERINEURAL AS NEEDED
Status: DISCONTINUED | OUTPATIENT
Start: 2020-11-25 | End: 2020-11-26

## 2020-11-25 RX ORDER — KETOROLAC TROMETHAMINE 30 MG/ML
30 INJECTION, SOLUTION INTRAMUSCULAR; INTRAVENOUS EVERY 6 HOURS
Status: DISCONTINUED | OUTPATIENT
Start: 2020-11-25 | End: 2020-11-25

## 2020-11-25 RX ORDER — ONDANSETRON 4 MG/1
4 TABLET, FILM COATED ORAL EVERY 6 HOURS PRN
Status: DISCONTINUED | OUTPATIENT
Start: 2020-11-25 | End: 2020-11-25

## 2020-11-25 RX ADMIN — LABETALOL HYDROCHLORIDE 20 MG: 5 INJECTION, SOLUTION INTRAVENOUS at 18:13

## 2020-11-25 RX ADMIN — Medication 0.12 MG: at 16:09

## 2020-11-25 RX ADMIN — SODIUM CHLORIDE, POTASSIUM CHLORIDE, SODIUM LACTATE AND CALCIUM CHLORIDE 125 ML/HR: 600; 310; 30; 20 INJECTION, SOLUTION INTRAVENOUS at 15:00

## 2020-11-25 RX ADMIN — ONDANSETRON HYDROCHLORIDE 4 MG: 2 INJECTION INTRAMUSCULAR; INTRAVENOUS at 16:05

## 2020-11-25 RX ADMIN — CEFAZOLIN SODIUM 2 G: 1 INJECTION, POWDER, FOR SOLUTION INTRAMUSCULAR; INTRAVENOUS at 16:18

## 2020-11-25 RX ADMIN — SODIUM CHLORIDE, POTASSIUM CHLORIDE, SODIUM LACTATE AND CALCIUM CHLORIDE: 600; 310; 30; 20 INJECTION, SOLUTION INTRAVENOUS at 16:53

## 2020-11-25 RX ADMIN — MAGNESIUM SULFATE HEPTAHYDRATE 2 G/HR: 40 INJECTION, SOLUTION INTRAVENOUS at 20:38

## 2020-11-25 RX ADMIN — LABETALOL HYDROCHLORIDE 20 MG: 5 INJECTION, SOLUTION INTRAVENOUS at 15:34

## 2020-11-25 RX ADMIN — FENTANYL CITRATE 15 MCG: 50 INJECTION, SOLUTION INTRAMUSCULAR; INTRAVENOUS at 16:09

## 2020-11-25 RX ADMIN — BETAMETHASONE SODIUM PHOSPHATE AND BETAMETHASONE ACETATE 12 MG: 3; 3 INJECTION, SUSPENSION INTRA-ARTICULAR; INTRALESIONAL; INTRAMUSCULAR; SOFT TISSUE at 14:43

## 2020-11-25 RX ADMIN — BUPIVACAINE HYDROCHLORIDE 1.8 ML: 7.5 INJECTION, SOLUTION EPIDURAL; RETROBULBAR at 16:09

## 2020-11-25 RX ADMIN — KETOROLAC TROMETHAMINE 30 MG: 30 INJECTION, SOLUTION INTRAMUSCULAR; INTRAVENOUS at 20:45

## 2020-11-25 RX ADMIN — OXYTOCIN-SODIUM CHLORIDE 0.9% IV SOLN 30 UNIT/500ML 650 ML/HR: 30-0.9/5 SOLUTION at 16:29

## 2020-11-25 RX ADMIN — ACETAMINOPHEN 1000 MG: 500 TABLET ORAL at 20:40

## 2020-11-25 RX ADMIN — DIPHENHYDRAMINE HYDROCHLORIDE 25 MG: 50 INJECTION INTRAMUSCULAR; INTRAVENOUS at 19:06

## 2020-11-25 RX ADMIN — SODIUM CITRATE AND CITRIC ACID MONOHYDRATE 30 ML: 500; 334 SOLUTION ORAL at 15:46

## 2020-11-25 NOTE — ANESTHESIA PROCEDURE NOTES
Spinal Block      Patient location during procedure: OR  Indication:at surgeon's request  Performed By  CRNA: Michael Hurtado CRNA  Preanesthetic Checklist  Completed: patient identified, site marked, surgical consent, pre-op evaluation, timeout performed, IV checked, risks and benefits discussed and monitors and equipment checked  Spinal Block Prep:  Patient Position:sitting  Sterile Tech:cap, gloves, gown, mask and sterile barriers  Prep:DuraPrep  Patient Monitoring:blood pressure monitoring, continuous pulse oximetry and EKG  Spinal Block Procedure  Approach:midline  Guidance:landmark technique and palpation technique  Location:L3-L4  Needle Type:Pencan  Needle Gauge:24 G  Placement of Spinal needle event:cerebrospinal fluid aspirated  Paresthesia: no  Fluid Appearance:clear  Medications: bupivacaine PF (MARCAINE) 0.75 % injection, 1.8 mL  Med Administered at 11/25/2020 4:09 PM   Post Assessment  Patient Tolerance:patient tolerated the procedure well with no apparent complications  Complications no

## 2020-11-25 NOTE — ANESTHESIA POSTPROCEDURE EVALUATION
Patient: Vidhi Moody    Procedure Summary     Date: 20 Room / Location: [facility] LABOR DELIVERY  [facility] LABOR DELIVERY    Anesthesia Start:  Anesthesia Stop:     Procedure:  SECTION PRIMARY (Bilateral Abdomen) Diagnosis: (Preeclampsia)    Surgeon: Diego Tate MD Provider: Michael Hurtado CRNA    Anesthesia Type: spinal ASA Status: 2          Anesthesia Type: spinal    Vitals  No vitals data found for the desired time range.          Post Anesthesia Care and Evaluation    Patient location during evaluation: bedside  Patient participation: complete - patient participated  Level of consciousness: awake and alert  Pain management: adequate  Airway patency: patent  Anesthetic complications: No anesthetic complications  PONV Status: none  Cardiovascular status: acceptable  Respiratory status: acceptable  Hydration status: acceptable    Comments: ---------------------            20      ---------------------   BP:     123/79   Pulse:      86       Resp:         16        Temp:                 SpO2:       99         ---------------------

## 2020-11-25 NOTE — ANESTHESIA PREPROCEDURE EVALUATION
Anesthesia Evaluation     NPO Solid Status: > 8 hours  NPO Liquid Status: > 2 hours           Airway   Mallampati: II  TM distance: >3 FB  Neck ROM: full  no difficulty expected  Dental - normal exam     Pulmonary - normal exam   Cardiovascular - normal exam    (+) hypertension,       Neuro/Psych  GI/Hepatic/Renal/Endo      Musculoskeletal     (+) neck pain,   Abdominal    Substance History      OB/GYN    (+) Pregnant, Preeclampsia, pregnancy induced hypertension        Other                        Anesthesia Plan    ASA 2     spinal       Anesthetic plan, all risks, benefits, and alternatives have been provided, discussed and informed consent has been obtained with: patient.

## 2020-11-26 LAB
ALBUMIN SERPL-MCNC: 3 G/DL (ref 3.5–5.2)
ALBUMIN/GLOB SERPL: 1.2 G/DL
ALP SERPL-CCNC: 109 U/L (ref 39–117)
ALT SERPL W P-5'-P-CCNC: 10 U/L (ref 1–33)
ANION GAP SERPL CALCULATED.3IONS-SCNC: 10 MMOL/L (ref 5–15)
AST SERPL-CCNC: 32 U/L (ref 1–32)
BILIRUB SERPL-MCNC: 0.2 MG/DL (ref 0–1.2)
BUN SERPL-MCNC: 7 MG/DL (ref 6–20)
BUN/CREAT SERPL: 11.3 (ref 7–25)
CALCIUM SPEC-SCNC: 8 MG/DL (ref 8.6–10.5)
CHLORIDE SERPL-SCNC: 101 MMOL/L (ref 98–107)
CO2 SERPL-SCNC: 22 MMOL/L (ref 22–29)
CREAT SERPL-MCNC: 0.62 MG/DL (ref 0.57–1)
DEPRECATED RDW RBC AUTO: 43.3 FL (ref 37–54)
ERYTHROCYTE [DISTWIDTH] IN BLOOD BY AUTOMATED COUNT: 14.1 % (ref 12.3–15.4)
GFR SERPL CREATININE-BSD FRML MDRD: 115 ML/MIN/1.73
GLOBULIN UR ELPH-MCNC: 2.5 GM/DL
GLUCOSE SERPL-MCNC: 100 MG/DL (ref 65–99)
HCT VFR BLD AUTO: 32.4 % (ref 34–46.6)
HGB BLD-MCNC: 11.3 G/DL (ref 12–15.9)
MCH RBC QN AUTO: 29.7 PG (ref 26.6–33)
MCHC RBC AUTO-ENTMCNC: 34.9 G/DL (ref 31.5–35.7)
MCV RBC AUTO: 85.3 FL (ref 79–97)
PLATELET # BLD AUTO: 155 10*3/MM3 (ref 140–450)
PMV BLD AUTO: 11.5 FL (ref 6–12)
POTASSIUM SERPL-SCNC: 4.5 MMOL/L (ref 3.5–5.2)
PROT SERPL-MCNC: 5.5 G/DL (ref 6–8.5)
RBC # BLD AUTO: 3.8 10*6/MM3 (ref 3.77–5.28)
SODIUM SERPL-SCNC: 133 MMOL/L (ref 136–145)
WBC # BLD AUTO: 20.45 10*3/MM3 (ref 3.4–10.8)

## 2020-11-26 PROCEDURE — 80053 COMPREHEN METABOLIC PANEL: CPT | Performed by: OBSTETRICS & GYNECOLOGY

## 2020-11-26 PROCEDURE — 0503F POSTPARTUM CARE VISIT: CPT | Performed by: OBSTETRICS & GYNECOLOGY

## 2020-11-26 PROCEDURE — 25010000002 DIPHENHYDRAMINE PER 50 MG: Performed by: OBSTETRICS & GYNECOLOGY

## 2020-11-26 PROCEDURE — 25010000003 MAGNESIUM SULFATE 20 GM/500ML SOLUTION: Performed by: OBSTETRICS & GYNECOLOGY

## 2020-11-26 PROCEDURE — 85027 COMPLETE CBC AUTOMATED: CPT | Performed by: OBSTETRICS & GYNECOLOGY

## 2020-11-26 RX ORDER — SIMETHICONE 80 MG
80 TABLET,CHEWABLE ORAL 4 TIMES DAILY PRN
Status: DISCONTINUED | OUTPATIENT
Start: 2020-11-26 | End: 2020-11-30 | Stop reason: HOSPADM

## 2020-11-26 RX ADMIN — DIPHENHYDRAMINE HYDROCHLORIDE 25 MG: 50 INJECTION INTRAMUSCULAR; INTRAVENOUS at 02:41

## 2020-11-26 RX ADMIN — SIMETHICONE 80 MG: 80 TABLET, CHEWABLE ORAL at 18:14

## 2020-11-26 RX ADMIN — SIMETHICONE 80 MG: 80 TABLET, CHEWABLE ORAL at 11:20

## 2020-11-26 RX ADMIN — ACETAMINOPHEN 1000 MG: 500 TABLET ORAL at 05:12

## 2020-11-26 RX ADMIN — OXYCODONE HYDROCHLORIDE 5 MG: 5 TABLET ORAL at 05:12

## 2020-11-26 RX ADMIN — ACETAMINOPHEN 1000 MG: 500 TABLET ORAL at 15:10

## 2020-11-26 RX ADMIN — ACETAMINOPHEN 1000 MG: 500 TABLET ORAL at 22:10

## 2020-11-26 RX ADMIN — OXYCODONE HYDROCHLORIDE 5 MG: 5 TABLET ORAL at 15:10

## 2020-11-26 RX ADMIN — OXYCODONE HYDROCHLORIDE 5 MG: 5 TABLET ORAL at 09:48

## 2020-11-26 RX ADMIN — OXYCODONE HYDROCHLORIDE 5 MG: 5 TABLET ORAL at 19:21

## 2020-11-26 RX ADMIN — POLYETHYLENE GLYCOL 3350 17 G: 17 POWDER, FOR SOLUTION ORAL at 09:48

## 2020-11-26 RX ADMIN — IBUPROFEN 800 MG: 800 TABLET, FILM COATED ORAL at 21:20

## 2020-11-26 RX ADMIN — MAGNESIUM SULFATE HEPTAHYDRATE 2 G/HR: 40 INJECTION, SOLUTION INTRAVENOUS at 06:58

## 2020-11-26 NOTE — PROGRESS NOTES
.  CC: Prenatal visit    Vidhi Moody is a 25 y.o.  at 34w4d.  Doing well.  Denies contractions, LOF, or VB.  Reports good FM.    BP (!) 152/102   Wt 99.3 kg (219 lb)   LMP 2020 (Exact Date)   BMI 34.30 kg/m²   SVE: Not done  Fundal Height (cm): 38 cm  Fetal Heart Rate: 145/145     Problems (from 20 to present)     Problem Noted Resolved    Pregnancy complicated by fetal cerebral ventriculomegaly 2020 by Diego Tate MD No    Priority:  High      Hx of preeclampsia, prior pregnancy, currently pregnant 2020 by Diego Tate MD No    Priority:  High      Exposure to cytomegalovirus (CMV) 2020 by Diego Tate MD No    Twin gestation in first trimester 2020 by Diego Tate MD No        Sent to labor and delivery for elevated blood pressure  A/P: Vidhi Moody is a 25 y.o.  at 34w4d.  - RTC sent to OB  - Reviewed COVID-19 visitation policy  - Reviewed COVID-19 precautions     Diagnosis Plan   1. Pre-existing essential hypertension during pregnancy in third trimester     2. Exposure to cytomegalovirus (CMV)     3. Pregnancy complicated by fetal cerebral ventriculomegaly, single or unspecified fetus     4. Hx of preeclampsia, prior pregnancy, currently pregnant     5. Dichorionic diamniotic twin pregnancy in first trimester     6. 34 weeks gestation of pregnancy       Diego Tate MD  2020  23:51 CST

## 2020-11-27 PROCEDURE — 0503F POSTPARTUM CARE VISIT: CPT | Performed by: OBSTETRICS & GYNECOLOGY

## 2020-11-27 RX ADMIN — POLYETHYLENE GLYCOL 3350 17 G: 17 POWDER, FOR SOLUTION ORAL at 07:49

## 2020-11-27 RX ADMIN — OXYCODONE HYDROCHLORIDE 5 MG: 5 TABLET ORAL at 07:49

## 2020-11-27 RX ADMIN — IBUPROFEN 800 MG: 800 TABLET, FILM COATED ORAL at 05:02

## 2020-11-27 RX ADMIN — SIMETHICONE 80 MG: 80 TABLET, CHEWABLE ORAL at 15:57

## 2020-11-27 RX ADMIN — IBUPROFEN 800 MG: 800 TABLET, FILM COATED ORAL at 13:58

## 2020-11-27 RX ADMIN — ACETAMINOPHEN 1000 MG: 500 TABLET ORAL at 05:02

## 2020-11-27 RX ADMIN — OXYCODONE HYDROCHLORIDE 5 MG: 5 TABLET ORAL at 18:28

## 2020-11-27 RX ADMIN — ACETAMINOPHEN 1000 MG: 500 TABLET ORAL at 13:58

## 2020-11-27 RX ADMIN — IBUPROFEN 800 MG: 800 TABLET, FILM COATED ORAL at 21:24

## 2020-11-27 RX ADMIN — ACETAMINOPHEN 1000 MG: 500 TABLET ORAL at 21:24

## 2020-11-27 RX ADMIN — OXYCODONE HYDROCHLORIDE 5 MG: 5 TABLET ORAL at 01:45

## 2020-11-28 LAB
ALBUMIN SERPL-MCNC: 3.2 G/DL (ref 3.5–5.2)
ALBUMIN/GLOB SERPL: 1.2 G/DL
ALP SERPL-CCNC: 134 U/L (ref 39–117)
ALT SERPL W P-5'-P-CCNC: 19 U/L (ref 1–33)
ANION GAP SERPL CALCULATED.3IONS-SCNC: 10 MMOL/L (ref 5–15)
AST SERPL-CCNC: 48 U/L (ref 1–32)
BILIRUB SERPL-MCNC: 0.2 MG/DL (ref 0–1.2)
BUN SERPL-MCNC: 12 MG/DL (ref 6–20)
BUN/CREAT SERPL: 18.8 (ref 7–25)
CALCIUM SPEC-SCNC: 9 MG/DL (ref 8.6–10.5)
CHLORIDE SERPL-SCNC: 101 MMOL/L (ref 98–107)
CO2 SERPL-SCNC: 24 MMOL/L (ref 22–29)
CREAT SERPL-MCNC: 0.64 MG/DL (ref 0.57–1)
DEPRECATED RDW RBC AUTO: 44.2 FL (ref 37–54)
ERYTHROCYTE [DISTWIDTH] IN BLOOD BY AUTOMATED COUNT: 13.9 % (ref 12.3–15.4)
GFR SERPL CREATININE-BSD FRML MDRD: 111 ML/MIN/1.73
GLOBULIN UR ELPH-MCNC: 2.7 GM/DL
GLUCOSE SERPL-MCNC: 106 MG/DL (ref 65–99)
HCT VFR BLD AUTO: 33.2 % (ref 34–46.6)
HGB BLD-MCNC: 11.2 G/DL (ref 12–15.9)
MCH RBC QN AUTO: 29.6 PG (ref 26.6–33)
MCHC RBC AUTO-ENTMCNC: 33.7 G/DL (ref 31.5–35.7)
MCV RBC AUTO: 87.8 FL (ref 79–97)
PLATELET # BLD AUTO: 157 10*3/MM3 (ref 140–450)
PMV BLD AUTO: 10.9 FL (ref 6–12)
POTASSIUM SERPL-SCNC: 4 MMOL/L (ref 3.5–5.2)
PROT SERPL-MCNC: 5.9 G/DL (ref 6–8.5)
RBC # BLD AUTO: 3.78 10*6/MM3 (ref 3.77–5.28)
SODIUM SERPL-SCNC: 135 MMOL/L (ref 136–145)
WBC # BLD AUTO: 13.09 10*3/MM3 (ref 3.4–10.8)

## 2020-11-28 PROCEDURE — 0503F POSTPARTUM CARE VISIT: CPT | Performed by: OBSTETRICS & GYNECOLOGY

## 2020-11-28 PROCEDURE — 85027 COMPLETE CBC AUTOMATED: CPT | Performed by: OBSTETRICS & GYNECOLOGY

## 2020-11-28 PROCEDURE — 80053 COMPREHEN METABOLIC PANEL: CPT | Performed by: OBSTETRICS & GYNECOLOGY

## 2020-11-28 RX ORDER — LABETALOL 200 MG/1
200 TABLET, FILM COATED ORAL EVERY 12 HOURS SCHEDULED
Status: DISCONTINUED | OUTPATIENT
Start: 2020-11-28 | End: 2020-11-29

## 2020-11-28 RX ORDER — SODIUM CHLORIDE 0.9 % (FLUSH) 0.9 %
10 SYRINGE (ML) INJECTION EVERY 12 HOURS SCHEDULED
Status: DISCONTINUED | OUTPATIENT
Start: 2020-11-28 | End: 2020-11-30 | Stop reason: HOSPADM

## 2020-11-28 RX ORDER — LABETALOL HYDROCHLORIDE 5 MG/ML
20-80 INJECTION, SOLUTION INTRAVENOUS
Status: ACTIVE | OUTPATIENT
Start: 2020-11-28 | End: 2020-11-29

## 2020-11-28 RX ORDER — LABETALOL HYDROCHLORIDE 5 MG/ML
20 INJECTION, SOLUTION INTRAVENOUS ONCE
Status: COMPLETED | OUTPATIENT
Start: 2020-11-28 | End: 2020-11-28

## 2020-11-28 RX ORDER — SODIUM CHLORIDE 0.9 % (FLUSH) 0.9 %
10 SYRINGE (ML) INJECTION AS NEEDED
Status: DISCONTINUED | OUTPATIENT
Start: 2020-11-28 | End: 2020-11-30 | Stop reason: HOSPADM

## 2020-11-28 RX ADMIN — OXYCODONE HYDROCHLORIDE 5 MG: 5 TABLET ORAL at 15:16

## 2020-11-28 RX ADMIN — Medication 10 ML: at 19:30

## 2020-11-28 RX ADMIN — OXYCODONE HYDROCHLORIDE 5 MG: 5 TABLET ORAL at 02:44

## 2020-11-28 RX ADMIN — ACETAMINOPHEN 1000 MG: 500 TABLET ORAL at 13:53

## 2020-11-28 RX ADMIN — POLYETHYLENE GLYCOL 3350 17 G: 17 POWDER, FOR SOLUTION ORAL at 08:12

## 2020-11-28 RX ADMIN — LABETALOL HYDROCHLORIDE 20 MG: 5 INJECTION, SOLUTION INTRAVENOUS at 19:30

## 2020-11-28 RX ADMIN — ACETAMINOPHEN 1000 MG: 500 TABLET ORAL at 21:46

## 2020-11-28 RX ADMIN — IBUPROFEN 800 MG: 800 TABLET, FILM COATED ORAL at 21:46

## 2020-11-28 RX ADMIN — LABETALOL HYDROCHLORIDE 200 MG: 200 TABLET, FILM COATED ORAL at 18:30

## 2020-11-28 RX ADMIN — ACETAMINOPHEN 1000 MG: 500 TABLET ORAL at 06:08

## 2020-11-28 RX ADMIN — OXYCODONE HYDROCHLORIDE 5 MG: 5 TABLET ORAL at 08:12

## 2020-11-28 RX ADMIN — IBUPROFEN 800 MG: 800 TABLET, FILM COATED ORAL at 06:08

## 2020-11-28 RX ADMIN — IBUPROFEN 800 MG: 800 TABLET, FILM COATED ORAL at 13:53

## 2020-11-29 RX ORDER — LABETALOL 100 MG/1
100 TABLET, FILM COATED ORAL ONCE
Status: DISCONTINUED | OUTPATIENT
Start: 2020-11-29 | End: 2020-11-29

## 2020-11-29 RX ORDER — LABETALOL 300 MG/1
300 TABLET, FILM COATED ORAL EVERY 12 HOURS SCHEDULED
Status: DISCONTINUED | OUTPATIENT
Start: 2020-11-29 | End: 2020-11-29

## 2020-11-29 RX ORDER — LABETALOL 300 MG/1
300 TABLET, FILM COATED ORAL EVERY 12 HOURS SCHEDULED
Status: DISCONTINUED | OUTPATIENT
Start: 2020-11-29 | End: 2020-11-30 | Stop reason: HOSPADM

## 2020-11-29 RX ADMIN — ACETAMINOPHEN 1000 MG: 500 TABLET ORAL at 05:53

## 2020-11-29 RX ADMIN — IBUPROFEN 800 MG: 800 TABLET, FILM COATED ORAL at 05:53

## 2020-11-29 RX ADMIN — LABETALOL HYDROCHLORIDE 300 MG: 300 TABLET, FILM COATED ORAL at 09:30

## 2020-11-29 RX ADMIN — ACETAMINOPHEN 1000 MG: 500 TABLET ORAL at 22:18

## 2020-11-29 RX ADMIN — OXYCODONE HYDROCHLORIDE 5 MG: 5 TABLET ORAL at 02:17

## 2020-11-29 RX ADMIN — LABETALOL HYDROCHLORIDE 20 MG: 5 INJECTION, SOLUTION INTRAVENOUS at 05:54

## 2020-11-29 RX ADMIN — POLYETHYLENE GLYCOL 3350 17 G: 17 POWDER, FOR SOLUTION ORAL at 09:30

## 2020-11-29 RX ADMIN — IBUPROFEN 800 MG: 800 TABLET, FILM COATED ORAL at 22:18

## 2020-11-29 RX ADMIN — OXYCODONE HYDROCHLORIDE 5 MG: 5 TABLET ORAL at 18:02

## 2020-11-29 RX ADMIN — Medication 10 ML: at 09:30

## 2020-11-29 RX ADMIN — ACETAMINOPHEN 1000 MG: 500 TABLET ORAL at 15:12

## 2020-11-29 RX ADMIN — LABETALOL HYDROCHLORIDE 300 MG: 300 TABLET, FILM COATED ORAL at 21:02

## 2020-11-29 RX ADMIN — IBUPROFEN 800 MG: 800 TABLET, FILM COATED ORAL at 15:12

## 2020-11-30 VITALS
SYSTOLIC BLOOD PRESSURE: 142 MMHG | OXYGEN SATURATION: 98 % | DIASTOLIC BLOOD PRESSURE: 91 MMHG | HEART RATE: 89 BPM | RESPIRATION RATE: 16 BRPM | TEMPERATURE: 98.4 F

## 2020-11-30 LAB
ALBUMIN SERPL-MCNC: 3.5 G/DL (ref 3.5–5.2)
ALBUMIN/GLOB SERPL: 1.1 G/DL
ALP SERPL-CCNC: 160 U/L (ref 39–117)
ALT SERPL W P-5'-P-CCNC: 30 U/L (ref 1–33)
ANION GAP SERPL CALCULATED.3IONS-SCNC: 11 MMOL/L (ref 5–15)
AST SERPL-CCNC: 67 U/L (ref 1–32)
BILIRUB SERPL-MCNC: 0.2 MG/DL (ref 0–1.2)
BUN SERPL-MCNC: 14 MG/DL (ref 6–20)
BUN/CREAT SERPL: 21.5 (ref 7–25)
CALCIUM SPEC-SCNC: 9.5 MG/DL (ref 8.6–10.5)
CHLORIDE SERPL-SCNC: 102 MMOL/L (ref 98–107)
CO2 SERPL-SCNC: 23 MMOL/L (ref 22–29)
CREAT SERPL-MCNC: 0.65 MG/DL (ref 0.57–1)
GFR SERPL CREATININE-BSD FRML MDRD: 109 ML/MIN/1.73
GLOBULIN UR ELPH-MCNC: 3.1 GM/DL
GLUCOSE SERPL-MCNC: 103 MG/DL (ref 65–99)
POTASSIUM SERPL-SCNC: 4.9 MMOL/L (ref 3.5–5.2)
PROT SERPL-MCNC: 6.6 G/DL (ref 6–8.5)
SODIUM SERPL-SCNC: 136 MMOL/L (ref 136–145)

## 2020-11-30 PROCEDURE — 80053 COMPREHEN METABOLIC PANEL: CPT | Performed by: STUDENT IN AN ORGANIZED HEALTH CARE EDUCATION/TRAINING PROGRAM

## 2020-11-30 RX ORDER — ACETAMINOPHEN 500 MG
1000 TABLET ORAL EVERY 8 HOURS
Qty: 14 TABLET | Refills: 0 | Status: SHIPPED | OUTPATIENT
Start: 2020-11-30 | End: 2020-12-03

## 2020-11-30 RX ORDER — IBUPROFEN 800 MG/1
800 TABLET ORAL EVERY 8 HOURS SCHEDULED
Qty: 30 TABLET | Refills: 0 | Status: SHIPPED | OUTPATIENT
Start: 2020-11-30 | End: 2021-01-21

## 2020-11-30 RX ORDER — LABETALOL 300 MG/1
300 TABLET, FILM COATED ORAL 2 TIMES DAILY
Qty: 60 TABLET | Refills: 1 | Status: SHIPPED | OUTPATIENT
Start: 2020-11-30

## 2020-11-30 RX ORDER — OXYCODONE HYDROCHLORIDE 5 MG/1
5 TABLET ORAL EVERY 4 HOURS PRN
Qty: 20 TABLET | Refills: 0 | Status: SHIPPED | OUTPATIENT
Start: 2020-11-30 | End: 2021-01-21

## 2020-11-30 RX ADMIN — POLYETHYLENE GLYCOL 3350 17 G: 17 POWDER, FOR SOLUTION ORAL at 09:08

## 2020-11-30 RX ADMIN — IBUPROFEN 800 MG: 800 TABLET, FILM COATED ORAL at 05:57

## 2020-11-30 RX ADMIN — LABETALOL HYDROCHLORIDE 300 MG: 300 TABLET, FILM COATED ORAL at 09:08

## 2020-11-30 RX ADMIN — ACETAMINOPHEN 1000 MG: 500 TABLET ORAL at 05:57

## 2020-12-01 NOTE — PAYOR COMM NOTE
"Mary Rodriguez  [facility]  P: [phone]  F: [phone]    auth#[id number]    Vidhi Moody (25 y.o. Female)     Date of Birth Social Security Number Address Home Phone MRN    1995  [address]  [facility] [phone] [id number]    Gnosticism Marital Status          None        Admission Date Admission Type Admitting Provider Attending Provider Department, Room/Bed    20 Elective Diego Tate MD  [facility] MOTHER BABY, M755/1    Discharge Date Discharge Disposition Discharge Destination        2020 Home or Self Care              Attending Provider: (none)   Allergies: Adhesive Tape, Dilaudid [Hydromorphone]    Isolation: None   Infection: None   Code Status: Prior    Ht: 170.2 cm (67\")   Wt: 99.3 kg (219 lb)    Admission Cmt: None   Principal Problem: None                Active Insurance as of 2020     Primary Coverage     Payor Plan Insurance Group Employer/Plan Group    [employer] PPO [id number]     Payor Plan Address Payor Plan Phone Number Payor Plan Fax Number Effective Dates    [address] [phone]  2018 - None Entered    [place] [address]       Subscriber Name Subscriber Birth Date Member ID       VIDHI MOODY 1995 [id number]                 Emergency Contacts      (Rel.) Home Phone Work Phone Mobile Phone    Hattie Moody (Spouse) [phone] -- [phone]               Discharge Summary      Diego Tate MD at 20 1151          [facility] - Discharge Summary from  Section     Patient Name: Vidhi Moody  MRN: [id number]  : 1995  Admit Date: 2020  Discharge Date: 2020  Admitting Physician: Diego Tate MD  Discharge Physician: Aidan Elkins MD     Admission/Discharge diagnoses:  Active Hospital Problems    Diagnosis  POA   • Severe preeclampsia [O14.10]  Yes "   • History of  section [Z98.891]  Not Applicable      Resolved Hospital Problems   No resolved problems to display.        Hospital Course:  Vidhi Moody is a  who was admitted on 2020 for , trasverse. Twins successfully delivered. History of pre-eclampsia.  During post-partum stage, patient experienced elevated BP's, requiring IV anti-hypertensive medications.  However, by  POD#5, patient's BP's were successfully controlled with PO labetalol.  LFT's were within acceptable limits post-partum.     Procedures Performed:      Michaela Moody [[id number]]   , Low Transverse      Bakari Moody [[id number]]   , Low Transverse   . Twins, non-vertex     History:  The patient had a      Michaela Moody [[id number]]   , Low Transverse      Bakari Moody [[id number]]   , Low Transverse    on      Michaela Moody [[id number]]   2020      Bakari Moody [[id number]]   2020   . She delivered a      MoodyMichaela lemus [[id number]]   Female      Bakari Moody [[id number]]   Male    infant that weighed      Michaela Moody [[id number]]   4 lb 15 oz (2240 g)      Bakari Moody [[id number]]   4 lb 14.3 oz (2220 g)   . Apgars were      Michaela Moody [[id number]]   5      Bakari Moody [[id number]]   6    and      Michaela Moody [[id number]]   6      Bakari Moody [[id number]]   7    at one and five minutes, respectively. Blood loss was      Michaela Moody [[id number]]         Bakari Moody [[id number]]       mL.  Amniotic Fluid was      Michaela Moody [[id number]]   Normal      Bakari Moody [[id number]]   Normal   . Fetal presentation was      Michaela Moody [[id number]]   Breech      Bakari Moody [[id number]]   Vertex    . Anesthesia was      Michaela Moody  [[id number]]   Spinal      Bakari Moody [[id number]]   Spinal   .     Subjective:  The patient feels well. Pain is well controlled with current medications. The babies are stable. The patient is ambulating well. The patient is tolerating a normal diet.  Lochia lessening. Flatus has been passed. Birth control plan:  plans for vasectomy.  Patient is weighing Interim BC options.  Feeding method: Formula feed.     Objective:  Vitals:    11/30/20 0907   BP: 142/91   Pulse: 89   Resp: 16   Temp: 98.4 °F (36.9 °C)   SpO2: 98%      General: alert, well appearing, in no apparent distress  CVS: RRR  RESP: CTAB, normal effort  ABD: soft, normal bowel sounds  Uterine Fundus: firm  Incision: no significant drainage, no dehiscence, no significant erythema  Dressing: mesh with binder, dry  Ext: good ROM and strength, trace edema     Labs at Discharge:  Lab Results   Component Value Date    WBC 13.09 (H) 11/28/2020    HGB 11.2 (L) 11/28/2020    HCT 33.2 (L) 11/28/2020    MCV 87.8 11/28/2020     11/28/2020       Disposition: Home  Discharge condition: stable     Discharge Diet:   Diet Instructions     Diet: Regular      Discharge Diet: Regular        Activity:  Activity Instructions     Activity as Tolerated      Bathing Restrictions      No tub bath for 6 weeks.  Do not submerge.    Type of Restriction: Bathing    Bathing Restrictions: No Tub Bath    Pelvic Rest      No tub bathing, submersion, or sex for 6 weeks.    Sexual Activity Restrictions      Type of Restriction: Sex    Explain Sexual Activity Restrictions: no sex for 6 weeks          Call MD if you experience heavy vaginal bleeding, frequent passage of clots, foul odor of lochia, increased pain, fever > 100.4F or any other concerns.    Discharge Meds:     Your medication list      START taking these medications      Instructions Last Dose Given Next Dose Due   ibuprofen 800 MG tablet  Commonly known as: ADVIL,MOTRIN      Take 1 tablet by mouth  Every 8 (Eight) Hours.       labetalol 300 MG tablet  Commonly known as: NORMODYNE      Take 1 tablet by mouth 2 (Two) Times a Day.       oxyCODONE 5 MG immediate release tablet  Commonly known as: ROXICODONE      Take 1 tablet by mouth Every 4 (Four) Hours As Needed for Moderate Pain  for up to 20 doses.          CHANGE how you take these medications      Instructions Last Dose Given Next Dose Due   acetaminophen 500 MG tablet  Commonly known as: TYLENOL  What changed:   · how much to take  · when to take this  · reasons to take this      Take 2 tablets by mouth Every 8 (Eight) Hours for 7 doses.          CONTINUE taking these medications      Instructions Last Dose Given Next Dose Due   aspirin 81 MG chewable tablet      Chew 1 tablet Daily.       melatonin 5 MG tablet tablet      Take 10 mg by mouth Every Night.       ondansetron ODT 4 MG disintegrating tablet  Commonly known as: Zofran ODT      Place 1 tablet on the tongue Every 8 (Eight) Hours As Needed for Nausea or Vomiting.          STOP taking these medications    prenatal (CLASSIC) vitamin  tablet  Generic drug: prenatal vitamin        promethazine 12.5 MG tablet  Commonly known as: PHENERGAN              Where to Get Your Medications      These medications were sent to [facility] [address] [place] [address] [place] WRIGHT AT [facility] - [phone]  - [phone] [address] [place] , [facility] [address]    Phone: [phone]   · acetaminophen 500 MG tablet  · ibuprofen 800 MG tablet  · labetalol 300 MG tablet     You can get these medications from any pharmacy    Bring a paper prescription for each of these medications  · oxyCODONE 5 MG immediate release tablet         Follow Up:  Dr. Diego Tate in 1 week    Signature    This document has been electronically signed by Aidan Elkins MD on November 30, 2020 11:54 CST   I have seen and evaluated the patient.  I have discussed the case with the resident. I have reviewed the  notes, assessment and plan, and/or procedures performed by the resident. I concur with the resident’s documentation.        This document has been electronically signed by Diego Tate MD on November 30, 2020 13:08 CST      Electronically signed by Diego Tate MD at 11/30/20 1308       Discharge Order (From admission, onward)     Start     Ordered    11/30/20 1150  Discharge patient  Once     Expected Discharge Date: 11/30/20    Discharge Disposition: Home or Self Care    Physician of Record for Attribution - Please select from Treatment Team: VALERIO SMYTH [[id number]]    Review needed by CMO to determine Physician of Record: No       Question Answer Comment   Physician of Record for Attribution - Please select from Treatment Team VALERIO SMYTH    Review needed by CMO to determine Physician of Record No        11/30/20 1157

## 2020-12-02 LAB
LAB AP CASE REPORT: NORMAL
PATH REPORT.FINAL DX SPEC: NORMAL

## 2020-12-03 ENCOUNTER — OFFICE VISIT (OUTPATIENT)
Dept: OBSTETRICS AND GYNECOLOGY | Facility: CLINIC | Age: 25
End: 2020-12-03

## 2020-12-03 VITALS
SYSTOLIC BLOOD PRESSURE: 128 MMHG | DIASTOLIC BLOOD PRESSURE: 74 MMHG | HEIGHT: 67 IN | WEIGHT: 186.2 LBS | BODY MASS INDEX: 29.22 KG/M2

## 2020-12-03 DIAGNOSIS — Z98.890 POSTOPERATIVE STATE: Primary | ICD-10-CM

## 2020-12-03 PROCEDURE — 99024 POSTOP FOLLOW-UP VISIT: CPT | Performed by: OBSTETRICS & GYNECOLOGY

## 2020-12-04 ENCOUNTER — RESULTS ENCOUNTER (OUTPATIENT)
Dept: OBSTETRICS AND GYNECOLOGY | Facility: CLINIC | Age: 25
End: 2020-12-04

## 2020-12-04 DIAGNOSIS — O35.09X0 PREGNANCY COMPLICATED BY FETAL CEREBRAL VENTRICULOMEGALY, SINGLE OR UNSPECIFIED FETUS: ICD-10-CM

## 2020-12-04 DIAGNOSIS — O30.041 DICHORIONIC DIAMNIOTIC TWIN PREGNANCY IN FIRST TRIMESTER: ICD-10-CM

## 2020-12-04 DIAGNOSIS — O09.299 HX OF PREECLAMPSIA, PRIOR PREGNANCY, CURRENTLY PREGNANT: ICD-10-CM

## 2020-12-04 NOTE — PROGRESS NOTES
Vidhi Moody is a 25 y.o. y/o female.     Chief Complaint: Postpartum postop follow-up.       HPI:   25 y.o. .  Patient's last menstrual period was 2020 (exact date)..  Patient is postop doing well no complaints normal bowel and bladder          Review of Systems     Constitutional: Denies night sweats    HENT: No hearing changes, denies ear pain    Eye: No eye pain; no foreign body in eye    Pulmonary: No hemoptysis    Cardiovascular: No claudication    GI: No hematemesis    Musculoskeletal: No arthralgias, no joint swelling    Endocrine: No polydipsia or polyuria    Hematologic: Denies any free bleeding    Psychiatric: Denies any delusions    The following portions of the patient's history were reviewed and updated as appropriate: allergies, current medications, past family history, past medical history, past social history, past surgical history and problem list.    Allergies   Allergen Reactions   • Adhesive Tape Hives   • Dilaudid [Hydromorphone] Irritability        Outpatient Medications Prior to Visit   Medication Sig Dispense Refill   • acetaminophen (TYLENOL) 500 MG tablet Take 2 tablets by mouth Every 8 (Eight) Hours for 7 doses. 14 tablet 0   • ibuprofen (ADVIL,MOTRIN) 800 MG tablet Take 1 tablet by mouth Every 8 (Eight) Hours. 30 tablet 0   • labetalol (NORMODYNE) 300 MG tablet Take 1 tablet by mouth 2 (Two) Times a Day. 60 tablet 1   • melatonin 5 MG tablet tablet Take 10 mg by mouth Every Night.     • oxyCODONE (ROXICODONE) 5 MG immediate release tablet Take 1 tablet by mouth Every 4 (Four) Hours As Needed for Moderate Pain  for up to 20 doses. 20 tablet 0   • aspirin 81 MG chewable tablet Chew 1 tablet Daily. 30 tablet 12   • ondansetron ODT (Zofran ODT) 4 MG disintegrating tablet Place 1 tablet on the tongue Every 8 (Eight) Hours As Needed for Nausea or Vomiting. 30 tablet 2     No facility-administered medications prior to visit.         The patient has a family history of    Family History   Problem Relation Age of Onset   • Hypertension Mother    • Hyperlipidemia Mother    • Thyroid nodules Mother    • No Known Problems Father    • No Known Problems Maternal Grandmother    • Hypertension Maternal Grandfather    • Hyperlipidemia Maternal Grandfather    • Heart disease Maternal Grandfather    • Breast cancer Paternal Grandmother 50   • No Known Problems Paternal Grandfather    • Colon cancer Paternal Uncle 50   • Diabetes Paternal Aunt         unsure which type or age of onset   • No Known Problems Daughter         Past Medical History:   Diagnosis Date   • Abnormal Pap smear of cervix    • Enlarged tonsils    • Miscarriage    • Ovarian cyst    • Preeclampsia    • Sore throat, chronic    • Varicella         OB History        4    Para   2    Term   1       1    AB   2    Living   3       SAB   0    TAB   0    Ectopic   0    Molar   0    Multiple   1    Live Births   3                 Social History     Socioeconomic History   • Marital status:      Spouse name: Not on file   • Number of children: Not on file   • Years of education: Not on file   • Highest education level: Not on file   Tobacco Use   • Smoking status: Former Smoker   • Smokeless tobacco: Never Used   Substance and Sexual Activity   • Alcohol use: Not Currently     Comment: occassional   • Drug use: No   • Sexual activity: Yes     Partners: Male     Birth control/protection: Condom     Comment: last pap smear 2019 negative         Past Surgical History:   Procedure Laterality Date   •  SECTION Bilateral 2020    Procedure:  SECTION PRIMARY;  Surgeon: Diego Tate MD;  Location: [facility] LABOR DELIVERY;  Service: Obstetrics/Gynecology;  Laterality: Bilateral;   • DILATATION AND CURETTAGE     • SHOULDER SURGERY Right    • TONSILLECTOMY     • TONSILLECTOMY AND ADENOIDECTOMY N/A 2018    Procedure: TONSILLECTOMY AND NASOPHARYNGOSCOPY   (PLEASE CALL AKUA,);  Surgeon: Severiano  "FREEDOM Marshall MD;  Location: [facility];  Service: ENT   • WISDOM TOOTH EXTRACTION          Patient Active Problem List   Diagnosis   • PCOS (polycystic ovarian syndrome)   • Menorrhagia with regular cycle   • BMI 30.0-30.9,adult   • Atypical squamous cells of undetermined significance (ASCUS) on Papanicolaou smear of cervix   • Oral contraceptive pill surveillance   • Twin gestation in first trimester   • Pregnancy confirmed by positive urine test   • Hx of preeclampsia, prior pregnancy, currently pregnant   • Pregnancy   • Cramping affecting pregnancy, antepartum   • Nausea and vomiting during pregnancy   •  screening for malformation using ultrasonics   • Encounter for other  screening follow-up   • Nausea and vomiting   • Pregnancy complicated by fetal cerebral ventriculomegaly   •  uterine contractions, antepartum   • Exposure to cytomegalovirus (CMV)   • Elevated blood pressure affecting pregnancy in second trimester, antepartum   • Poor fetal growth affecting management of mother in third trimester   • Severe preeclampsia   • History of  section   • Pre-existing essential hypertension during pregnancy in third trimester        Documented Vitals    20 1123   BP: 128/74   Weight: 84.5 kg (186 lb 3.2 oz)   Height: 170.2 cm (67\")   PainSc: 0-No pain        Body mass index is 29.16 kg/m².    Physical Exam  Constitutional: Appears to be in no acute distress; Eyes: sclera normal; Endocrine system: thyroid palpate is normal; Pulmonary system: lungs clear; Cardiovascular system: heart regular rate and rhythm; Gastrointestinal system: abdomen soft nontender, active bowel sounds; Urologic system: CVA negative; Psychiatric: appropriate insight; Neurologic: gait within normal limits incision appears to be    Laboratory Data:   Lab Results - Last 18 Months   Lab Units 20  0920 20  19520  0725 20  1425 10/01/20  1416   GLUCOSE mg/dL 103* 106* 100* 74 113*   BUN " mg/dL 14 12 7 6 5*   CREATININE mg/dL 0.65 0.64 0.62 0.62 0.51*   SODIUM mmol/L 136 135* 133* 137 136   POTASSIUM mmol/L 4.9 4.0 4.5 4.0 3.2*   CHLORIDE mmol/L 102 101 101 105 104   CO2 mmol/L 23.0 24.0 22.0 20.0* 21.0*   CALCIUM mg/dL 9.5 9.0 8.0* 9.3 8.9   TOTAL PROTEIN g/dL 6.6 5.9* 5.5* 6.3 6.1   ALBUMIN g/dL 3.50 3.20* 3.00* 3.40* 3.50   ALT (SGPT) U/L 30 19 10 13 6   AST (SGOT) U/L 67* 48* 32 33* 14   ALK PHOS U/L 160* 134* 109 131* 94   BILIRUBIN mg/dL 0.2 0.2 0.2 0.4 0.2   EGFR IF NONAFRICN AM mL/min/1.73 109 111 115 115 145   GLOBULIN gm/dL 3.1 2.7 2.5 2.9 2.6   A/G RATIO g/dL 1.1 1.2 1.2 1.2 1.3   BUN / CREAT RATIO  21.5 18.8 11.3 9.7 9.8   ANION GAP mmol/L 11.0 10.0 10.0 12.0 11.0     Lab Results - Last 18 Months   Lab Units 11/28/20 1951 11/26/20  0725 11/25/20  1425 10/01/20  1416 08/19/20  0844   WBC 10*3/mm3 13.09* 20.45* 15.49* 11.21* 10.35   RBC 10*6/mm3 3.78 3.80 4.36 3.79 4.00   HEMOGLOBIN g/dL 11.2* 11.3* 12.8 11.3* 11.8*   HEMATOCRIT % 33.2* 32.4* 36.8 31.8* 33.3*   MCV fL 87.8 85.3 84.4 83.9 83.3   MCH pg 29.6 29.7 29.4 29.8 29.5   MCHC g/dL 33.7 34.9 34.8 35.5 35.4   RDW % 13.9 14.1 14.0 14.1 13.6   RDW-SD fl 44.2 43.3 43.0 42.7 41.4   MPV fL 10.9 11.5 11.7 10.4 10.9   PLATELETS 10*3/mm3 157 155 141 157 182     No results for input(s): HCGQUAL in the last 20841 hours.    Assessment       No diagnosis found.      Plan       No orders of the defined types were placed in this encounter.            This document has been electronically signed by Diego Tate MD on December 4, 2020 09:28 CST    Please note that portions of this note were completed with a voice recognition program.

## 2020-12-18 ENCOUNTER — RESULTS ENCOUNTER (OUTPATIENT)
Dept: OBSTETRICS AND GYNECOLOGY | Facility: CLINIC | Age: 25
End: 2020-12-18

## 2020-12-18 DIAGNOSIS — O35.09X0 PREGNANCY COMPLICATED BY FETAL CEREBRAL VENTRICULOMEGALY, SINGLE OR UNSPECIFIED FETUS: ICD-10-CM

## 2020-12-18 DIAGNOSIS — O09.299 HX OF PREECLAMPSIA, PRIOR PREGNANCY, CURRENTLY PREGNANT: ICD-10-CM

## 2020-12-18 DIAGNOSIS — O30.041 DICHORIONIC DIAMNIOTIC TWIN PREGNANCY IN FIRST TRIMESTER: ICD-10-CM

## 2020-12-23 ENCOUNTER — OFFICE VISIT (OUTPATIENT)
Dept: OBSTETRICS AND GYNECOLOGY | Facility: CLINIC | Age: 25
End: 2020-12-23

## 2020-12-23 DIAGNOSIS — Z98.890 POSTOPERATIVE STATE: Primary | ICD-10-CM

## 2020-12-23 PROCEDURE — 0503F POSTPARTUM CARE VISIT: CPT | Performed by: OBSTETRICS & GYNECOLOGY

## 2020-12-25 PROBLEM — Z98.890 POSTOPERATIVE STATE: Status: ACTIVE | Noted: 2020-12-25

## 2020-12-25 NOTE — PROGRESS NOTES
You have chosen to receive care through a telephone visit. Do you consent to use a telephone visit for your medical care today? Yes  This visit has been rescheduled as a phone visit to comply with patient safety concerns in accordance with CDC recommendations. Total time of discussion was 9 minutes.        Chief complaint postpartum  delivery twins    Patient is doing well no complaints incisions healing well she says.  Discussed contraceptive options.

## 2020-12-28 ENCOUNTER — TELEPHONE (OUTPATIENT)
Dept: OBSTETRICS AND GYNECOLOGY | Facility: CLINIC | Age: 25
End: 2020-12-28

## 2020-12-28 RX ORDER — NORGESTIMATE AND ETHINYL ESTRADIOL 7DAYSX3 LO
1 KIT ORAL DAILY
Qty: 28 TABLET | Refills: 12 | Status: SHIPPED | OUTPATIENT
Start: 2020-12-28 | End: 2022-04-28

## 2020-12-28 NOTE — TELEPHONE ENCOUNTER
Forgot to ask about birth control at her last appointment but would like birth control sent to [facility] pharmacy if possible. Is not breastfeeding

## 2020-12-28 NOTE — TELEPHONE ENCOUNTER
Wants to start oral contraceptives but she had had hypertension in pregnancy.  She is on labetalol.  She is taking her blood pressure says in normotensive range at this point.  I am going to call her in [facility] low and she is going to take her blood pressure twice a day and record and bring in with her an appointment in 3 weeks and we will see if hopefully we can discontinue labetalol

## 2021-01-21 ENCOUNTER — POSTPARTUM VISIT (OUTPATIENT)
Dept: OBSTETRICS AND GYNECOLOGY | Facility: CLINIC | Age: 26
End: 2021-01-21

## 2021-01-21 VITALS
DIASTOLIC BLOOD PRESSURE: 70 MMHG | WEIGHT: 189.2 LBS | HEIGHT: 67 IN | BODY MASS INDEX: 29.7 KG/M2 | SYSTOLIC BLOOD PRESSURE: 118 MMHG

## 2021-01-21 DIAGNOSIS — Z98.890 POSTOPERATIVE STATE: Primary | ICD-10-CM

## 2021-01-21 PROCEDURE — 0503F POSTPARTUM CARE VISIT: CPT | Performed by: OBSTETRICS & GYNECOLOGY

## 2021-01-22 NOTE — PROGRESS NOTES
Vidhi Moody is a 25 y.o. y/o female.     Chief Complaint: Postop     HPI:   25 y.o. .  No LMP recorded..  Patient is postop doing well no complaints          Review of Systems     Constitutional: Denies night sweats    HENT: No hearing changes, denies ear pain    Eye: No eye pain; no foreign body in eye    Pulmonary: No hemoptysis    Cardiovascular: No claudication    GI: No hematemesis    Musculoskeletal: No arthralgias, no joint swelling    Endocrine: No polydipsia or polyuria    Hematologic: Denies any free bleeding    Psychiatric: Denies any delusions    The following portions of the patient's history were reviewed and updated as appropriate: allergies, current medications, past family history, past medical history, past social history, past surgical history and problem list.    Allergies   Allergen Reactions   • Adhesive Tape Hives   • Dilaudid [Hydromorphone] Irritability        Outpatient Medications Prior to Visit   Medication Sig Dispense Refill   • labetalol (NORMODYNE) 300 MG tablet Take 1 tablet by mouth 2 (Two) Times a Day. 60 tablet 1   • norgestimate-ethinyl estradiol (Ortho Tri-Cyclen Lo) 0.18/0.215/0.25 MG-25 MCG per tablet Take 1 tablet by mouth Daily. 28 tablet 12   • aspirin 81 MG chewable tablet Chew 1 tablet Daily. 30 tablet 12   • ibuprofen (ADVIL,MOTRIN) 800 MG tablet Take 1 tablet by mouth Every 8 (Eight) Hours. 30 tablet 0   • melatonin 5 MG tablet tablet Take 10 mg by mouth Every Night.     • ondansetron ODT (Zofran ODT) 4 MG disintegrating tablet Place 1 tablet on the tongue Every 8 (Eight) Hours As Needed for Nausea or Vomiting. 30 tablet 2   • oxyCODONE (ROXICODONE) 5 MG immediate release tablet Take 1 tablet by mouth Every 4 (Four) Hours As Needed for Moderate Pain  for up to 20 doses. 20 tablet 0     No facility-administered medications prior to visit.         The patient has a family history of   Family History   Problem Relation Age of Onset   •  Hypertension Mother    • Hyperlipidemia Mother    • Thyroid nodules Mother    • No Known Problems Father    • No Known Problems Maternal Grandmother    • Hypertension Maternal Grandfather    • Hyperlipidemia Maternal Grandfather    • Heart disease Maternal Grandfather    • Breast cancer Paternal Grandmother 50   • No Known Problems Paternal Grandfather    • Colon cancer Paternal Uncle 50   • Diabetes Paternal Aunt         unsure which type or age of onset   • No Known Problems Daughter         Past Medical History:   Diagnosis Date   • Abnormal Pap smear of cervix    • Enlarged tonsils    • Miscarriage    • Ovarian cyst    • Preeclampsia    • Sore throat, chronic    • Varicella         OB History        4    Para   2    Term   1       1    AB   2    Living   3       SAB   0    TAB   0    Ectopic   0    Molar   0    Multiple   1    Live Births   3                 Social History     Socioeconomic History   • Marital status:      Spouse name: Not on file   • Number of children: Not on file   • Years of education: Not on file   • Highest education level: Not on file   Tobacco Use   • Smoking status: Former Smoker   • Smokeless tobacco: Never Used   Substance and Sexual Activity   • Alcohol use: Not Currently     Comment: occassional   • Drug use: No   • Sexual activity: Yes     Partners: Male     Birth control/protection: Condom     Comment: last pap smear 2019 negative         Past Surgical History:   Procedure Laterality Date   •  SECTION Bilateral 2020    Procedure:  SECTION PRIMARY;  Surgeon: Diego Tate MD;  Location: [facility] LABOR DELIVERY;  Service: Obstetrics/Gynecology;  Laterality: Bilateral;   • DILATATION AND CURETTAGE     • SHOULDER SURGERY Right    • TONSILLECTOMY     • TONSILLECTOMY AND ADENOIDECTOMY N/A 2018    Procedure: TONSILLECTOMY AND NASOPHARYNGOSCOPY   (PLEASE CALL PAT,);  Surgeon: Severiano Marshall MD;  Location: [facility] OR;  Service: ENT   •  "WISDOM TOOTH EXTRACTION          Patient Active Problem List   Diagnosis   • PCOS (polycystic ovarian syndrome)   • Menorrhagia with regular cycle   • BMI 30.0-30.9,adult   • Atypical squamous cells of undetermined significance (ASCUS) on Papanicolaou smear of cervix   • Oral contraceptive pill surveillance   • Twin gestation in first trimester   • Pregnancy confirmed by positive urine test   • Hx of preeclampsia, prior pregnancy, currently pregnant   • Pregnancy   • Cramping affecting pregnancy, antepartum   • Nausea and vomiting during pregnancy   •  screening for malformation using ultrasonics   • Encounter for other  screening follow-up   • Nausea and vomiting   • Pregnancy complicated by fetal cerebral ventriculomegaly   •  uterine contractions, antepartum   • Exposure to cytomegalovirus (CMV)   • Elevated blood pressure affecting pregnancy in second trimester, antepartum   • Poor fetal growth affecting management of mother in third trimester   • Severe preeclampsia   • History of  section   • Pre-existing essential hypertension during pregnancy in third trimester   • Postoperative state   • Postpartum state        Documented Vitals    21 1547   BP: 118/70   Weight: 85.8 kg (189 lb 3.2 oz)   Height: 170.2 cm (67\")   PainSc: 0-No pain        Body mass index is 29.63 kg/m².    Physical Exam  Constitutional: Appears to be in no acute distress; Eyes: sclera normal; Endocrine system: thyroid palpate is normal; Pulmonary system: lungs clear; Cardiovascular system: heart regular rate and rhythm; Gastrointestinal system: abdomen soft nontender, active bowel sounds; Urologic system: CVA negative; Psychiatric: appropriate insight; Neurologic: gait within normal limits incision healing well    Laboratory Data:   Lab Results - Last 18 Months   Lab Units 20  0920 20  1951 20  0725 20  1425 10/01/20  1416   GLUCOSE mg/dL 103* 106* 100* 74 113*   BUN mg/dL 14 12 7 " 6 5*   CREATININE mg/dL 0.65 0.64 0.62 0.62 0.51*   SODIUM mmol/L 136 135* 133* 137 136   POTASSIUM mmol/L 4.9 4.0 4.5 4.0 3.2*   CHLORIDE mmol/L 102 101 101 105 104   CO2 mmol/L 23.0 24.0 22.0 20.0* 21.0*   CALCIUM mg/dL 9.5 9.0 8.0* 9.3 8.9   TOTAL PROTEIN g/dL 6.6 5.9* 5.5* 6.3 6.1   ALBUMIN g/dL 3.50 3.20* 3.00* 3.40* 3.50   ALT (SGPT) U/L 30 19 10 13 6   AST (SGOT) U/L 67* 48* 32 33* 14   ALK PHOS U/L 160* 134* 109 131* 94   BILIRUBIN mg/dL 0.2 0.2 0.2 0.4 0.2   EGFR IF NONAFRICN AM mL/min/1.73 109 111 115 115 145   GLOBULIN gm/dL 3.1 2.7 2.5 2.9 2.6   A/G RATIO g/dL 1.1 1.2 1.2 1.2 1.3   BUN / CREAT RATIO  21.5 18.8 11.3 9.7 9.8   ANION GAP mmol/L 11.0 10.0 10.0 12.0 11.0     Lab Results - Last 18 Months   Lab Units 11/28/20 1951 11/26/20  0725 11/25/20  1425 10/01/20  1416 08/19/20  0844   WBC 10*3/mm3 13.09* 20.45* 15.49* 11.21* 10.35   RBC 10*6/mm3 3.78 3.80 4.36 3.79 4.00   HEMOGLOBIN g/dL 11.2* 11.3* 12.8 11.3* 11.8*   HEMATOCRIT % 33.2* 32.4* 36.8 31.8* 33.3*   MCV fL 87.8 85.3 84.4 83.9 83.3   MCH pg 29.6 29.7 29.4 29.8 29.5   MCHC g/dL 33.7 34.9 34.8 35.5 35.4   RDW % 13.9 14.1 14.0 14.1 13.6   RDW-SD fl 44.2 43.3 43.0 42.7 41.4   MPV fL 10.9 11.5 11.7 10.4 10.9   PLATELETS 10*3/mm3 157 155 141 157 182     No results for input(s): HCGQUAL in the last 95554 hours.    Assessment        Diagnosis Plan   1. Postoperative state   released C.         Plan       No orders of the defined types were placed in this encounter.            This document has been electronically signed by Diego Tate MD on January 21, 2021 18:28 CST    Please note that portions of this note were completed with a voice recognition program.

## 2021-04-05 ENCOUNTER — TELEPHONE (OUTPATIENT)
Dept: OBSTETRICS AND GYNECOLOGY | Facility: CLINIC | Age: 26
End: 2021-04-05

## 2021-04-06 ENCOUNTER — TELEPHONE (OUTPATIENT)
Dept: OBSTETRICS AND GYNECOLOGY | Facility: CLINIC | Age: 26
End: 2021-04-06

## 2021-04-06 DIAGNOSIS — R53.83 OTHER FATIGUE: Primary | ICD-10-CM

## 2021-04-06 NOTE — TELEPHONE ENCOUNTER
Patient with some lethargy symptoms.  Will send in laboratory work is does not have a PCP at this point strongly encouraged to get 1

## 2021-04-09 ENCOUNTER — LAB (OUTPATIENT)
Dept: LAB | Facility: HOSPITAL | Age: 26
End: 2021-04-09

## 2021-04-09 DIAGNOSIS — R53.83 OTHER FATIGUE: ICD-10-CM

## 2021-04-09 LAB
ALBUMIN SERPL-MCNC: 4.6 G/DL (ref 3.5–5.2)
ALBUMIN/GLOB SERPL: 1.8 G/DL
ALP SERPL-CCNC: 90 U/L (ref 39–117)
ALT SERPL W P-5'-P-CCNC: 17 U/L (ref 1–33)
ANION GAP SERPL CALCULATED.3IONS-SCNC: 13.3 MMOL/L (ref 5–15)
AST SERPL-CCNC: 22 U/L (ref 1–32)
BILIRUB SERPL-MCNC: 0.3 MG/DL (ref 0–1.2)
BUN SERPL-MCNC: 8 MG/DL (ref 6–20)
BUN/CREAT SERPL: 10.7 (ref 7–25)
CALCIUM SPEC-SCNC: 9.6 MG/DL (ref 8.6–10.5)
CHLORIDE SERPL-SCNC: 104 MMOL/L (ref 98–107)
CO2 SERPL-SCNC: 21.7 MMOL/L (ref 22–29)
CREAT SERPL-MCNC: 0.75 MG/DL (ref 0.57–1)
DEPRECATED RDW RBC AUTO: 39.1 FL (ref 37–54)
ERYTHROCYTE [DISTWIDTH] IN BLOOD BY AUTOMATED COUNT: 13.7 % (ref 12.3–15.4)
FERRITIN SERPL-MCNC: 22.5 NG/ML (ref 13–150)
GFR SERPL CREATININE-BSD FRML MDRD: 92 ML/MIN/1.73
GLOBULIN UR ELPH-MCNC: 2.5 GM/DL
GLUCOSE SERPL-MCNC: 97 MG/DL (ref 65–99)
HCT VFR BLD AUTO: 39.3 % (ref 34–46.6)
HGB BLD-MCNC: 14 G/DL (ref 12–15.9)
MCH RBC QN AUTO: 28.6 PG (ref 26.6–33)
MCHC RBC AUTO-ENTMCNC: 35.6 G/DL (ref 31.5–35.7)
MCV RBC AUTO: 80.2 FL (ref 79–97)
PLATELET # BLD AUTO: 227 10*3/MM3 (ref 140–450)
PMV BLD AUTO: 10.7 FL (ref 6–12)
POTASSIUM SERPL-SCNC: 3.9 MMOL/L (ref 3.5–5.2)
PROT SERPL-MCNC: 7.1 G/DL (ref 6–8.5)
RBC # BLD AUTO: 4.9 10*6/MM3 (ref 3.77–5.28)
SODIUM SERPL-SCNC: 139 MMOL/L (ref 136–145)
T4 FREE SERPL-MCNC: 1.2 NG/DL (ref 0.93–1.7)
TSH SERPL DL<=0.05 MIU/L-ACNC: 0.79 UIU/ML (ref 0.27–4.2)
VIT B12 BLD-MCNC: 298 PG/ML (ref 211–946)
WBC # BLD AUTO: 8.18 10*3/MM3 (ref 3.4–10.8)

## 2021-04-09 PROCEDURE — 84439 ASSAY OF FREE THYROXINE: CPT | Performed by: OBSTETRICS & GYNECOLOGY

## 2021-04-09 PROCEDURE — 82728 ASSAY OF FERRITIN: CPT | Performed by: OBSTETRICS & GYNECOLOGY

## 2021-04-09 PROCEDURE — 80053 COMPREHEN METABOLIC PANEL: CPT

## 2021-04-09 PROCEDURE — 85027 COMPLETE CBC AUTOMATED: CPT

## 2021-04-09 PROCEDURE — 84443 ASSAY THYROID STIM HORMONE: CPT | Performed by: OBSTETRICS & GYNECOLOGY

## 2021-04-09 PROCEDURE — 82607 VITAMIN B-12: CPT | Performed by: OBSTETRICS & GYNECOLOGY

## 2021-04-09 PROCEDURE — 36415 COLL VENOUS BLD VENIPUNCTURE: CPT | Performed by: OBSTETRICS & GYNECOLOGY

## 2021-09-07 ENCOUNTER — LAB (OUTPATIENT)
Dept: LAB | Facility: HOSPITAL | Age: 26
End: 2021-09-07

## 2021-09-07 PROCEDURE — 87635 SARS-COV-2 COVID-19 AMP PRB: CPT | Performed by: FAMILY MEDICINE

## 2021-11-03 ENCOUNTER — OFFICE VISIT (OUTPATIENT)
Dept: FAMILY MEDICINE CLINIC | Facility: CLINIC | Age: 26
End: 2021-11-03

## 2021-11-03 ENCOUNTER — LAB (OUTPATIENT)
Dept: LAB | Facility: HOSPITAL | Age: 26
End: 2021-11-03

## 2021-11-03 VITALS
OXYGEN SATURATION: 98 % | HEIGHT: 67 IN | WEIGHT: 187.6 LBS | DIASTOLIC BLOOD PRESSURE: 91 MMHG | SYSTOLIC BLOOD PRESSURE: 120 MMHG | TEMPERATURE: 98.4 F | HEART RATE: 108 BPM | BODY MASS INDEX: 29.44 KG/M2

## 2021-11-03 DIAGNOSIS — F33.8 SEASONAL DEPRESSION (HCC): ICD-10-CM

## 2021-11-03 DIAGNOSIS — Z13.9 ENCOUNTER FOR HEALTH-RELATED SCREENING: Primary | ICD-10-CM

## 2021-11-03 PROCEDURE — 99213 OFFICE O/P EST LOW 20 MIN: CPT | Performed by: NURSE PRACTITIONER

## 2021-11-03 PROCEDURE — 80061 LIPID PANEL: CPT | Performed by: NURSE PRACTITIONER

## 2021-11-03 PROCEDURE — 83036 HEMOGLOBIN GLYCOSYLATED A1C: CPT | Performed by: NURSE PRACTITIONER

## 2021-11-03 RX ORDER — SERTRALINE HYDROCHLORIDE 25 MG/1
25 TABLET, FILM COATED ORAL DAILY
Qty: 30 TABLET | Refills: 0 | Status: SHIPPED | OUTPATIENT
Start: 2021-11-03 | End: 2022-12-01

## 2021-11-03 NOTE — PROGRESS NOTES
Subjective   Vidhi Moody is a 26 y.o. female. Employment physical    History of Present Illness   Pt presents today for employment physical for her husbands insurance. She also has concern of depression. She says she has history of seasonal depression. Pt notices depression usually around the fall but it improves in the spring. Took sertraline in the past but does not remember if it helped. Feels she has no motivation right now to get up and going. Denies any suicidal thoughts or actions. LMP one week ago. Takes OCP. Has 3 children including a 3 year old and twins that will be one on Thanksgiving. No trouble with bowel or bladder function.     The following portions of the patient's history were reviewed and updated as appropriate: allergies, current medications, past family history, past medical history, past social history, past surgical history, and problem list.    Review of Systems   Constitutional: Negative for chills, fatigue and fever.   HENT: Negative for congestion, ear pain, rhinorrhea and sore throat.    Eyes: Negative for blurred vision, double vision and visual disturbance.   Respiratory: Negative for cough, chest tightness, shortness of breath and wheezing.    Cardiovascular: Negative for chest pain, palpitations and leg swelling.   Gastrointestinal: Negative for abdominal pain, diarrhea, nausea and vomiting.   Endocrine: Negative for cold intolerance and heat intolerance.   Genitourinary: Negative for difficulty urinating, dysuria, frequency and hematuria.   Musculoskeletal: Negative for arthralgias, back pain, neck pain and neck stiffness.   Skin: Negative for dry skin, pallor, rash, skin lesions and bruise.   Allergic/Immunologic: Negative for environmental allergies, food allergies and immunocompromised state.   Neurological: Negative for dizziness, syncope, weakness, light-headedness, headache and confusion.   Hematological: Negative for adenopathy. Does not bruise/bleed easily.    Psychiatric/Behavioral: Positive for dysphoric mood. Negative for self-injury, sleep disturbance, suicidal ideas, depressed mood and stress. The patient is not nervous/anxious.        Vitals:    11/03/21 1528   BP: 120/91   Pulse: 108   Temp: 98.4 °F (36.9 °C)   SpO2: 98%     Body mass index is 29.38 kg/m².    Objective   Physical Exam  Vitals reviewed.   Constitutional:       General: She is not in acute distress.     Appearance: Normal appearance. She is well-developed. She is not ill-appearing.   HENT:      Head: Normocephalic.      Right Ear: Hearing, tympanic membrane, ear canal and external ear normal.      Left Ear: Hearing, tympanic membrane, ear canal and external ear normal.      Nose: Nose normal.      Mouth/Throat:      Lips: Pink.      Mouth: Mucous membranes are moist.      Pharynx: Oropharynx is clear. Uvula midline. No oropharyngeal exudate.   Eyes:      General: Lids are normal. Gaze aligned appropriately.      Conjunctiva/sclera: Conjunctivae normal.      Pupils: Pupils are equal, round, and reactive to light.   Neck:      Thyroid: No thyroid mass, thyromegaly or thyroid tenderness.   Cardiovascular:      Rate and Rhythm: Normal rate and regular rhythm.      Heart sounds: Normal heart sounds, S1 normal and S2 normal. No murmur heard.      Pulmonary:      Effort: Pulmonary effort is normal.      Breath sounds: Normal breath sounds and air entry. No decreased breath sounds, wheezing, rhonchi or rales.   Abdominal:      General: Abdomen is flat. Bowel sounds are normal.      Palpations: Abdomen is soft.      Tenderness: There is no abdominal tenderness.   Musculoskeletal:         General: Normal range of motion.      Cervical back: Normal range of motion and neck supple.   Lymphadenopathy:      Cervical: No cervical adenopathy.   Skin:     General: Skin is warm and dry.   Neurological:      General: No focal deficit present.      Mental Status: She is alert and oriented to person, place, and time.       Coordination: Coordination is intact.      Gait: Gait is intact.   Psychiatric:         Attention and Perception: Attention normal.         Mood and Affect: Mood normal.         Speech: Speech normal.         Behavior: Behavior normal. Behavior is cooperative.         Thought Content: Thought content normal.         Cognition and Memory: Cognition normal.         Judgment: Judgment normal.           Assessment/Plan   Diagnoses and all orders for this visit:    1. Encounter for health-related screening (Primary)  -     Lipid Panel  -     Hemoglobin A1c    2. Seasonal depression (HCC)  -     sertraline (Zoloft) 25 MG tablet; Take 1 tablet by mouth Daily.  Dispense: 30 tablet; Refill: 0    Physical exam unremarkable.  Labs ordered for health screening, will call pt with results.  Pt instructed to take zoloft 25 mg daily for depression. Pt denies any suicidal thoughts or actions. Pt understands that zoloft may cause suicidal thoughts or actions, if they develop these thoughts or actions they need to seek immediate medical treatment. Follow up 1 month to see how zoloft is working for her.  No follow-ups on file.  If symptoms do not improve or worsen, patient was instructed to return to clinic for further evaluation.         This document has been electronically signed by ADIA Sin on  November 3, 2021 16:02 CDT

## 2021-11-04 ENCOUNTER — PATIENT ROUNDING (BHMG ONLY) (OUTPATIENT)
Dept: FAMILY MEDICINE CLINIC | Facility: CLINIC | Age: 26
End: 2021-11-04

## 2021-11-04 LAB
CHOLEST SERPL-MCNC: 160 MG/DL (ref 0–200)
HBA1C MFR BLD: 4.73 % (ref 4.8–5.6)
HDLC SERPL-MCNC: 37 MG/DL (ref 40–60)
LDLC SERPL CALC-MCNC: 48 MG/DL (ref 0–100)
LDLC/HDLC SERPL: 0.52 {RATIO}
TRIGL SERPL-MCNC: 519 MG/DL (ref 0–150)
VLDLC SERPL-MCNC: 75 MG/DL (ref 5–40)

## 2021-11-04 NOTE — PROGRESS NOTES
November 4, 2021    Hello, may I speak with Vidhi Moody?    My name is Jaclyn Umana      I am  with [facility] - [place]  [address]  [facility] [address] [phone].    Before we get started may I verify your date of birth? 1995    I am calling to officially welcome you to our practice and ask about your recent visit. Is this a good time to talk? yes    Tell me about your visit with us. What things went well?  it was good/ everything went well       We're always looking for ways to make our patients' experiences even better. Do you have recommendations on ways we may improve?  no    Overall were you satisfied with your first visit to our practice? yes       I appreciate you taking the time to speak with me today. Is there anything else I can do for you? no      Thank you, and have a great day.

## 2021-12-23 PROBLEM — J98.8 VIRAL RESPIRATORY ILLNESS: Status: ACTIVE | Noted: 2021-12-23

## 2021-12-23 PROBLEM — B97.89 VIRAL RESPIRATORY ILLNESS: Status: ACTIVE | Noted: 2021-12-23

## 2021-12-23 PROCEDURE — 87635 SARS-COV-2 COVID-19 AMP PRB: CPT | Performed by: NURSE PRACTITIONER

## 2022-04-28 ENCOUNTER — LAB (OUTPATIENT)
Dept: LAB | Facility: HOSPITAL | Age: 27
End: 2022-04-28

## 2022-04-28 ENCOUNTER — OFFICE VISIT (OUTPATIENT)
Dept: OBSTETRICS AND GYNECOLOGY | Facility: CLINIC | Age: 27
End: 2022-04-28

## 2022-04-28 VITALS
HEIGHT: 67 IN | DIASTOLIC BLOOD PRESSURE: 82 MMHG | SYSTOLIC BLOOD PRESSURE: 124 MMHG | BODY MASS INDEX: 28.63 KG/M2 | WEIGHT: 182.4 LBS

## 2022-04-28 DIAGNOSIS — N93.9 ABNORMAL UTERINE BLEEDING (AUB): ICD-10-CM

## 2022-04-28 DIAGNOSIS — Z12.4 ENCOUNTER FOR PAPANICOLAOU SMEAR FOR CERVICAL CANCER SCREENING: Primary | ICD-10-CM

## 2022-04-28 LAB
BASOPHILS # BLD AUTO: 0.04 10*3/MM3 (ref 0–0.2)
BASOPHILS NFR BLD AUTO: 0.4 % (ref 0–1.5)
DEPRECATED RDW RBC AUTO: 37.6 FL (ref 37–54)
EOSINOPHIL # BLD AUTO: 0.07 10*3/MM3 (ref 0–0.4)
EOSINOPHIL NFR BLD AUTO: 0.7 % (ref 0.3–6.2)
ERYTHROCYTE [DISTWIDTH] IN BLOOD BY AUTOMATED COUNT: 12.8 % (ref 12.3–15.4)
ESTRADIOL SERPL HS-MCNC: 111 PG/ML
FERRITIN SERPL-MCNC: 43.5 NG/ML (ref 13–150)
FSH SERPL-ACNC: 3.41 MIU/ML
HCG SERPL QL: NEGATIVE
HCT VFR BLD AUTO: 41.9 % (ref 34–46.6)
HGB BLD-MCNC: 14.9 G/DL (ref 12–15.9)
IMM GRANULOCYTES # BLD AUTO: 0.05 10*3/MM3 (ref 0–0.05)
IMM GRANULOCYTES NFR BLD AUTO: 0.5 % (ref 0–0.5)
IRON 24H UR-MRATE: 48 MCG/DL (ref 37–145)
IRON SATN MFR SERPL: 10 % (ref 20–50)
LH SERPL-ACNC: 4.19 MIU/ML
LYMPHOCYTES # BLD AUTO: 3.39 10*3/MM3 (ref 0.7–3.1)
LYMPHOCYTES NFR BLD AUTO: 33.8 % (ref 19.6–45.3)
MCH RBC QN AUTO: 29 PG (ref 26.6–33)
MCHC RBC AUTO-ENTMCNC: 35.6 G/DL (ref 31.5–35.7)
MCV RBC AUTO: 81.5 FL (ref 79–97)
MONOCYTES # BLD AUTO: 0.54 10*3/MM3 (ref 0.1–0.9)
MONOCYTES NFR BLD AUTO: 5.4 % (ref 5–12)
NEUTROPHILS NFR BLD AUTO: 5.95 10*3/MM3 (ref 1.7–7)
NEUTROPHILS NFR BLD AUTO: 59.2 % (ref 42.7–76)
NRBC BLD AUTO-RTO: 0 /100 WBC (ref 0–0.2)
PLATELET # BLD AUTO: 245 10*3/MM3 (ref 140–450)
PMV BLD AUTO: 10 FL (ref 6–12)
PROLACTIN SERPL-MCNC: 4.77 NG/ML (ref 4.79–23.3)
RBC # BLD AUTO: 5.14 10*6/MM3 (ref 3.77–5.28)
T4 FREE SERPL-MCNC: 1.12 NG/DL (ref 0.93–1.7)
TIBC SERPL-MCNC: 493 MCG/DL (ref 298–536)
TRANSFERRIN SERPL-MCNC: 331 MG/DL (ref 200–360)
TSH SERPL DL<=0.05 MIU/L-ACNC: 0.83 UIU/ML (ref 0.27–4.2)
WBC NRBC COR # BLD: 10.04 10*3/MM3 (ref 3.4–10.8)

## 2022-04-28 PROCEDURE — 99395 PREV VISIT EST AGE 18-39: CPT | Performed by: OBSTETRICS & GYNECOLOGY

## 2022-04-28 PROCEDURE — 83002 ASSAY OF GONADOTROPIN (LH): CPT | Performed by: OBSTETRICS & GYNECOLOGY

## 2022-04-28 PROCEDURE — 82670 ASSAY OF TOTAL ESTRADIOL: CPT | Performed by: OBSTETRICS & GYNECOLOGY

## 2022-04-28 PROCEDURE — 84439 ASSAY OF FREE THYROXINE: CPT | Performed by: OBSTETRICS & GYNECOLOGY

## 2022-04-28 PROCEDURE — 84443 ASSAY THYROID STIM HORMONE: CPT | Performed by: OBSTETRICS & GYNECOLOGY

## 2022-04-28 PROCEDURE — 82728 ASSAY OF FERRITIN: CPT

## 2022-04-28 PROCEDURE — 84146 ASSAY OF PROLACTIN: CPT | Performed by: OBSTETRICS & GYNECOLOGY

## 2022-04-28 PROCEDURE — 84703 CHORIONIC GONADOTROPIN ASSAY: CPT

## 2022-04-28 PROCEDURE — 84402 ASSAY OF FREE TESTOSTERONE: CPT | Performed by: OBSTETRICS & GYNECOLOGY

## 2022-04-28 PROCEDURE — 83001 ASSAY OF GONADOTROPIN (FSH): CPT | Performed by: OBSTETRICS & GYNECOLOGY

## 2022-04-28 PROCEDURE — 36415 COLL VENOUS BLD VENIPUNCTURE: CPT | Performed by: OBSTETRICS & GYNECOLOGY

## 2022-04-28 PROCEDURE — 84403 ASSAY OF TOTAL TESTOSTERONE: CPT | Performed by: OBSTETRICS & GYNECOLOGY

## 2022-04-28 PROCEDURE — 83540 ASSAY OF IRON: CPT

## 2022-04-28 PROCEDURE — 84466 ASSAY OF TRANSFERRIN: CPT

## 2022-04-28 PROCEDURE — 85025 COMPLETE CBC W/AUTO DIFF WBC: CPT | Performed by: OBSTETRICS & GYNECOLOGY

## 2022-04-29 NOTE — PROGRESS NOTES
Vidhi Moody is a 27 y.o. y/o female.     Chief Complaint: I want to have an annual examination    HPI:   27 y.o. .  No LMP recorded. (Menstrual status: Oral contraceptives)..  Patient presents requesting annual examination.    She is also complaining of abnormal uterine bleeding bleeding is regular but very heavy.  She has had problems with hormonal men contraception in the past with moodiness.  Discussed IUD therapy but has had transitive had complications not interested in this discussed endometrial ablation.  Discussed hysterectomy its risk as major surgical procedure.  She does have a history of abnormal Pap smears in the past          Review of Systems     Constitutional: Denies night sweats    HENT: No hearing changes, denies ear pain    Eye: No eye pain; no foreign body in eye    Pulmonary: No hemoptysis    Cardiovascular: No claudication    GI: No hematemesis    Musculoskeletal: No arthralgias, no joint swelling    Endocrine: No polydipsia or polyuria    Hematologic: Denies any free bleeding    Psychiatric: Denies any delusions    The following portions of the patient's history were reviewed and updated as appropriate: allergies, current medications, past family history, past medical history, past social history, past surgical history and problem list.    Allergies   Allergen Reactions   • Adhesive Tape Hives   • Dilaudid [Hydromorphone] Irritability        Outpatient Medications Prior to Visit   Medication Sig Dispense Refill   • labetalol (NORMODYNE) 300 MG tablet Take 1 tablet by mouth 2 (Two) Times a Day. 60 tablet 1   • sertraline (Zoloft) 25 MG tablet Take 1 tablet by mouth Daily. 30 tablet 0   • norgestimate-ethinyl estradiol (Ortho Tri-Cyclen Lo) 0.18/0.215/0.25 MG-25 MCG per tablet Take 1 tablet by mouth Daily. 28 tablet 12     No facility-administered medications prior to visit.        The patient has a family history of   Family History   Problem Relation Age of Onset   •  Hypertension Mother    • Hyperlipidemia Mother    • Thyroid nodules Mother    • No Known Problems Father    • No Known Problems Maternal Grandmother    • Hypertension Maternal Grandfather    • Hyperlipidemia Maternal Grandfather    • Heart disease Maternal Grandfather    • Breast cancer Paternal Grandmother 50   • No Known Problems Paternal Grandfather    • Colon cancer Paternal Uncle 50   • Diabetes Paternal Aunt         unsure which type or age of onset   • No Known Problems Daughter         Past Medical History:   Diagnosis Date   • Abnormal Pap smear of cervix    • Enlarged tonsils    • Miscarriage    • Ovarian cyst    • Preeclampsia    • Sore throat, chronic    • Varicella         OB History        4    Para   2    Term   1       1    AB   2    Living   3       SAB   0    IAB   0    Ectopic   0    Molar   0    Multiple   1    Live Births   3                 Social History     Socioeconomic History   • Marital status:    Tobacco Use   • Smoking status: Former Smoker   • Smokeless tobacco: Never Used   Substance and Sexual Activity   • Alcohol use: Not Currently     Comment: occassional   • Drug use: No   • Sexual activity: Yes     Partners: Male     Birth control/protection: Condom     Comment: last pap smear 2019 negative         Past Surgical History:   Procedure Laterality Date   •  SECTION Bilateral 2020    Procedure:  SECTION PRIMARY;  Surgeon: Diego Tate MD;  Location: [facility] LABOR DELIVERY;  Service: Obstetrics/Gynecology;  Laterality: Bilateral;   • DILATATION AND CURETTAGE     • SHOULDER SURGERY Right    • TONSILLECTOMY     • TONSILLECTOMY AND ADENOIDECTOMY N/A 2018    Procedure: TONSILLECTOMY AND NASOPHARYNGOSCOPY   (PLEASE CALL [facility],);  Surgeon: Severiano Marshall MD;  Location: [facility] OR;  Service: ENT   • WISDOM TOOTH EXTRACTION          Patient Active Problem List   Diagnosis   • PCOS (polycystic ovarian syndrome)   • Menorrhagia with regular  "cycle   • BMI 30.0-30.9,adult   • Atypical squamous cells of undetermined significance (ASCUS) on Papanicolaou smear of cervix   • Oral contraceptive pill surveillance   • Twin gestation in first trimester   • Pregnancy confirmed by positive urine test   • Hx of preeclampsia, prior pregnancy, currently pregnant   • Pregnancy   • Cramping affecting pregnancy, antepartum   • Nausea and vomiting during pregnancy   •  screening for malformation using ultrasonics   • Encounter for other  screening follow-up   • Nausea and vomiting   • Pregnancy complicated by fetal cerebral ventriculomegaly   •  uterine contractions, antepartum   • Exposure to cytomegalovirus (CMV)   • Elevated blood pressure affecting pregnancy in second trimester, antepartum   • Poor fetal growth affecting management of mother in third trimester   • Severe preeclampsia   • History of  section   • Pre-existing essential hypertension during pregnancy in third trimester   • Postoperative state   • Postpartum state   • Viral respiratory illness        Documented Vitals    22 0856   BP: 124/82   Weight: 82.7 kg (182 lb 6.4 oz)   Height: 170.2 cm (67\")        Body mass index is 28.57 kg/m².    Physical Exam  Constitutional: Appears to be in no acute distress; Eyes: Sclerae normal; Endocrine system: Thyroid palpation is normal; Pulmonary system: Lungs clear; Cardiovascular system: Heart regular rate and rhythm; Gastrointestinal system: Abdomen soft and nontender, active bowel sounds; Urologic system: CVA negative; Psychiatric: Appropriate insight; Neurologic: Gait within normal limits female genital system external genitalia normal vagina normal cervix no gross lesion Pap smear performed uterus anteverted anteflexed 6 to 8 weeks I detect no adnexal enlargement    Laboratory Data:   Lab Results - Last 18 Months   Lab Units 21  0811 20  0920 20  1951 20  0725 20  1425   GLUCOSE mg/dL 97 103* " 106* 100* 74   BUN mg/dL 8 14 12 7 6   CREATININE mg/dL 0.75 0.65 0.64 0.62 0.62   SODIUM mmol/L 139 136 135* 133* 137   POTASSIUM mmol/L 3.9 4.9 4.0 4.5 4.0   CHLORIDE mmol/L 104 102 101 101 105   CO2 mmol/L 21.7* 23.0 24.0 22.0 20.0*   CALCIUM mg/dL 9.6 9.5 9.0 8.0* 9.3   TOTAL PROTEIN g/dL 7.1 6.6 5.9* 5.5* 6.3   ALBUMIN g/dL 4.60 3.50 3.20* 3.00* 3.40*   ALT (SGPT) U/L 17 30 19 10 13   AST (SGOT) U/L 22 67* 48* 32 33*   ALK PHOS U/L 90 160* 134* 109 131*   BILIRUBIN mg/dL 0.3 0.2 0.2 0.2 0.4   EGFR IF NONAFRICN AM mL/min/1.73 92 109 111 115 115   GLOBULIN gm/dL 2.5 3.1 2.7 2.5 2.9   A/G RATIO g/dL 1.8 1.1 1.2 1.2 1.2   BUN / CREAT RATIO  10.7 21.5 18.8 11.3 9.7   ANION GAP mmol/L 13.3 11.0 10.0 10.0 12.0     Lab Results - Last 18 Months   Lab Units 04/28/22  0938 04/09/21  0811 11/28/20  1951 11/26/20  0725 11/25/20  1425   WBC 10*3/mm3 10.04 8.18 13.09* 20.45* 15.49*   RBC 10*6/mm3 5.14 4.90 3.78 3.80 4.36   HEMOGLOBIN g/dL 14.9 14.0 11.2* 11.3* 12.8   HEMATOCRIT % 41.9 39.3 33.2* 32.4* 36.8   MCV fL 81.5 80.2 87.8 85.3 84.4   MCH pg 29.0 28.6 29.6 29.7 29.4   MCHC g/dL 35.6 35.6 33.7 34.9 34.8   RDW % 12.8 13.7 13.9 14.1 14.0   RDW-SD fl 37.6 39.1 44.2 43.3 43.0   MPV fL 10.0 10.7 10.9 11.5 11.7   PLATELETS 10*3/mm3 245 227 157 155 141     Lab Results - Last 18 Months   Lab Units 04/28/22  0938   HCG QUALITATIVE  Negative                                                                                                                                         Counseling              Nutrition/activity/weight patient understands relationship between exercise caloric intake BMI            Family planning has been is undergone vasectomy            Tobacco/alcohol/illicit drug former smoker this greatly encouraged            Immunization importance of COVID vaccination reviewed  Assessment      Diagnosis Plan   1. Encounter for Papanicolaou smear for cervical cancer screening  Liquid-based Pap Smear, Screening     Liquid-based Pap Smear, Screening   2. Abnormal uterine bleeding (AUB)  CBC Auto Differential    Estradiol    Ferritin    Follicle Stimulating Hormone    hCG, Serum, Qualitative    Iron Profile    Luteinizing Hormone    Prolactin    TSH+Free T4    US Non-ob Transvaginal    Testosterone, Free, Total   Again pain ultrasound for anatomic causes of bleeding along with laboratory studies.  She wants to give a trial to transdermal hormonal therapy and see if this will be better tolerated for her bleeding and will go from there.    Plan         New Medications Ordered This Visit   Medications   • norelgestromin-ethinyl estradiol (ORTHO EVRA) 150-35 MCG/24HR     Sig: Place 1 patch on the skin as directed by provider 1 (One) Time Per Week.     Dispense:  3 patch     Refill:  12             This document has been electronically signed by Diego Tate MD on April 28, 2022 22:32 CDT    Please note that portions of this note were completed with a voice recognition program.

## 2022-05-02 LAB
TESTOST FREE SERPL-MCNC: 0.8 PG/ML (ref 0–4.2)
TESTOST SERPL-MCNC: 25 NG/DL (ref 13–71)

## 2022-05-03 LAB
LAB AP CASE REPORT: NORMAL
PATH INTERP SPEC-IMP: NORMAL

## 2022-06-06 ENCOUNTER — TELEPHONE (OUTPATIENT)
Dept: OBSTETRICS AND GYNECOLOGY | Facility: CLINIC | Age: 27
End: 2022-06-06

## 2022-06-06 RX ORDER — NORGESTIMATE AND ETHINYL ESTRADIOL 0.25-0.035
1 KIT ORAL DAILY
Qty: 28 TABLET | Refills: 12 | Status: SHIPPED | OUTPATIENT
Start: 2022-06-06 | End: 2022-07-04

## 2022-06-06 NOTE — TELEPHONE ENCOUNTER
----- Message from Sydnie Lomeli MA sent at 6/6/2022  1:42 PM CDT -----  Regarding: FW: BC    ----- Message -----  From: Ana Rod RegSched Rep  Sent: 6/6/2022   8:44 AM CDT  To: Sydnie Lomeli MA  Subject: BC                                               DR. LEUNG PUT HER ON BC THAT'S NOT STAYING PUT. SHE IS REQUESTING IF SHE CAN GET PRECRIBED A LOWER DOSAGE OF HORMONE PILLS.

## 2022-06-06 NOTE — TELEPHONE ENCOUNTER
PT called requesting for BC to be changed from the patch to the pill. She wants it sent to [facility]. PT can be reached at the number on file.

## 2022-12-01 ENCOUNTER — OFFICE VISIT (OUTPATIENT)
Dept: FAMILY MEDICINE CLINIC | Facility: CLINIC | Age: 27
End: 2022-12-01

## 2022-12-01 VITALS
HEART RATE: 86 BPM | DIASTOLIC BLOOD PRESSURE: 99 MMHG | TEMPERATURE: 98.4 F | OXYGEN SATURATION: 98 % | SYSTOLIC BLOOD PRESSURE: 135 MMHG

## 2022-12-01 DIAGNOSIS — J10.1 INFLUENZA A: Primary | ICD-10-CM

## 2022-12-01 DIAGNOSIS — J02.9 SORE THROAT: ICD-10-CM

## 2022-12-01 DIAGNOSIS — R21 RASH: ICD-10-CM

## 2022-12-01 LAB
EXPIRATION DATE: ABNORMAL
FLUAV AG UPPER RESP QL IA.RAPID: DETECTED
FLUBV AG UPPER RESP QL IA.RAPID: NOT DETECTED
INTERNAL CONTROL: ABNORMAL
Lab: ABNORMAL
SARS-COV-2 AG UPPER RESP QL IA.RAPID: NOT DETECTED

## 2022-12-01 PROCEDURE — 99213 OFFICE O/P EST LOW 20 MIN: CPT | Performed by: NURSE PRACTITIONER

## 2022-12-01 PROCEDURE — 87428 SARSCOV & INF VIR A&B AG IA: CPT | Performed by: NURSE PRACTITIONER

## 2022-12-01 NOTE — PROGRESS NOTES
Subjective   Vidhi Moody is a 27 y.o. female. Flu like symptoms     History of Present Illness   Patient presents today for flu like symptoms. She says she started feeling bad on Monday. Symptoms include fever, chills, body aches, congestion. She also reports rash on her hands bilaterally. She says rash is mildly pruritic. Denies any changes to skin care products. Alleviating factors: cortisone cream.     The following portions of the patient's history were reviewed and updated as appropriate: allergies, current medications, past family history, past medical history, past social history, past surgical history, and problem list.    Review of Systems   Constitutional: Positive for chills and fever. Negative for fatigue.   HENT: Positive for congestion. Negative for ear pain, rhinorrhea and sore throat.    Eyes: Negative for blurred vision, double vision and visual disturbance.   Respiratory: Negative for cough, chest tightness, shortness of breath and wheezing.    Cardiovascular: Negative for chest pain, palpitations and leg swelling.   Gastrointestinal: Negative for abdominal pain, diarrhea, nausea and vomiting.   Endocrine: Negative for cold intolerance and heat intolerance.   Genitourinary: Negative for difficulty urinating, dysuria, frequency and hematuria.   Musculoskeletal: Positive for myalgias. Negative for arthralgias, back pain, neck pain and neck stiffness.   Skin: Negative for dry skin, pallor, rash, skin lesions and wound.   Allergic/Immunologic: Negative for environmental allergies, food allergies and immunocompromised state.   Neurological: Negative for dizziness, syncope, weakness, light-headedness, headache and confusion.   Hematological: Negative for adenopathy. Does not bruise/bleed easily.   Psychiatric/Behavioral: Negative for self-injury, sleep disturbance, suicidal ideas, depressed mood and stress. The patient is not nervous/anxious.        Vitals:    12/01/22 1532   BP: 135/99    Pulse: 86   Temp: 98.4 °F (36.9 °C)   SpO2: 98%     There is no height or weight on file to calculate BMI.    Objective   Physical Exam  Vitals and nursing note reviewed.   Constitutional:       General: She is not in acute distress.     Appearance: She is well-developed. She is not ill-appearing.   HENT:      Head: Normocephalic.      Right Ear: Hearing, tympanic membrane, ear canal and external ear normal.      Left Ear: Hearing, tympanic membrane, ear canal and external ear normal.      Nose: Nose normal.      Mouth/Throat:      Lips: Pink.      Mouth: Mucous membranes are moist.      Pharynx: Oropharynx is clear. Uvula midline. No oropharyngeal exudate.   Eyes:      General: Lids are normal. Gaze aligned appropriately.      Conjunctiva/sclera: Conjunctivae normal.      Pupils: Pupils are equal, round, and reactive to light.   Neck:      Thyroid: No thyroid mass, thyromegaly or thyroid tenderness.   Cardiovascular:      Rate and Rhythm: Normal rate and regular rhythm.      Heart sounds: Normal heart sounds, S1 normal and S2 normal. No murmur heard.  Pulmonary:      Effort: Pulmonary effort is normal.      Breath sounds: Normal breath sounds and air entry. No decreased breath sounds, wheezing, rhonchi or rales.   Abdominal:      General: Abdomen is flat. Bowel sounds are normal.      Palpations: Abdomen is soft.      Tenderness: There is no abdominal tenderness.   Musculoskeletal:         General: Normal range of motion.      Cervical back: Full passive range of motion without pain, normal range of motion and neck supple.   Lymphadenopathy:      Cervical: No cervical adenopathy.   Skin:     General: Skin is warm and dry.      Findings: Rash present. Rash is vesicular.          Neurological:      General: No focal deficit present.      Mental Status: She is alert and oriented to person, place, and time.      Coordination: Coordination is intact.      Gait: Gait is intact.   Psychiatric:         Attention and  Perception: Attention normal.         Mood and Affect: Mood normal.         Speech: Speech normal.         Behavior: Behavior normal. Behavior is cooperative.         Thought Content: Thought content normal.         Cognition and Memory: Cognition normal.         Judgment: Judgment normal.           Assessment & Plan   Diagnoses and all orders for this visit:    1. Influenza A (Primary)    2. Sore throat  -     POCT SARS-CoV-2 Antigen NURY + Flu    3. Rash  -     triamcinolone (KENALOG) 0.1 % ointment; Apply 1 application topically to the appropriate area as directed 2 (Two) Times a Day.  Dispense: 30 g; Refill: 0      Flu swab positive. Pt instructed to wear mask when out in public, wash hands often, cover mouth when coughing (use bend of arm, shoulder, not hands), get plenty of rest, and stay well hydrated. Tylenol or ibuprofen as directed for fever/pain.     Rash appears to be of viral origin. Rash noted on hands and left foot. Could be HFM, difficult to tell since patient also has flu. Will try kenalog ointment to see if it helps.    If symptoms do not improve or worsen, patient was instructed to return to clinic for further evaluation.         This document has been electronically signed by ADIA Sin on  December 1, 2022 16:52 CST

## 2023-01-17 NOTE — H&P
Chief complaint  blood in throat    Subjective     History of Present Illness  Patient awoke this morning while in bed asleep with some blood in her throat.  She is approximately 1 week status post tonsillectomy..  She's been drinking relatively well but having diarrhea.  She's not had any fever chills on medicine she's taking this for pain and for nausea.  She has gas her stomach but not truly having abdominal pain and small multiple amounts of informed diarrhea she's not eat much for the last week  Review of Systems   Constitutional: Positive for appetite change. Negative for fever.   HENT: Positive for sore throat. Negative for facial swelling.    Eyes: Negative.    Respiratory: Negative.    Cardiovascular: Negative.    Gastrointestinal: Positive for diarrhea and nausea.   Endocrine: Negative.    Genitourinary: Negative.    Musculoskeletal: Negative.    Skin: Negative.    Allergic/Immunologic: Negative.    Neurological: Negative.    Hematological: Negative for adenopathy. Does not bruise/bleed easily.   Psychiatric/Behavioral: Negative.       Allergies   Allergen Reactions   • Hydrocodone Nausea And Vomiting   • Dilaudid [Hydromorphone] Irritability   • Adhesive Tape Hives         Current Facility-Administered Medications:   •  sodium chloride 0.9 % bolus 1,000 mL, 1,000 mL, Intravenous, Once, Akbar Piña MD  •  sodium chloride 0.9 % infusion, 125 mL/hr, Intravenous, Continuous, Akbar Piña MD    Current Outpatient Prescriptions:   •  ondansetron ODT (ZOFRAN ODT) 8 MG disintegrating tablet, Take 1 tablet by mouth Every 8 (Eight) Hours As Needed for Nausea or Vomiting., Disp: 15 tablet, Rfl: 1  •  oxyCODONE (ROXICODONE) 5 MG/5ML solution, Take 7.5 mL by mouth Every 4 (Four) Hours As Needed for Severe Pain ., Disp: 473 mL, Rfl: 0     Past Medical History:   Diagnosis Date   • Enlarged tonsils    • Miscarriage    • Sore throat, chronic        Past Surgical History:   Procedure Laterality  Detail Level: Detailed Date   • DILATATION AND CURETTAGE     • SHOULDER SURGERY Right    • TONSILLECTOMY AND ADENOIDECTOMY N/A 5/11/2018    Procedure: TONSILLECTOMY AND NASOPHARYNGOSCOPY   (PLEASE CALL PAT,);  Surgeon: Severiano Marshall MD;  Location: [facility];  Service: ENT   • WISDOM TOOTH EXTRACTION         Social History     Social History   • Marital status:      Spouse name: N/A   • Number of children: N/A   • Years of education: N/A     Occupational History   • Not on file.     Social History Main Topics   • Smoking status: Never Smoker   • Smokeless tobacco: Never Used   • Alcohol use Yes      Comment: occassional   • Drug use: No   • Sexual activity: Defer     Other Topics Concern   • Not on file     Social History Narrative   • No narrative on file       Family History   Problem Relation Age of Onset   • Hypertension Mother    • Hypertension Maternal Grandfather    • Hyperlipidemia Maternal Grandfather    • Heart disease Maternal Grandfather    • Breast cancer Paternal Grandmother 50   • Colon cancer Paternal Uncle 50   • Diabetes Paternal Aunt         unsure which type or age of onset       Patient Active Problem List   Diagnosis   • Bronchitis   • Non morbid obesity due to excess calories   • Migraine without aura and without status migrainosus, not intractable   • PCOS (polycystic ovarian syndrome)   • Tonsillar abscess   • Chronic tonsillitis   • Post-tonsillectomy hemorrhage      Past Medical History:   Diagnosis Date   • Enlarged tonsils    • Miscarriage    • Sore throat, chronic      Patient had a history of chronic tonsillitis and recently had tonsillar abscess is not been on antibiotics without for over a week underwent her surgery without complication and then presents as this morning with the above symptoms  Objective      Vital Signs  Temp:  [98 °F (36.7 °C)] 98 °F (36.7 °C)  Heart Rate:  [112-115] 115  Resp:  [18] 18  BP: (129-133)/(87-92) 133/92    Physical Exam   Constitutional: She appears well-developed  Patient Specific Counseling (Will Not Stick From Patient To Patient): Refer to Endocrinologist. and well-nourished.   HENT:   Head: Atraumatic.   Right Ear: External ear normal.   Left Ear: External ear normal.   Nose: Nose normal.   Eyes: Conjunctivae are normal.   Neck: Normal range of motion. No tracheal deviation present.   Cardiovascular: Regular rhythm.    Pulmonary/Chest: Effort normal.   Abdominal: Soft. She exhibits no distension. There is no tenderness. There is no guarding.   Musculoskeletal: Normal range of motion.   Neurological: She is alert.   Skin: Skin is warm.   Psychiatric: She has a normal mood and affect. Her behavior is normal. Thought content normal.    some mild stain of all blood in the right tonsillar fossa but no clot seen on either side she has some uvular swelling but no other findings.  There is no evidence of yeast infection  Reveals no unusual adenopathy or tenderness or swelling  Results Review:     CBC Auto Differential   Order: [id number] - Part of Panel Order [id number]   Status:  Final result   Visible to patient:  No (Not Released)    Ref Range & Units 06:44   WBC 3.20 - 9.80 10*3/mm3 8.39    RBC 3.77 - 5.16 10*6/mm3 4.50    Hemoglobin 12.0 - 15.5 g/dL 13.1    Hematocrit 35.0 - 45.0 % 36.2    MCV 80.0 - 98.0 fL 80.4    MCH 26.5 - 34.0 pg 29.1    MCHC 31.4 - 36.0 g/dL 36.2     RDW 11.5 - 14.5 % 12.6    RDW-SD 36.4 - 46.3 fl 36.5    MPV 8.0 - 12.0 fL 9.6    Platelets 150 - 450 10*3/mm3 217    Neutrophil % 37.0 - 80.0 % 55.9    Lymphocyte % 10.0 - 50.0 % 32.3    Monocyte % 0.0 - 12.0 % 8.9    Eosinophil % 0.0 - 7.0 % 2.3    Basophil % 0.0 - 2.0 % 0.2    Immature Grans % 0.0 - 0.5 % 0.4    Neutrophils, Absolute 2.00 - 8.60 10*3/mm3 4.69    Lymphocytes, Absolute 0.60 - 4.20 10*3/mm3 2.71    Monocytes, Absolute 0.00 - 0.90 10*3/mm3 0.75    Eosinophils, Absolute 0.00 - 0.70 10*3/mm3 0.19    Basophils, Absolute 0.00 - 0.20 10*3/mm3 0.02    Immature Grans, Absolute 0.00 - 0.02 10*3/mm3 0.03     Resulting Agency  [facility]      Specimen Collected: 05/17/18 06:44 Last Resulted:  05/17/18 07:03                                 Assessment/Plan     Active Problems:    Post-tonsillectomy hemorrhage  Currently resolved no active bleeding  Diarrhea  Assessment & Plan  Tthe patient is not anemic  I discussed the patients findings and my recommendations with The patient and her   Observe her for further bleeding and because her pulses elevated we'll give her fluids.  She'll be Nothing by mouth till lunchtime no further bleeding will begin liquids and she does well can possibly discharge her this afternoon this point there is no need for cautery her operative intervention.  According to protocol for C. difficile she does not qualify for C. difficile testing will give fluids and Lomotil as needed schisis with ER physician Dr. Piña.  When the patient and she has further significant bleeding will need to consider operative intervention on the operating room we'll try and avoid that if all possible and she is agreement this approach all questions were answered.  Severiano Marshall MD  05/17/18  7:30 AM    Time:  35 min   Erythromycin Pregnancy And Lactation Text: This medication is Pregnancy Category B and is considered safe during pregnancy. It is also excreted in breast milk. Winlevi Pregnancy And Lactation Text: This medication is considered safe during pregnancy and breastfeeding. Aklief Pregnancy And Lactation Text: It is unknown if this medication is safe to use during pregnancy.  It is unknown if this medication is excreted in breast milk.  Breastfeeding women should use the topical cream on the smallest area of the skin for the shortest time needed while breastfeeding.  Do not apply to nipple and areola. Tazorac Counseling:  Patient advised that medication is irritating and drying.  Patient may need to apply sparingly and wash off after an hour before eventually leaving it on overnight.  The patient verbalized understanding of the proper use and possible adverse effects of tazorac.  All of the patient's questions and concerns were addressed. Sarecycline Counseling: Patient advised regarding possible photosensitivity and discoloration of the teeth, skin, lips, tongue and gums.  Patient instructed to avoid sunlight, if possible.  When exposed to sunlight, patients should wear protective clothing, sunglasses, and sunscreen.  The patient was instructed to call the office immediately if the following severe adverse effects occur:  hearing changes, easy bruising/bleeding, severe headache, or vision changes.  The patient verbalized understanding of the proper use and possible adverse effects of sarecycline.  All of the patient's questions and concerns were addressed. Birth Control Pills Counseling: Birth Control Pill Counseling: I discussed with the patient the potential side effects of OCPs including but not limited to increased risk of stroke, heart attack, thrombophlebitis, deep venous thrombosis, hepatic adenomas, breast changes, GI upset, headaches, and depression.  The patient verbalized understanding of the proper use and possible adverse effects of OCPs. All of the patient's questions and concerns were addressed. Bactrim Counseling:  I discussed with the patient the risks of sulfa antibiotics including but not limited to GI upset, allergic reaction, drug rash, diarrhea, dizziness, photosensitivity, and yeast infections.  Rarely, more serious reactions can occur including but not limited to aplastic anemia, agranulocytosis, methemoglobinemia, blood dyscrasias, liver or kidney failure, lung infiltrates or desquamative/blistering drug rashes. Doxycycline Pregnancy And Lactation Text: This medication is Pregnancy Category D and not consider safe during pregnancy. It is also excreted in breast milk but is considered safe for shorter treatment courses. Tetracycline Pregnancy And Lactation Text: This medication is Pregnancy Category D and not consider safe during pregnancy. It is also excreted in breast milk. Topical Retinoid counseling:  Patient advised to apply a pea-sized amount only at bedtime and wait 30 minutes after washing their face before applying.  If too drying, patient may add a non-comedogenic moisturizer. The patient verbalized understanding of the proper use and possible adverse effects of retinoids.  All of the patient's questions and concerns were addressed. Minocycline Counseling: Patient advised regarding possible photosensitivity and discoloration of the teeth, skin, lips, tongue and gums.  Patient instructed to avoid sunlight, if possible.  When exposed to sunlight, patients should wear protective clothing, sunglasses, and sunscreen.  The patient was instructed to call the office immediately if the following severe adverse effects occur:  hearing changes, easy bruising/bleeding, severe headache, or vision changes.  The patient verbalized understanding of the proper use and possible adverse effects of minocycline.  All of the patient's questions and concerns were addressed. Topical Sulfur Applications Pregnancy And Lactation Text: This medication is Pregnancy Category C and has an unknown safety profile during pregnancy. It is unknown if this topical medication is excreted in breast milk. Topical Clindamycin Pregnancy And Lactation Text: This medication is Pregnancy Category B and is considered safe during pregnancy. It is unknown if it is excreted in breast milk. Spironolactone Pregnancy And Lactation Text: This medication can cause feminization of the male fetus and should be avoided during pregnancy. The active metabolite is also found in breast milk. Benzoyl Peroxide Counseling: Patient counseled that medicine may cause skin irritation and bleach clothing.  In the event of skin irritation, the patient was advised to reduce the amount of the drug applied or use it less frequently.   The patient verbalized understanding of the proper use and possible adverse effects of benzoyl peroxide.  All of the patient's questions and concerns were addressed. High Dose Vitamin A Counseling: Side effects reviewed, pt to contact office should one occur. Azithromycin Counseling:  I discussed with the patient the risks of azithromycin including but not limited to GI upset, allergic reaction, drug rash, diarrhea, and yeast infections. Dapsone Pregnancy And Lactation Text: This medication is Pregnancy Category C and is not considered safe during pregnancy or breast feeding. Birth Control Pills Pregnancy And Lactation Text: This medication should be avoided if pregnant and for the first 30 days post-partum. Isotretinoin Counseling: Patient should get monthly blood tests, not donate blood, not drive at night if vision affected, not share medication, and not undergo elective surgery for 6 months after tx completed. Side effects reviewed, pt to contact office should one occur. Azelaic Acid Counseling: Patient counseled that medicine may cause skin irritation and to avoid applying near the eyes.  In the event of skin irritation, the patient was advised to reduce the amount of the drug applied or use it less frequently.   The patient verbalized understanding of the proper use and possible adverse effects of azelaic acid.  All of the patient's questions and concerns were addressed. Aklief counseling:  Patient advised to apply a pea-sized amount only at bedtime and wait 30 minutes after washing their face before applying.  If too drying, patient may add a non-comedogenic moisturizer.  The most commonly reported side effects including irritation, redness, scaling, dryness, stinging, burning, itching, and increased risk of sunburn.  The patient verbalized understanding of the proper use and possible adverse effects of retinoids.  All of the patient's questions and concerns were addressed. Topical Retinoid Pregnancy And Lactation Text: This medication is Pregnancy Category C. It is unknown if this medication is excreted in breast milk. Winlevi Counseling:  I discussed with the patient the risks of topical clascoterone including but not limited to erythema, scaling, itching, and stinging. Patient voiced their understanding. Bactrim Pregnancy And Lactation Text: This medication is Pregnancy Category D and is known to cause fetal risk.  It is also excreted in breast milk. Erythromycin Counseling:  I discussed with the patient the risks of erythromycin including but not limited to GI upset, allergic reaction, drug rash, diarrhea, increase in liver enzymes, and yeast infections. Tazorac Pregnancy And Lactation Text: This medication is not safe during pregnancy. It is unknown if this medication is excreted in breast milk. Include Pregnancy/Lactation Warning?: No Azithromycin Pregnancy And Lactation Text: This medication is considered safe during pregnancy and is also secreted in breast milk. Spironolactone Counseling: Patient advised regarding risks of diarrhea, abdominal pain, hyperkalemia, birth defects (for female patients), liver toxicity and renal toxicity. The patient may need blood work to monitor liver and kidney function and potassium levels while on therapy. The patient verbalized understanding of the proper use and possible adverse effects of spironolactone.  All of the patient's questions and concerns were addressed. Isotretinoin Pregnancy And Lactation Text: This medication is Pregnancy Category X and is considered extremely dangerous during pregnancy. It is unknown if it is excreted in breast milk. Dapsone Counseling: I discussed with the patient the risks of dapsone including but not limited to hemolytic anemia, agranulocytosis, rashes, methemoglobinemia, kidney failure, peripheral neuropathy, headaches, GI upset, and liver toxicity.  Patients who start dapsone require monitoring including baseline LFTs and weekly CBCs for the first month, then every month thereafter.  The patient verbalized understanding of the proper use and possible adverse effects of dapsone.  All of the patient's questions and concerns were addressed. High Dose Vitamin A Pregnancy And Lactation Text: High dose vitamin A therapy is contraindicated during pregnancy and breast feeding. Doxycycline Counseling:  Patient counseled regarding possible photosensitivity and increased risk for sunburn.  Patient instructed to avoid sunlight, if possible.  When exposed to sunlight, patients should wear protective clothing, sunglasses, and sunscreen.  The patient was instructed to call the office immediately if the following severe adverse effects occur:  hearing changes, easy bruising/bleeding, severe headache, or vision changes.  The patient verbalized understanding of the proper use and possible adverse effects of doxycycline.  All of the patient's questions and concerns were addressed. Tetracycline Counseling: Patient counseled regarding possible photosensitivity and increased risk for sunburn.  Patient instructed to avoid sunlight, if possible.  When exposed to sunlight, patients should wear protective clothing, sunglasses, and sunscreen.  The patient was instructed to call the office immediately if the following severe adverse effects occur:  hearing changes, easy bruising/bleeding, severe headache, or vision changes.  The patient verbalized understanding of the proper use and possible adverse effects of tetracycline.  All of the patient's questions and concerns were addressed. Patient understands to avoid pregnancy while on therapy due to potential birth defects. Benzoyl Peroxide Pregnancy And Lactation Text: This medication is Pregnancy Category C. It is unknown if benzoyl peroxide is excreted in breast milk. Topical Sulfur Applications Counseling: Topical Sulfur Counseling: Patient counseled that this medication may cause skin irritation or allergic reactions.  In the event of skin irritation, the patient was advised to reduce the amount of the drug applied or use it less frequently.   The patient verbalized understanding of the proper use and possible adverse effects of topical sulfur application.  All of the patient's questions and concerns were addressed. Azelaic Acid Pregnancy And Lactation Text: This medication is considered safe during pregnancy and breast feeding. Topical Clindamycin Counseling: Patient counseled that this medication may cause skin irritation or allergic reactions.  In the event of skin irritation, the patient was advised to reduce the amount of the drug applied or use it less frequently.   The patient verbalized understanding of the proper use and possible adverse effects of clindamycin.  All of the patient's questions and concerns were addressed.

## 2023-03-03 ENCOUNTER — TELEPHONE (OUTPATIENT)
Dept: OBSTETRICS AND GYNECOLOGY | Facility: CLINIC | Age: 28
End: 2023-03-03

## 2023-03-03 RX ORDER — NORETHINDRONE AND ETHINYL ESTRADIOL 1 MG-35MCG
1 KIT ORAL DAILY
Qty: 28 TABLET | Refills: 3 | Status: SHIPPED | OUTPATIENT
Start: 2023-03-03

## 2023-03-03 NOTE — TELEPHONE ENCOUNTER
PT having issues with irregular periods while on BC. PT requested a call to the number on file to discuss changing the BC.PT currently uses [facility].

## 2023-03-03 NOTE — TELEPHONE ENCOUNTER
Spoke with patient. I was able to switch her BC . Patient is to follow up with Dr. Tate in the next month to see how its working.

## 2023-05-05 ENCOUNTER — OFFICE VISIT (OUTPATIENT)
Dept: OBSTETRICS AND GYNECOLOGY | Facility: CLINIC | Age: 28
End: 2023-05-05
Payer: COMMERCIAL

## 2023-05-05 VITALS
BODY MASS INDEX: 29.82 KG/M2 | WEIGHT: 190 LBS | HEIGHT: 67 IN | SYSTOLIC BLOOD PRESSURE: 126 MMHG | DIASTOLIC BLOOD PRESSURE: 88 MMHG

## 2023-05-05 DIAGNOSIS — N93.9 ABNORMAL UTERINE BLEEDING (AUB): ICD-10-CM

## 2023-05-05 DIAGNOSIS — E28.2 PCOS (POLYCYSTIC OVARIAN SYNDROME): Primary | Chronic | ICD-10-CM

## 2023-05-05 RX ORDER — NORETHINDRONE AND ETHINYL ESTRADIOL 1 MG-35MCG
1 KIT ORAL DAILY
Qty: 28 TABLET | Refills: 3 | Status: SHIPPED | OUTPATIENT
Start: 2023-05-05

## 2023-05-05 NOTE — PROGRESS NOTES
Vidhi Moody is a 28 y.o. y/o female.     Chief Complaint: Follow-up oral contraceptives treatment of abnormal uterine bleeding    HPI:   28 y.o. .  Patient's last menstrual period was 2023 (exact date)..  Patient with history of polycystic ovaries and abnormal uterine bleeding was having breakthrough bleeding on Sprintec did better on Ortho-Novum 135 but blood pressure 126/88 history of preeclampsia.  I told her I did not think that oral contraceptives would be good long-term.  Her  is status post vasectomy.  She is certain she wants no further childbearing.  Discussed option of medicated IUD versus ablation versus hysterectomy after reviewing risk benefits alternatives and she had done a lot of reading about it before she is leaning toward hysterectomy          Review of Systems     Constitutional: Denies night sweats    HENT: No hearing changes, denies ear pain    Eye: No eye pain; no foreign body in eye    Pulmonary: No hemoptysis    Cardiovascular: No claudication    GI: No hematemesis    Musculoskeletal: No arthralgias, no joint swelling    Endocrine: No polydipsia or polyuria    Hematologic: Denies any free bleeding    Psychiatric: Denies any delusions    The following portions of the patient's history were reviewed and updated as appropriate: allergies, current medications, past family history, past medical history, past social history, past surgical history and problem list.    Allergies   Allergen Reactions   • Adhesive Tape Hives   • Dilaudid [Hydromorphone] Irritability        Outpatient Medications Prior to Visit   Medication Sig Dispense Refill   • norethindrone-ethinyl estradiol (Ortho-Novum , 28,) 1-35 MG-MCG per tablet Take 1 tablet by mouth Daily. 28 tablet 3   • triamcinolone (KENALOG) 0.1 % ointment Apply 1 application topically to the appropriate area as directed 2 (Two) Times a Day. 30 g 0   • labetalol (NORMODYNE) 300 MG tablet Take 1 tablet by mouth 2 (Two)  Times a Day. 60 tablet 1   • norgestimate-ethinyl estradiol (Sprintec 28) 0.25-35 MG-MCG per tablet Take 1 tablet by mouth Daily for 28 doses. 28 tablet 12     No facility-administered medications prior to visit.        The patient has a family history of   Family History   Problem Relation Age of Onset   • Hypertension Mother    • Hyperlipidemia Mother    • Thyroid nodules Mother    • No Known Problems Father    • No Known Problems Maternal Grandmother    • Hypertension Maternal Grandfather    • Hyperlipidemia Maternal Grandfather    • Heart disease Maternal Grandfather    • Breast cancer Paternal Grandmother 50   • No Known Problems Paternal Grandfather    • Colon cancer Paternal Uncle 50   • Diabetes Paternal Aunt         unsure which type or age of onset   • No Known Problems Daughter         Past Medical History:   Diagnosis Date   • Abnormal Pap smear of cervix    • Enlarged tonsils    • Miscarriage    • Ovarian cyst    • Preeclampsia    • Sore throat, chronic    • Varicella         OB History        4    Para   2    Term   1       1    AB   2    Living   3       SAB   0    IAB   0    Ectopic   0    Molar   0    Multiple   1    Live Births   3                 Social History     Socioeconomic History   • Marital status:    Tobacco Use   • Smoking status: Former   • Smokeless tobacco: Never   Substance and Sexual Activity   • Alcohol use: Not Currently     Comment: occassional   • Drug use: No   • Sexual activity: Yes     Partners: Male     Birth control/protection: Condom     Comment: last pap smear 2019 negative         Past Surgical History:   Procedure Laterality Date   • BREAST AUGMENTATION     •  SECTION Bilateral 2020    Procedure:  SECTION PRIMARY;  Surgeon: Diego Tate MD;  Location: [facility] LABOR DELIVERY;  Service: Obstetrics/Gynecology;  Laterality: Bilateral;   • DILATATION AND CURETTAGE     • SHOULDER SURGERY Right    • TONSILLECTOMY     •  "TONSILLECTOMY AND ADENOIDECTOMY N/A 2018    Procedure: TONSILLECTOMY AND NASOPHARYNGOSCOPY   (PLEASE CALL PAT,);  Surgeon: Severiano Marshall MD;  Location: [facility];  Service: ENT   • WISDOM TOOTH EXTRACTION          Patient Active Problem List   Diagnosis   • PCOS (polycystic ovarian syndrome)   • Menorrhagia with regular cycle   • BMI 30.0-30.9,adult   • Atypical squamous cells of undetermined significance (ASCUS) on Papanicolaou smear of cervix   • Oral contraceptive pill surveillance   • Twin gestation in first trimester   • Pregnancy confirmed by positive urine test   • Hx of preeclampsia, prior pregnancy, currently pregnant   • Pregnancy   • Cramping affecting pregnancy, antepartum   • Nausea and vomiting during pregnancy   •  screening for malformation using ultrasonics   • Encounter for other  screening follow-up   • Nausea and vomiting   • Pregnancy complicated by fetal cerebral ventriculomegaly   •  uterine contractions, antepartum   • Exposure to cytomegalovirus (CMV)   • Elevated blood pressure affecting pregnancy in second trimester, antepartum   • Poor fetal growth affecting management of mother in third trimester   • Severe preeclampsia   • History of  section   • Pre-existing essential hypertension during pregnancy in third trimester   • Postoperative state   • Postpartum state   • Viral respiratory illness        Documented Vitals    23 1058   BP: 126/88   Weight: 86.2 kg (190 lb)   Height: 170.2 cm (67\")        Body mass index is 29.76 kg/m².    Physical Exam  Constitutional: Appears to be in no acute distress; Eyes: Sclerae normal; Endocrine system: Thyroid palpation is normal; Pulmonary system: Lungs clear; Cardiovascular system: Heart regular rate and rhythm; Gastrointestinal system: Abdomen soft and nontender, active bowel sounds; Urologic system: CVA negative; Psychiatric: Appropriate insight; Neurologic: Gait within normal limits     Laboratory " Data:   No results for input(s): GLUCOSE, BUN, CREATININE, NA, K, CL, CO2, CALCIUM, PROTEINTOT, ALBUMIN, ALT, AST, ALKPHOS, BILITOT, EGFRIFNONA, GLOB, AGRATIO, BCR, ANIONGAP, BILIDIR, INDBILI in the last 15245 hours.  Lab Results - Last 18 Months   Lab Units 04/28/22  0938   WBC 10*3/mm3 10.04   RBC 10*6/mm3 5.14   HEMOGLOBIN g/dL 14.9   HEMATOCRIT % 41.9   MCV fL 81.5   MCH pg 29.0   MCHC g/dL 35.6   RDW % 12.8   RDW-SD fl 37.6   MPV fL 10.0   PLATELETS 10*3/mm3 245     Lab Results - Last 18 Months   Lab Units 04/28/22  0938   HCG QUALITATIVE  Negative       Assessment        Diagnosis Plan   1. PCOS (polycystic ovarian syndrome)        2. Abnormal uterine bleeding (AUB)      Abnormal uterine bleeding responded to oral contraceptives at 135 level but developed hypertension.   s/p vasectomy.  Discussed contraceptive options int      I will renew oral contraceptives until treatment plan can be made.  I told her I am not doing a major GYN surgery since semiretired we will get a consultation with Dr. Valdez or Dr. Calderon    Plan         New Medications Ordered This Visit   Medications   • norethindrone-ethinyl estradiol (Ortho-Novum 1/35, 28,) 1-35 MG-MCG per tablet     Sig: Take 1 tablet by mouth Daily.     Dispense:  28 tablet     Refill:  3             This document has been electronically signed by Diego Tate MD on May 5, 2023 12:24 CDT    Please note that portions of this note were completed with a voice recognition program.

## 2023-08-09 ENCOUNTER — OFFICE VISIT (OUTPATIENT)
Dept: OBSTETRICS AND GYNECOLOGY | Facility: CLINIC | Age: 28
End: 2023-08-09
Payer: COMMERCIAL

## 2023-08-09 VITALS
HEIGHT: 67 IN | DIASTOLIC BLOOD PRESSURE: 84 MMHG | WEIGHT: 199.4 LBS | BODY MASS INDEX: 31.3 KG/M2 | SYSTOLIC BLOOD PRESSURE: 120 MMHG

## 2023-08-09 DIAGNOSIS — E28.2 PCOS (POLYCYSTIC OVARIAN SYNDROME): ICD-10-CM

## 2023-08-09 DIAGNOSIS — N93.9 ABNORMAL UTERINE BLEEDING (AUB): Primary | ICD-10-CM

## 2023-08-09 NOTE — PROGRESS NOTES
[facility]  Gynecology  Date of Service: 2023    CC: surgery consult    HPI  Vidhi Moody is a 28 y.o.  premenopausal female who presents with complaints of AUB, failed medical mangement, desire for definitive management with hysterectomy.      23 seen by Dr. Tate. H/o PCOS and AUB (breakthrough bleeding on OCPs).  status vasectomy. Sure does not desire future pregnancy. Discussed with Dr. Tate option for LNG-IUD, ablation, IUD. Made appt for surgery consult.    22 pap NIL, TSH 0.830, free T4 1.12, total testosterone 25, prolactin 4.77, LH 4.19, FSH 3.41, Estradiol 111, Hgb 14.9, Plt 245    Period issues since 2019 after first child, then again after twins . Irregular, persistent bleeding. Even started BC at 11 yo for heavy persistent bleeding.    Tried 3-4 birth controls and made her feel crazy, worse periods, didn't work after child. Tried patch (coming off, breakthrough bleeding). Mom had issues with DMPA and didn't want to trial. [place] bad things and didn't want to trial IUD. On continuous OCP has been doing better. No period on continuous dose. Sometimes cramping, a lot of time mostly just bleeding issue. On heavy day q2h ultra tampon changing, sometimes bleeding through onto pad. Sometimes clots. No bleeding after, no pain with sex. No pain or bleeding with BM. Always has had some intermittent constipation/diarrhea.    Denies any vaginal itching, burning, irritation, or discharge.  Denies any sexual dysfunction concerns. Some ELSIE. No daily pads.     ROS  Review of Systems   Constitutional: Negative.    HENT: Negative.     Respiratory: Negative.     Cardiovascular: Negative.    Gastrointestinal: Negative.    Genitourinary:  Positive for menstrual problem and vaginal bleeding.   Skin: Negative.    Psychiatric/Behavioral: Negative.       GYN HISTORY  Menarche: 12  Menses: Always heavy. More regular and controlled intially on OCP, but around  more  issues off BC.   History of STIs: denies  Last pap smear:   Last Completed Pap Smear            PAP SMEAR (Every 3 Years) Next due on 2022  Liquid-based Pap Smear, Screening    2019  Liquid-based Pap Smear, Screening    2019  Liquid-based Pap Smear, Screening                  Abnormal pap smear history: h/o abnormal pap but followups normal  Contraception: vasectomy     OB HISTORY  OB History    Para Term  AB Living   4 2 1 1 2 3   SAB IAB Ectopic Molar Multiple Live Births   0 0 0 0 1 3      # Outcome Date GA Lbr Tano/2nd Weight Sex Delivery Anes PTL Lv   4A  20 34w4d  2240 g (4 lb 15 oz) F CS-LTranv Spinal Y CAYLA   4B  20 34w4d  2220 g (4 lb 14.3 oz) M CS-LTranv Spinal Y CAYLA   3 Term 19 37w1d / 00:29 2807 g (6 lb 3 oz) F Vag-Spont EPI N CAYLA   2 AB  5w0d    SAB      1 AB  9w0d    SAB         Birth Comments: D&C performed at [facility]     PAST MEDICAL HISTORY  Past Medical History:   Diagnosis Date    Abnormal Pap smear of cervix     Enlarged tonsils     Miscarriage     Ovarian cyst     Preeclampsia     Sore throat, chronic     Varicella    Some increased HAs    PAST SURGICAL HISTORY  Past Surgical History:   Procedure Laterality Date    BREAST AUGMENTATION       SECTION Bilateral 2020    Procedure:  SECTION PRIMARY;  Surgeon: Diego Tate MD;  Location: [facility] LABOR DELIVERY;  Service: Obstetrics/Gynecology;  Laterality: Bilateral;    DILATATION AND CURETTAGE      SHOULDER SURGERY Right     TONSILLECTOMY      TONSILLECTOMY AND ADENOIDECTOMY N/A 2018    Procedure: TONSILLECTOMY AND NASOPHARYNGOSCOPY   (PLEASE CALL PAT,);  Surgeon: Severiano Marshall MD;  Location: [facility] OR;  Service: ENT    WISDOM TOOTH EXTRACTION       FAMILY HISTORY  Family History   Problem Relation Age of Onset    Hypertension Mother     Hyperlipidemia Mother     Thyroid nodules Mother     No Known Problems Father     No  "Known Problems Maternal Grandmother     Hypertension Maternal Grandfather     Hyperlipidemia Maternal Grandfather     Heart disease Maternal Grandfather     Breast cancer Paternal Grandmother 50    No Known Problems Paternal Grandfather     Colon cancer Paternal Uncle 50    Diabetes Paternal Aunt         unsure which type or age of onset    No Known Problems Daughter    PGM breast cancer >49 yo    SOCIAL HISTORY  Social History     Socioeconomic History    Marital status:    Tobacco Use    Smoking status: Former    Smokeless tobacco: Never   Substance and Sexual Activity    Alcohol use: Not Currently     Comment: occassional    Drug use: No    Sexual activity: Yes     Partners: Male     Birth control/protection: Condom     Comment: last pap smear 11/6/2019 negative      ALLERGIES  Allergies   Allergen Reactions    Adhesive Tape Hives    Dilaudid [Hydromorphone] Irritability     HOME MEDICATIONS  Prior to Admission medications    Medication Sig Start Date End Date Taking? Authorizing Provider   Cyanocobalamin (Vitamin B-12) 5000 MCG sublingual tablet Place  under the tongue.   Yes Provider, MD Emilie   norethindrone-ethinyl estradiol (Ortho-Novum 1/35, 28,) 1-35 MG-MCG per tablet Take 1 tablet by mouth Daily. 5/5/23  Yes Diego Tate MD   triamcinolone (KENALOG) 0.1 % ointment Apply 1 application topically to the appropriate area as directed 2 (Two) Times a Day. 12/1/22   Aicha Augustine, APRN     PE  /84 (BP Location: Left arm, Patient Position: Sitting, Cuff Size: Adult)   Ht 170.2 cm (67\")   Wt 90.4 kg (199 lb 6.4 oz)   BMI 31.23 kg/mý        General: Alert, healthy, no distress, well nourished and well developed.  Neurologic: Alert, oriented to person, place, and time.  Gait normal.  Cranial nerves II-XII grossly intact.  HEENT: Normocephalic, atraumatic.  Extraocular muscles intact.  Lungs: Normal respiratory effort.   Abdomen: Soft, non-tender, non-distended,no masses, no " hepatosplenomegaly, no hernia.  Skin: No rash, no lesions.  Extremities: No cyanosis, clubbing or edema.  PELVIC EXAM:  Plan for followup for EMB after US    IMPRESSION  Vidhi Moody is a 28 y.o.  presenting with heavy irregular bleeding with h/o PCOS; desiring definitive management with failure of multiple hormonal methods of management.    PLAN    1. Abnormal uterine bleeding (AUB)  2. PCOS (polycystic ovarian syndrome)  - Discussed workup and evaluation for abnormal bleeding  - Although no significant changes in bleeding; long h/o HMB with multiple failed medical management; recommend evaluation with GYN US and EMB for endometrial evaluation to rule out hyperplasia/atypia/malignancy  - Pap UTD  - Discussed briefly options: would be good candidate for IUD but declines, does not want to try DMPA as mom had issues, previously multiple OCPs and patch without success; discussed ablation but worries about risk of failure/breakthough due to young age; patient strongly desires definitive management with hysterectomy  - Will followup again after US and at time of biopsy to finalize plans  - If still desires to proceed with hysterectomy, discussed plan therefore would be for total laparoscopic hysterectomy, bilateral salpingectomy, cystoscopy, any other indicated procedures; would plan to leave ovaries, removing one only if scarring/bleeding/abnormal appearance necessitated removal during surgery, other remaining ovary would make up for removed ovary with hormonal function; in rare circumstance had to remove both ovaries then would recommend considering HRT due to risks of symptomatic menopause, affects on CV and bone health  - FU after US for EMB and further discussion  - US Non-ob Transvaginal; Future        This document has been electronically signed by Margarita Calderon DO on 2023 22:13 CDT

## 2023-08-28 ENCOUNTER — TELEPHONE (OUTPATIENT)
Dept: OBSTETRICS AND GYNECOLOGY | Facility: CLINIC | Age: 28
End: 2023-08-28

## 2023-08-28 RX ORDER — NORETHINDRONE AND ETHINYL ESTRADIOL 1 MG-35MCG
KIT ORAL
Qty: 28 TABLET | Refills: 2 | OUTPATIENT
Start: 2023-08-28

## 2023-08-28 RX ORDER — NORETHINDRONE AND ETHINYL ESTRADIOL 1 MG-35MCG
1 KIT ORAL DAILY
Qty: 28 TABLET | Refills: 3 | Status: SHIPPED | OUTPATIENT
Start: 2023-08-28

## 2023-08-28 NOTE — TELEPHONE ENCOUNTER
PATIENT CALLED AND SPOKE TO DR HARO'S NURSE IN REGARDS TO HER BIRTH CONTROL PRESCRIPTION BEING DENIED. SHE CALLED HER PHARMACY AND SHE SAID THAT DR HARO AND DR LEUNG'S PRESCRIPTION BOTH DENIED. I EXPLAINED THAT HER INSURANCE IS NOT WANTING TO COVER IT AND THAT SHE WOULD NEED TO SPEAK TO THEM. SHE SAYS THAT NORMALLY SHE WOULD JUST PAY $25.00 EVEN IF INSURANCE DOESN'T COVER IT. HER NUMBER TO CALL BACK [phone].        THANKS,      LUTHER

## 2023-09-07 ENCOUNTER — LAB (OUTPATIENT)
Dept: LAB | Facility: HOSPITAL | Age: 28
End: 2023-09-07
Payer: COMMERCIAL

## 2023-09-07 ENCOUNTER — OFFICE VISIT (OUTPATIENT)
Dept: FAMILY MEDICINE CLINIC | Facility: CLINIC | Age: 28
End: 2023-09-07
Payer: COMMERCIAL

## 2023-09-07 VITALS
OXYGEN SATURATION: 99 % | DIASTOLIC BLOOD PRESSURE: 90 MMHG | SYSTOLIC BLOOD PRESSURE: 136 MMHG | HEART RATE: 99 BPM | TEMPERATURE: 99.6 F

## 2023-09-07 DIAGNOSIS — R50.9 FEVER, UNSPECIFIED FEVER CAUSE: ICD-10-CM

## 2023-09-07 DIAGNOSIS — R05.1 ACUTE COUGH: Primary | ICD-10-CM

## 2023-09-07 LAB
EXPIRATION DATE: NORMAL
FLUAV AG UPPER RESP QL IA.RAPID: NOT DETECTED
FLUBV AG UPPER RESP QL IA.RAPID: NOT DETECTED
INTERNAL CONTROL: NORMAL
Lab: NORMAL
SARS-COV-2 AG UPPER RESP QL IA.RAPID: NOT DETECTED

## 2023-09-07 PROCEDURE — 87428 SARSCOV & INF VIR A&B AG IA: CPT | Performed by: NURSE PRACTITIONER

## 2023-09-07 PROCEDURE — 99213 OFFICE O/P EST LOW 20 MIN: CPT | Performed by: NURSE PRACTITIONER

## 2023-09-07 PROCEDURE — 87635 SARS-COV-2 COVID-19 AMP PRB: CPT | Performed by: NURSE PRACTITIONER

## 2023-09-07 RX ORDER — BROMPHENIRAMINE MALEATE, PSEUDOEPHEDRINE HYDROCHLORIDE, AND DEXTROMETHORPHAN HYDROBROMIDE 2; 30; 10 MG/5ML; MG/5ML; MG/5ML
5 SYRUP ORAL 4 TIMES DAILY PRN
Qty: 240 ML | Refills: 0 | Status: SHIPPED | OUTPATIENT
Start: 2023-09-07

## 2023-09-07 NOTE — PROGRESS NOTES
Subjective   Vidhi Moody is a 28 y.o. female. Cough     History of Present Illness   Patient presents tohospitals for cough. She says she started feeling bad yesterday. Symptoms include sore throat, cough, body aches, head congestion, cough fever. Dayquil has not been helpful. Her  was recently exposed to COVID 19.     The following portions of the patient's history were reviewed and updated as appropriate: allergies, current medications, past family history, past medical history, past social history, past surgical history, and problem list.      Vitals:    09/07/23 1555   BP: 136/90   BP Location: Left arm   Patient Position: Sitting   Cuff Size: Large Adult   Pulse: 99   Temp: 99.6 °F (37.6 °C)   TempSrc: Infrared   SpO2: 99%       There is no height or weight on file to calculate BMI.    Objective   Physical Exam  Vitals and nursing note reviewed.   Constitutional:       General: She is not in acute distress.     Appearance: She is well-developed. She is not ill-appearing.   HENT:      Head: Normocephalic.      Right Ear: Hearing, tympanic membrane, ear canal and external ear normal.      Left Ear: Hearing, tympanic membrane, ear canal and external ear normal.      Nose: Nose normal. Mucosal edema present.      Mouth/Throat:      Lips: Pink.      Mouth: Mucous membranes are moist.      Pharynx: Oropharynx is clear. Uvula midline. Posterior oropharyngeal erythema present. No oropharyngeal exudate.   Eyes:      General: Lids are normal. Gaze aligned appropriately.      Conjunctiva/sclera: Conjunctivae normal.      Pupils: Pupils are equal, round, and reactive to light.   Neck:      Thyroid: No thyroid mass, thyromegaly or thyroid tenderness.   Cardiovascular:      Rate and Rhythm: Normal rate and regular rhythm.      Heart sounds: Normal heart sounds, S1 normal and S2 normal. No murmur heard.  Pulmonary:      Effort: Pulmonary effort is normal.      Breath sounds: Normal breath sounds and air entry. No  decreased breath sounds, wheezing, rhonchi or rales.   Abdominal:      General: Abdomen is flat. Bowel sounds are normal.      Palpations: Abdomen is soft.      Tenderness: There is no abdominal tenderness.   Musculoskeletal:         General: Normal range of motion.      Cervical back: Full passive range of motion without pain, normal range of motion and neck supple.   Lymphadenopathy:      Cervical: No cervical adenopathy.   Skin:     General: Skin is warm and dry.   Neurological:      General: No focal deficit present.      Mental Status: She is alert and oriented to person, place, and time.      Coordination: Coordination is intact.      Gait: Gait is intact.   Psychiatric:         Attention and Perception: Attention normal.         Mood and Affect: Mood normal.         Speech: Speech normal.         Behavior: Behavior normal. Behavior is cooperative.         Thought Content: Thought content normal.         Cognition and Memory: Cognition normal.         Judgment: Judgment normal.       Assessment & Plan   Diagnoses and all orders for this visit:    1. Acute cough (Primary)  -     POCT SARS-CoV-2 Antigen NURY + Flu  -     brompheniramine-pseudoephedrine-DM 30-2-10 MG/5ML syrup; Take 5 mL by mouth 4 (Four) Times a Day As Needed for Cough or Congestion.  Dispense: 240 mL; Refill: 0  -     COVID-19, [facility] IN-HOUSE, NP SWAB IN TRANSPORT MEDIA 8-10 HR TAT - Swab, Oropharynx    2. Fever, unspecified fever cause  -     COVID-19, [facility] IN-HOUSE, NP SWAB IN TRANSPORT MEDIA 8-10 HR TAT - Swab, Oropharynx    SARS/FLU POC swab was negative.  I suspect COVID 19.  SARS PCR test ordered to evaluate further.  Take bromphed 5 ml QID PRN for cough/congestion.  Take tylenol or ibuprofen as directed PRN for fever/pain.  Rest, stay well hydrated.               This document has been electronically signed by ADIA Sin on  September 8, 2023 16:49 CDT

## 2023-09-08 LAB — SARS-COV-2 N GENE RESP QL NAA+PROBE: NOT DETECTED

## 2023-09-11 ENCOUNTER — PROCEDURE VISIT (OUTPATIENT)
Dept: OBSTETRICS AND GYNECOLOGY | Facility: CLINIC | Age: 28
End: 2023-09-11
Payer: COMMERCIAL

## 2023-09-11 VITALS
WEIGHT: 196.4 LBS | BODY MASS INDEX: 30.83 KG/M2 | SYSTOLIC BLOOD PRESSURE: 136 MMHG | DIASTOLIC BLOOD PRESSURE: 82 MMHG | HEIGHT: 67 IN

## 2023-09-11 DIAGNOSIS — N93.9 ABNORMAL UTERINE BLEEDING (AUB): Primary | ICD-10-CM

## 2023-09-11 LAB
B-HCG UR QL: NEGATIVE
EXPIRATION DATE: NORMAL
INTERNAL NEGATIVE CONTROL: NORMAL
INTERNAL POSITIVE CONTROL: NORMAL
Lab: NORMAL

## 2023-09-11 PROCEDURE — 58100 BIOPSY OF UTERUS LINING: CPT | Performed by: STUDENT IN AN ORGANIZED HEALTH CARE EDUCATION/TRAINING PROGRAM

## 2023-09-11 PROCEDURE — 99214 OFFICE O/P EST MOD 30 MIN: CPT | Performed by: STUDENT IN AN ORGANIZED HEALTH CARE EDUCATION/TRAINING PROGRAM

## 2023-09-11 PROCEDURE — 81025 URINE PREGNANCY TEST: CPT | Performed by: STUDENT IN AN ORGANIZED HEALTH CARE EDUCATION/TRAINING PROGRAM

## 2023-09-11 RX ORDER — SODIUM CHLORIDE 9 MG/ML
40 INJECTION, SOLUTION INTRAVENOUS AS NEEDED
OUTPATIENT
Start: 2023-09-11

## 2023-09-11 RX ORDER — SODIUM CHLORIDE 0.9 % (FLUSH) 0.9 %
10 SYRINGE (ML) INJECTION AS NEEDED
OUTPATIENT
Start: 2023-09-11

## 2023-09-11 RX ORDER — SODIUM CHLORIDE, SODIUM LACTATE, POTASSIUM CHLORIDE, CALCIUM CHLORIDE 600; 310; 30; 20 MG/100ML; MG/100ML; MG/100ML; MG/100ML
125 INJECTION, SOLUTION INTRAVENOUS CONTINUOUS
OUTPATIENT
Start: 2023-09-11

## 2023-09-11 RX ORDER — SODIUM CHLORIDE 0.9 % (FLUSH) 0.9 %
3 SYRINGE (ML) INJECTION EVERY 12 HOURS SCHEDULED
OUTPATIENT
Start: 2023-09-11

## 2023-09-11 NOTE — PROGRESS NOTES
[facility]  Gynecology  Date of Service: 2023    CC: EMB, FU    HPI  Vidhi Moody is a 28 y.o.  premenopausal female who presents with complaints of AUB, failed medical mangement, desire for definitive management with hysterectomy.       23 seen by Dr. Tate. H/o PCOS and AUB (breakthrough bleeding on OCPs).  status vasectomy. Sure does not desire future pregnancy. Discussed with Dr. Tate option for LNG-IUD, ablation, IUD. Made appt for surgery consult.     22 pap NIL, TSH 0.830, free T4 1.12, total testosterone 25, prolactin 4.77, LH 4.19, FSH 3.41, Estradiol 111, Hgb 14.9, Plt 245    Prelim US: Uterus 7.3 x 3.8 x 4.9 cm, ES 0.65 cm, ROV 3.2 x 2.7 x 4.0 cm with 3.4 x 2.4 x 2.3 cm cyst; LOV 2.3 x 1.5 x 2.2 cm     Period issues since 2019 after first child, then again after twins . Irregular, persistent bleeding. Even started BC at 11 yo for heavy persistent bleeding.     Tried 3-4 birth controls and made her feel crazy, worse periods, didn't work after child. Tried patch (coming off, breakthrough bleeding). Mom had issues with DMPA and didn't want to trial. [place] bad things and didn't want to trial IUD. On continuous OCP has been doing better. No period on continuous dose. Sometimes cramping, a lot of time mostly just bleeding issue. On heavy day q2h ultra tampon changing, sometimes bleeding through onto pad. Sometimes clots. No bleeding after, no pain with sex. No pain or bleeding with BM. Always has had some intermittent constipation/diarrhea.    Denies any changes since last seen in clinic. No period in approx last 6 mos while on continuous birth control. Still sure desires hysterectomy.     Denies any vaginal itching, burning, irritation, or discharge.  Denies any sexual dysfunction concerns. Some ELSIE. No daily pads.     ROS  Review of Systems   Constitutional: Negative.    HENT: Negative.     Respiratory: Negative.     Cardiovascular: Negative.     Gastrointestinal:  Positive for constipation and diarrhea.   Genitourinary:  Positive for menstrual problem.   Skin: Negative.    Psychiatric/Behavioral: Negative.       GYN HISTORY  Menarche: 12  Menses: Always heavy. More regular and controlled intially on OCP, but around  more issues off BC.   History of STIs: denies  Last pap smear:   Last Completed Pap Smear            PAP SMEAR (Every 3 Years) Next due on 2022  Liquid-based Pap Smear, Screening    2019  Liquid-based Pap Smear, Screening    2019  Liquid-based Pap Smear, Screening                  Abnormal pap smear history: h/o abnormal pap but followups normal  Contraception: vasectomy     OB HISTORY  OB History    Para Term  AB Living   4 2 1 1 2 3   SAB IAB Ectopic Molar Multiple Live Births   0 0 0 0 1 3      # Outcome Date GA Lbr Tano/2nd Weight Sex Delivery Anes PTL Lv   4A  20 34w4d  2240 g (4 lb 15 oz) F CS-LTranv Spinal Y CAYLA   4B  20 34w4d  2220 g (4 lb 14.3 oz) M CS-LTranv Spinal Y CAYLA   3 Term 19 37w1d / 00:29 2807 g (6 lb 3 oz) F Vag-Spont EPI N CAYLA   2 AB  5w0d    SAB      1 AB  9w0d    SAB         Birth Comments: D&C performed [facility]     PAST MEDICAL HISTORY  Past Medical History:   Diagnosis Date    Abnormal Pap smear of cervix     Enlarged tonsils     Miscarriage     Ovarian cyst     Preeclampsia     Sore throat, chronic     Varicella      PAST SURGICAL HISTORY  Past Surgical History:   Procedure Laterality Date    BREAST AUGMENTATION       SECTION Bilateral 2020    Procedure:  SECTION PRIMARY;  Surgeon: Diego Tate MD;  Location: [facility] LABOR DELIVERY;  Service: Obstetrics/Gynecology;  Laterality: Bilateral;    DILATATION AND CURETTAGE      SHOULDER SURGERY Right     TONSILLECTOMY      TONSILLECTOMY AND ADENOIDECTOMY N/A 2018    Procedure: TONSILLECTOMY AND NASOPHARYNGOSCOPY   (PLEASE CALL AKUA,);   "Surgeon: Severiano Marshall MD;  Location: [facility];  Service: ENT    WISDOM TOOTH EXTRACTION       FAMILY HISTORY  Family History   Problem Relation Age of Onset    Hypertension Mother     Hyperlipidemia Mother     Thyroid nodules Mother     No Known Problems Father     No Known Problems Maternal Grandmother     Hypertension Maternal Grandfather     Hyperlipidemia Maternal Grandfather     Heart disease Maternal Grandfather     Breast cancer Paternal Grandmother 50    No Known Problems Paternal Grandfather     Colon cancer Paternal Uncle 50    Diabetes Paternal Aunt         unsure which type or age of onset    No Known Problems Daughter      SOCIAL HISTORY  Social History     Socioeconomic History    Marital status:    Tobacco Use    Smoking status: Former    Smokeless tobacco: Never   Substance and Sexual Activity    Alcohol use: Not Currently     Comment: occassional    Drug use: No    Sexual activity: Yes     Partners: Male     Birth control/protection: Condom     Comment: last pap smear 11/6/2019 negative      ALLERGIES  Allergies   Allergen Reactions    Adhesive Tape Hives    Dilaudid [Hydromorphone] Irritability     HOME MEDICATIONS  Prior to Admission medications    Medication Sig Start Date End Date Taking? Authorizing Provider   Cyanocobalamin (Vitamin B-12) 5000 MCG sublingual tablet Place  under the tongue.   Yes Provider, MD Emilie   norethindrone-ethinyl estradiol (Ortho-Novum 1/35, 28,) 1-35 MG-MCG per tablet Take 1 tablet by mouth Daily. 8/28/23  Yes Margarita Calderon DO   brompheniramine-pseudoephedrine-DM 30-2-10 MG/5ML syrup Take 5 mL by mouth 4 (Four) Times a Day As Needed for Cough or Congestion. 9/7/23   Aicha Augustine, APRN     PE  /82   Ht 170.2 cm (67\")   Wt 89.1 kg (196 lb 6.4 oz)   BMI 30.76 kg/m²        General: Alert, healthy, no distress, well nourished and well developed.  Neurologic: Alert, oriented to person, place, and time.  Gait normal.  Cranial nerves II-XII " grossly intact.  HEENT: Normocephalic, atraumatic.  Extraocular muscles intact  Lungs: Normal respiratory effort.    Abdomen: Soft, non-tender, non-distended,no masses, no hepatosplenomegaly, no hernia.  Skin: No rash, no lesions.  Extremities: No cyanosis, clubbing or edema.  PELVIC EXAM:  External Genitalia/Vulva: Anatomy is normal, no significant redness of labia, no discharge on vulvar tissues, [place]'s and [place]'s glands are normal, no ulcers, no condylomatous lesions.  Urethral meatus: Normal, no lesions, no prolapse.  Urethra: Normal, no masses, no tenderness with palpation.  Bladder: Normal, no fullness, no masses, no tenderness with palpation.  Vagina: Vaginal tissues are not inflamed, normal color and texture, no significant discharge present.  Pelvic support adequate.  Cervix: Normal, no lesions, no purulent discharge, no cervical motion tenderness.  Uterus: Normal size, shape, and consistency.  Good mobility noted.  Minimal descent noted with good support.  Adnexa: Normal size and shape bilaterally, no palpable mass bilaterally and non-tender bilaterally.  Rectal: NATHAN deferred.    Endometrial Biopsy    Date/Time: 9/11/23  Performed by: Margarita Calderon DO  Authorized by: Margarita Calderon DO     Consent:     Consent obtained: verbal and written    Consent given by: patient    Risks discussed: bleeding, infection, need for repeat procedure and pain    Alternatives discussed: alternative treatment and observation    Patient agrees, verbalizes understanding, and wants to proceed: yes    Indications:     Indications: h/o irregular bleeding, planning for hysterectomy  Procedure:     Prepped with: Betadine    Tenaculum used: yes      Cervix dilated: no    Findings:     Cervix: normal      Specimen collected: specimen collected and sent to pathology      Patient tolerance: tolerated well, no immediate complications  Comments:     Procedure comments: Endometrial biopsy was performed following verbal consent  including risks of infection, bleeding, perforation, pain, need for additional procedures. Speculum was placed and cervix was adequately visualized. Cetacaine sprayed on cervix. Betadine was used to cleanse cervix x 3. Tenaculum was placed on anterior cervix. Pipelle was used to sound uterus to 7 cm and 1 pass was/were performed with moderate amount of blood tissue collected. The tenaculum was removed. Tenaculum sites were hemostatic and scant blood noted from cervical os. Speculum was removed. Patient tolerated procedure well.      IMPRESSION  Vidhi Moody is a 28 y.o.  presenting with h/o heavy irregular bleeding with h/o PCOS; desiring definitive management with failure of multiple hormonal methods of management.     PLAN    1. Abnormal uterine bleeding (AUB)  2. PCOS (polycystic ovarian syndrome)  - Discussed workup and evaluation for abnormal bleeding  - Although no significant changes in bleeding; long h/o HMB with multiple failed medical management; recommend evaluation with GYN US and EMB for endometrial evaluation to rule out hyperplasia/atypia/malignancy  - Pap UTD, EMB today  - US WNL other than 3.4 cm simple cyst on ROV   - Discussed briefly options: would be good candidate for IUD but declines, does not want to try DMPA as mom had issues, previously multiple OCPs and patch without success; discussed ablation but worries about risk of failure/breakthough due to young age; patient strongly desires definitive management with hysterectomy    > Today patient stated amenorrhea x 6 mos with BCP but not desiring to stay on pills and sure desires hysterectomy  - Plan therefore would be for total laparoscopic hysterectomy, bilateral salpingectomy, cystoscopy, any other indicated procedures; would plan to leave ovaries, removing one only if scarring/bleeding/abnormal appearance necessitated removal during surgery, other remaining ovary would make up for removed ovary with hormonal function; in  rare circumstance had to remove both ovaries then would recommend considering HRT due to risks of symptomatic menopause, affects on CV and bone health  - Discussed risks/benefits of surgery: risk of infection, bleeding, damage to surrounding structures, perforation, need for additional procedures; discussed risk of anesthesia including heart attack, stroke, and death;  plan for outpatient surgery discussed.               This document has been electronically signed by Margarita Calderon DO on September 11, 2023 15:16 CDT

## 2023-09-13 LAB — REF LAB TEST METHOD: NORMAL

## (undated) DEVICE — SUT  GUT PLAIN 2/0 CT1 27IN 843H

## (undated) DEVICE — ELECTRD BLD EXT EDGE/INSUL 6IN

## (undated) DEVICE — SUT VIC PLS 0 CTX 36IN UD VCP978H

## (undated) DEVICE — SYS SKIN CLS DERMABOND PRINEO W/22CM MESH TP

## (undated) DEVICE — GARMENT,MEDLINE,DVT,INT,CALF,MED, GEN2: Brand: MEDLINE

## (undated) DEVICE — GLV SURG MICROTOUCH LTX SZ7 STRL

## (undated) DEVICE — UNDYED BRAIDED (POLYGLACTIN 910), SYNTHETIC ABSORBABLE SUTURE: Brand: COATED VICRYL

## (undated) DEVICE — SUT VIC 2/0 CT1 36IN

## (undated) DEVICE — GLV SURG SENSICARE PI LF PF 7.5

## (undated) DEVICE — STERILE POLYISOPRENE POWDER-FREE SURGICAL GLOVES WITH EMOLLIENT COATING: Brand: PROTEXIS

## (undated) DEVICE — GLV SURG SIGNATURE ESSENTIAL PF LTX SZ6.5

## (undated) DEVICE — MAD T & A: Brand: MEDLINE INDUSTRIES, INC.

## (undated) DEVICE — TRY IRR

## (undated) DEVICE — 3M™ STERI-STRIP™ REINFORCED ADHESIVE SKIN CLOSURES, R1547, 1/2 IN X 4 IN (12 MM X 100 MM), 6 STRIPS/ENVELOPE: Brand: 3M™ STERI-STRIP™

## (undated) DEVICE — SUT GUT CHRM 2/0 SH 27IN G123H

## (undated) DEVICE — COAGULATOR SXN HNDSWITCH 10F16IN

## (undated) DEVICE — PAD,ABDOMINAL,8"X10",ST,LF: Brand: MEDLINE

## (undated) DEVICE — RED RUBBER URETHRAL CATHETER: Brand: DOVER

## (undated) DEVICE — SUT GUT CHRM 1 CTX 36IN 905H

## (undated) DEVICE — SUT MNCRYL 3/0 Y936H

## (undated) DEVICE — SUT VIC 0 CTX 36IN J978H

## (undated) DEVICE — PK C/SECT 60

## (undated) DEVICE — GLV SURG SIGNATURE ESSENTIAL PF LTX SZ7

## (undated) DEVICE — ADHS LIQ MASTISOL 2/3ML

## (undated) DEVICE — TP SXN YANKR BLB TIP W/TBG 10F LF STRL

## (undated) DEVICE — DEFOGGER!" ANTI FOG KIT: Brand: DEROYAL

## (undated) DEVICE — DRSNG TELFA PAD NONADH STR 1S 3X8IN

## (undated) DEVICE — TRY SPINE PENCAN 24GA X4IN

## (undated) DEVICE — SUT MONOCRYL 4/0 PS2 27IN Y426H ETY426H

## (undated) DEVICE — GLV SURG SENSICARE GREEN W/ALOE PF LF 6 STRL

## (undated) DEVICE — SOL IRR NACL 0.9PCT BT 1000ML

## (undated) DEVICE — GLV SURG TRIUMPH LT PF LTX 7.5 STRL